# Patient Record
Sex: FEMALE | Race: WHITE
[De-identification: names, ages, dates, MRNs, and addresses within clinical notes are randomized per-mention and may not be internally consistent; named-entity substitution may affect disease eponyms.]

---

## 2019-03-29 NOTE — HP
Admitting History and Physical





- Primary Care Physician


PCP: Katie Rose





- Admission


Chief Complaint: Right breast atypia


History of Present Illness: 





63 year old postmenapausal female  with family H/O breast cancer and was found 

to have some right breast assymetry at 12:00 on screening mammogram 2019 

which persisted on x veiws and Us showed 5 mm are corresponding to mammogram 

finding. US core bx left breast 3/13/2019 showed Atypical duct hyperplasia at 12

:00. 


History Source: Patient


Limitations to Obtaining History: No Limitations





- Past Medical History


Cardiovascular: Yes: HTN





- Past Surgical History


Past Surgical History: Yes:  (x3)


Additional Past Surgical History: 





Mohs surgery basal cell 





- Smoking History


Smoking history: Never smoked


Have you smoked in the past 12 months: No





- Alcohol/Substance Use


Hx Alcohol Use: No





Home Medications





- Allergies


Allergies/Adverse Reactions: 


 Allergies











Allergy/AdvReac Type Severity Reaction Status Date / Time


 


No Known Allergies Allergy   Verified 19 15:24














- Home Medications


Home Medications: 


Ambulatory Orders





Metoprolol Succinate [Toprol Xl] 12.5 mg PO DAILY 19 











Family Disease History





- Family Disease History


Family Disease History: CA: Grandparent (pat GM breast  ca 60'd), Mother (CRC 72

)


Other Family History: pat cousin breast ca 45/50.  mat cousin breast and 

ovarian ca 55/63





Physical Examination


Breast(s): Yes: Other ( mod D sized cup breast Resolving bruising from bx right 

breast 12:00 no palpable masses in either breast no adenopathy)





Problem List





- Problems


(1) Atypical ductal hyperplasia of right breast


Code(s): N60.91 - UNSPECIFIED BENIGN MAMMARY DYSPLASIA OF RIGHT BREAST   





Assessment/Plan





Right breast wide excision with mammogram needle localization

## 2019-04-02 ENCOUNTER — HOSPITAL ENCOUNTER (OUTPATIENT)
Dept: HOSPITAL 74 - FASU | Age: 64
Discharge: HOME | End: 2019-04-02
Attending: SURGERY
Payer: COMMERCIAL

## 2019-04-02 VITALS — DIASTOLIC BLOOD PRESSURE: 78 MMHG | HEART RATE: 71 BPM | SYSTOLIC BLOOD PRESSURE: 146 MMHG

## 2019-04-02 VITALS — TEMPERATURE: 97.8 F

## 2019-04-02 VITALS — BODY MASS INDEX: 33.6 KG/M2

## 2019-04-02 DIAGNOSIS — N60.91: Primary | ICD-10-CM

## 2019-04-02 DIAGNOSIS — N64.89: ICD-10-CM

## 2019-04-02 DIAGNOSIS — Z80.3: ICD-10-CM

## 2019-04-02 PROCEDURE — 0HBT0ZZ EXCISION OF RIGHT BREAST, OPEN APPROACH: ICD-10-PCS | Performed by: SURGERY

## 2019-04-03 NOTE — OP
DATE OF OPERATION:  04/02/2019

 

PREOPERATIVE DIAGNOSIS:  Right breast atypical duct hyperplasia.

 

POSTOPERATIVE DIAGNOSIS:  Right breast atypical duct hyperplasia.

 

PROCEDURE:  Right breast partial mastectomy with mammographic needle localization.

 

ANESTHESIA:  Local with IV sedation.

 

SURGEON:  Marvin Pereira MD

 

COMPLICATIONS:  None.

 

Briefly, the patient is a 63-year-old, G3, P3, postmenopausal white female with a

strong family history with her paternal grandmother passed away from breast cancer in

her 60s, as well as 2 paternal cousins who had breast cancer at age 45 and age 50. 

Her maternal cousin had ovarian cancer at age 55 and breast cancer at age 63.  Her

mother passed away from colon cancer at age 72.  The patient underwent a mammography

in February 2019, showing some asymmetry in the right breast 12 o'clock region, and

ultrasound showed a corresponding 5-mm region which was biopsied under ultrasound

guidance, came back showing atypical duct hyperplasia.  Slides were reviewed and

confirmed the atypia, and wide excision was recommended.  She was brought in for the

procedure on April 2, 2019.

 

She first underwent a needle localization in radiology, was brought to the holding

area.  In the holding area, site verification was made, and informed consent was

obtained.  She was brought into the operating room, laid on the OR table in a supine

position.  Venodynes were placed on the lower extremities, and she received IV

sedation.  Next, 1 g of Ancef was given prior to the incision.  The right breast was

sterilely prepped and draped in usual fashion with the wire prepped in the field. 

Then, 1% lidocaine was given in a curvilinear fashion around the needle localization

site at the 12 o'clock region in the right breast.  Curvilinear incision was made,

and dissection was undertaken around the needle localization site.  The breast tissue

was completely removed from around the wire, and the specimen was removed with the

wire in the middle of the specimen.  The specimen was oriented with a long

lateral/short superior suture, and specimen radiographs showed removal of the clip in

question.  Hemostasis was achieved.  The breast parenchyma was then reapproximated

using 2-0 plain suture.  The skin was closed using interrupted 3-0 deep dermal Vicryl

suture and a running 4-0 subcuticular Biosyn suture.  Mastisol and Steri-Strips were

applied over the wound.  A compressive dressing placed over this.  The patient

tolerated the procedure well, without difficulty, was awake and alert at the end of

the procedure and brought back to Ambulatory Surgery postoperatively where she will

be discharged home the same day once discharge criteria are met.  She is to follow up

in the office in 1 week for a formal wound and pathology check.  Estimated blood loss

was minimal, and all sponge and needle counts were correct at the end of the case.

 

 

MARVIN PEREIRA M.D.

 

MARIA [id number]

DD: 04/02/2019 16:36

DT: 04/02/2019 19:41

Job #:  [id number]

## 2019-04-04 NOTE — PATH
Surgical Pathology Report



Patient Name:  CELESTE PINA

Accession #:  

Med. Rec. #:  [id number]                                                        

   /Age/Gender:  1955 (Age: 63) / F

Account:  [id number]                                                          

             [facility] 

Taken:  2019

Received:  2019

Reported:  2019

Physicians:  Carlos Pereira M.D.

  



Specimen(s) Received

 RIGHT BREAST WIDE EXCISION 





Clinical History

Right breast ADH on core biopsy







Final Diagnosis

BREAST, RIGHT, WIDE EXCISION:

BENIGN BREAST PARENCHYMA WITH FIBROCYSTIC AND COLUMNAR CELL CHANGES WITH

ASSOCIATED MICROCALCIFICATIONS IN A BACKGROUND OF FIBROELASTOTIC STROMA

SUGGESTIVE OF RADIAL SCAR.



Comment: Immunohistochemical stains performed and interpreted at [facility] for p63 and [facility] highlights myoepithelial cells. Prior

material is noted.





***Electronically Signed***

Deonna Waterman M.D.





Gross Description

Received in formalin, labeled "right breast wide excision," is a 4.3 x 3.7 x 2.8

cm. tan-yellow, irregular, portion of fibroadipose tissue with a needle

localization wire present. There is a short suture marking the superior aspect

and a long suture marking the lateral aspect, per the surgeon. There is no skin

or nipple present. The specimen is inked as follows: superior and lateral blue;

inferior green; medial yellow; anterior red; deep black. The specimen is

serially sectioned from lateral to medial. Sectioning reveals a 0.8 x 0.5 x 0.5

cm tan, indurated lesion. The lesion is 0.5 cm from the deep margin and 0.5 cm

from the inferior margin. Representative sections are submitted in 6 cassettes

as follows: 1-full face section of lesion with inferior and deep margins;

2-additional full face section of lesion with inferior and deep margins;

3-superior margin; 4-anterior margin; 5-medial margin; 6-lateral margin.



Time to formalin fixation: 5 minutes

Total formalin fixation time: Approximately 26 hours.

/4/3/2019



Located within [facility]/4/3/2019

## 2021-11-11 ENCOUNTER — NON-APPOINTMENT (OUTPATIENT)
Age: 66
End: 2021-11-11

## 2021-11-11 PROBLEM — Z00.00 ENCOUNTER FOR PREVENTIVE HEALTH EXAMINATION: Status: ACTIVE | Noted: 2021-11-11

## 2021-11-18 DIAGNOSIS — Z80.3 FAMILY HISTORY OF MALIGNANT NEOPLASM OF BREAST: ICD-10-CM

## 2021-11-18 DIAGNOSIS — Z80.0 FAMILY HISTORY OF MALIGNANT NEOPLASM OF DIGESTIVE ORGANS: ICD-10-CM

## 2021-11-18 DIAGNOSIS — Z87.891 PERSONAL HISTORY OF NICOTINE DEPENDENCE: ICD-10-CM

## 2021-11-18 DIAGNOSIS — Z80.41 FAMILY HISTORY OF MALIGNANT NEOPLASM OF OVARY: ICD-10-CM

## 2021-11-18 DIAGNOSIS — Z86.79 PERSONAL HISTORY OF OTHER DISEASES OF THE CIRCULATORY SYSTEM: ICD-10-CM

## 2021-11-18 DIAGNOSIS — Z85.828 PERSONAL HISTORY OF OTHER MALIGNANT NEOPLASM OF SKIN: ICD-10-CM

## 2021-11-19 ENCOUNTER — APPOINTMENT (OUTPATIENT)
Dept: BREAST CENTER | Facility: CLINIC | Age: 66
End: 2021-11-19
Payer: MEDICARE

## 2021-11-19 VITALS
HEIGHT: 67 IN | DIASTOLIC BLOOD PRESSURE: 79 MMHG | BODY MASS INDEX: 29.82 KG/M2 | OXYGEN SATURATION: 99 % | WEIGHT: 190 LBS | HEART RATE: 73 BPM | SYSTOLIC BLOOD PRESSURE: 139 MMHG

## 2021-11-19 DIAGNOSIS — Z80.8 FAMILY HISTORY OF MALIGNANT NEOPLASM OF OTHER ORGANS OR SYSTEMS: ICD-10-CM

## 2021-11-19 PROCEDURE — 99205 OFFICE O/P NEW HI 60 MIN: CPT

## 2021-11-19 RX ORDER — METOPROLOL SUCCINATE 200 MG/1
TABLET, EXTENDED RELEASE ORAL
Refills: 0 | Status: ACTIVE | COMMUNITY

## 2021-11-19 NOTE — REASON FOR VISIT
[Follow-Up: _____] : a [unfilled] follow-up visit [FreeTextEntry1] : The patient comes in with a strong family history of breast cancer and ovarian cancer and a personal history of undergoing a right breast 12:00 ultrasound-guided core biopsy showing atypical duct hyperplasia and then a wide excision in March 2019 just showing a radial sclerosing lesion with no further atypia.  She has a Caren model risk of 15.7%.  She was then found to have an abnormal mammography and ultrasound  in October 2021 with a suspicious approximately 2 cm left breast lower inner quadrant density which was biopsied on November 5, 2021 showing intermediate grade invasive duct cancer which is ER/WV strongly positive and HER-2/merrill negative with a Ki-67 between 40 and 50%.  She comes in now for an interval surgical evaluation with this newly diagnosed left breast cancer.

## 2021-11-19 NOTE — ASSESSMENT
[FreeTextEntry1] : The patient is a 66-year-old G3, P3 postmenopausal white female.  She underwent menarche at age 11 and had her first child at age 24.  She underwent menopause at age 54 and never took any hormone replacement therapy.  She has a family history with her mother who had colon cancer and passed away at age 72.  Her paternal grandmother passed away from breast cancer in her 60s.  A maternal cousin had ovarian cancer at age 55 and then breast cancer at age 63 and tested BRCA negative.  2 paternal cousins had breast cancer at ages 45 and age 50.  Both tested BRCA negative.  Her sister had a phyllodes tumor excised.  I take care of both her sisters, Racheal Stevenson and Mrs. Dunn.  She was found to have some architectural distortion in the 12:00 region of the right breast on mammography and ultrasound showed a 5 mm corresponding density.  Ultrasound-guided right breast 12:00 core biopsy at [facility] showed some atypical duct hyperplasia and she underwent a wide excision on March 27, 2019 showing a radial sclerosing lesion but no further atypia.  She has a Caren model lifetime risk of 15.7% and 5-year risk of 4.1%.  She underwent routine mammography and ultrasound at [facility] on October 26, 2021 showing postsurgical changes in the right breast  but there was a new ill-defined spiculated mass in the left breast lower inner 7:00 region measuring about 2 cm on mammography and 1.4 x 1.7 x 1.2 cm on ultrasound in the 7:00 region 6 cm from the nipple.  There was also an enlarged left lower axillary lymph node measuring 4.4 x 1.3 cm.  She underwent a left breast ultrasound-guided core biopsy on November 5, 2021 with the placement of a "cylindrical" shaped clip and pathology showing an intermediate grade 1.2 cm invasive duct cancer which is ER/MA strongly positive at 95 to 100% and HER-2/merrill negative by IHC with a Ki-67 between 40 and 50%.  On exam today, I cannot feel any suspicious densities in either breast even though close attention to the left breast 7:00 region. I reviewed her outside ultrasound from [facility] as well as her pathology and this is an intermediate grade invasive duct cancer which appears localized on ultrasound.  I have asked the patient to get me the mammography so I can review that as well.  There is an enlarged lymph node underneath the left axilla but this really appears benign and just enlarged.  I spoke to the patient about the need for an MRI to determine if there is any multifocal or contralateral disease.  If this is localized she be a good candidate for partial mastectomy and sentinel lymph node biopsy.  She understands the need for radiation with any breast conserving surgery.  I spoke to her about possible entrance into the TARGIT B trial which would randomize her into either external beam or intraoperative and external beam radiation.  She is also a candidate for genetic testing and I will refer her to our genetic counselor.  She understands that chemotherapy will be dependent on the final pathology and possibly Oncotype or MammaPrint score.  She understands the equivalent survival rates with lumpectomy and radiation versus mastectomy.  I spoke to her about the benefits of endocrine therapy given her hormone positive status as well.  Risk/benefits of proposed surgeries were also explained to the patient.  I will go ahead and follow-up on the MRI results and then we can go from there.

## 2021-11-19 NOTE — HISTORY OF PRESENT ILLNESS
[FreeTextEntry1] : The patient is a 66-year-old G3, P3 postmenopausal white female.  She underwent menarche at age 11 and had her first child at age 24.  She underwent menopause at age 54 and never took any hormone replacement therapy.  She has a family history with her mother who had colon cancer and passed away at age 72.  Her paternal grandmother passed away from breast cancer in her 60s.  A maternal cousin had ovarian cancer at age 55 and then breast cancer at age 63 and tested BRCA negative.  2 paternal cousins had breast cancer at ages 45 and age 50.  Both tested BRCA negative.  Her sister had a phyllodes tumor excised.  I take care of both her sisters, Racheal Stevenson and Mrs. Dunn.  She comes in now and was found to have some architectural distortion in the 12:00 region of the right breast on mammography and ultrasound showed a 5 mm corresponding density.  Ultrasound-guided right breast 12:00 core biopsy at [facility] showed some atypical duct hyperplasia and she underwent a wide excision on March 27, 2019 showing a radial sclerosing lesion but no further atypia.  She has a Caren model lifetime risk of 15.7% and 5-year risk of 4.1%.  She comes in for routine yearly follow-up and gets yearly mammography and ultrasound. She underwent routine mammography and ultrasound at [facility] on October 26, 2021 showing postsurgical changes in the right breast  but there was a new ill-defined spiculated mass in the left breast lower inner 7:00 region measuring about 2 cm on mammography and 1.4 x 1.7 x 1.2 cm on ultrasound in the 7:00 region 6 cm from the nipple.  There was also an enlarged left lower axillary lymph node measuring 4.4 x 1.3 cm.  She underwent a left breast ultrasound-guided core biopsy on November 5, 2021 with the placement of a "cylindrical" shaped clip and pathology showing an intermediate grade 1.2 cm invasive duct cancer which is ER/SD strongly positive at 95 to 100% and HER-2/merrill negative by IHC with a Ki-67 between 40 and 50%.  She comes in now for an interval surgical consultation

## 2021-11-19 NOTE — PHYSICAL EXAM
[Normocephalic] : normocephalic [Atraumatic] : atraumatic [EOMI] : extra ocular movement intact [Supple] : supple [No Supraclavicular Adenopathy] : no supraclavicular adenopathy [No Cervical Adenopathy] : no cervical adenopathy [Examined in the supine and seated position] : examined in the supine and seated position [No dominant masses] : no dominant masses in right breast  [No Nipple Retraction] : no left nipple retraction [No Nipple Discharge] : no left nipple discharge [Breast Mass Right Breast ___cm] : no masses [Breast Mass Left Breast ___cm] : no masses [No Axillary Lymphadenopathy] : no left axillary lymphadenopathy [No Edema] : no edema [No Rashes] : no rashes [No Ulceration] : no ulceration [Breast Nipple Inversion] : nipples not inverted [Breast Nipple Retraction] : nipples not retracted [Breast Nipple Flattening] : nipples not flattened [Breast Nipple Fissures] : nipples not fissured [Breast Abnormal Lactation (Galactorrhea)] : no galactorrhea [Breast Abnormal Secretion Bloody Fluid] : no bloody discharge [Breast Abnormal Secretion Serous Fluid] : no serous discharge [Breast Abnormal Secretion Opalescent Fluid] : no milky discharge [de-identified] : On exam, the patient has moderately ptotic C-cup breasts.  On palpation, I cannot feel any suspicious densities in either breast even with close attention to the left breast 7:00 region.  She has no axillary, supraclavicular, or cervical adenopathy.

## 2021-11-30 ENCOUNTER — APPOINTMENT (OUTPATIENT)
Dept: RADIATION ONCOLOGY | Facility: CLINIC | Age: 66
End: 2021-11-30
Payer: MEDICARE

## 2021-11-30 VITALS
WEIGHT: 210 LBS | RESPIRATION RATE: 12 BRPM | OXYGEN SATURATION: 99 % | DIASTOLIC BLOOD PRESSURE: 88 MMHG | TEMPERATURE: 98 F | HEIGHT: 67 IN | SYSTOLIC BLOOD PRESSURE: 152 MMHG | BODY MASS INDEX: 32.96 KG/M2 | HEART RATE: 78 BPM

## 2021-11-30 DIAGNOSIS — Z78.9 OTHER SPECIFIED HEALTH STATUS: ICD-10-CM

## 2021-11-30 PROCEDURE — 99204 OFFICE O/P NEW MOD 45 MIN: CPT | Mod: 25

## 2021-11-30 NOTE — PHYSICAL EXAM
[Supraclavicular Lymph Nodes Enlarged Bilaterally] : supraclavicular [Axillary Lymph Nodes Enlarged Bilaterally] : axillary [Normal] : no focal deficits [de-identified] : Well-healed scar in the upper outer quadrant of the right breast; breasts are symmetric; no palpable masses [de-identified] : I was not able to palpate the reported left axillary node

## 2021-11-30 NOTE — HISTORY OF PRESENT ILLNESS
[FreeTextEntry1] : Ms. Gayle is a 66 year old female referred here by Dr. Teixeira. \par \par On 3/13/19 she had a mammogram at [facility] and was found to have some architectural distortion in the 12:00 region of the right breast on mammography and ultrasound showed a 5 mm corresponding density.\par  \par On 3/13/19 she had an ultrasound guided right breast 12:00 core biopsy at [facility] which revealed some atypical duct hyperplasia. \par \par On 3/27/19 she underwent a wide excision lumpectomy with Dr. Teixeira at [facility] which revealed a radial sclerosing lesion but no further atypia. \par \par She continued her routine yearly follow-ups and yearly mammography and ultrasound. \par \par On 10/26/21 she underwent routine mammography and ultrasound at [facility], showing postsurgical changes in the right breast but there was a new ill-defined spiculated mass in the left breast lower inner 7:00 region measuring about 2 cm on mammography and 1.4 x 1.7 x 1.2 cm on ultrasound in the 7:00 region 6 cm from the nipple. There was also an enlarged left lower axillary lymph node measuring 4.4 x 1.3 cm. \par \par On 11/5/21 She underwent a left breast ultrasound-guided core biopsy at the 7:00-7:30, 6cm from the nipple, with the placement of a "cylindrical" shaped clip and pathology revealed an intermediate grade 1.2 cm invasive duct cancer which is ER/VT strongly positive at 95 to 100% and HER-2/merrill negative by IHC with a Ki-67 between 40 and 50%. \par \par On 11/29/21 she had a bilateral breast MRI at [facility] revealing biopsy proven malignancy in the lower slightly inner left breast measuring up to 2.1cm, with associated suspicious linear non mass enhancement extending anterior and inferior to the index mass measuring approximately 1.5cm. Linear nonmass enhancement in the central left breast. Suspicious nonmass enhancement in the lower inner and central inner right breast measuring up to 4.7 cm in AP dimension. \par \par She denies any pain. She reported having insomnia intermittently, along with anxiety related to her plan of care. Health and wellness information was given to the patient. She has a biopsy scheduled to 12/9/21 at [place] of a new finding in the right breast  and the left breast lymph node.

## 2021-11-30 NOTE — VITALS
[Maximal Pain Intensity: 0/10] : 0/10 [Least Pain Intensity: 0/10] : 0/10 [90: Able to carry normal activity; minor signs or symptoms of disease.] : 90: Able to carry normal activity; minor signs or symptoms of disease.  [90: Minor restrictions in physically strenous activity.] : 90: Minor restrictions in physically strenuous activity. [ECOG Performance Status: 1 - Restricted in physically strenuous activity but ambulatory and able to carry out work of a light or sedentary nature] : Performance Status: 1 - Restricted in physically strenuous activity but ambulatory and able to carry out work of a light or sedentary nature, e.g., light house work, office work [Date: ____________] : Patient's last distress assessment performed on [unfilled]. [7 - Distress Level] : Distress Level: 7 [FreeTextEntry7] : Health and wellness referral made

## 2021-11-30 NOTE — REVIEW OF SYSTEMS
[Insomnia] : insomnia [Anxiety] : anxiety [Negative] : Allergic/Immunologic [de-identified] : The patient is quite anxious due to this new diagnosis. She is having trouble sleeping and concentrating.

## 2021-12-08 ENCOUNTER — NON-APPOINTMENT (OUTPATIENT)
Age: 66
End: 2021-12-08

## 2021-12-09 ENCOUNTER — RESULT REVIEW (OUTPATIENT)
Age: 66
End: 2021-12-09

## 2021-12-14 ENCOUNTER — NON-APPOINTMENT (OUTPATIENT)
Age: 66
End: 2021-12-14

## 2021-12-15 ENCOUNTER — NON-APPOINTMENT (OUTPATIENT)
Age: 66
End: 2021-12-15

## 2021-12-20 ENCOUNTER — APPOINTMENT (OUTPATIENT)
Dept: HEMATOLOGY ONCOLOGY | Facility: CLINIC | Age: 66
End: 2021-12-20

## 2021-12-21 NOTE — DISCUSSION/SUMMARY
[FreeTextEntry1] : REASON FOR CONSULT\par Amanda Gayle is a 66-year-old female referred by Dr. Weston Teixeira for cancer genetic counseling and risk assessment due to a new diagnosis of bilateral breast cancer. Ms. Gayle was seen on 2021 at which time medical and family history was ascertained and a pedigree constructed. \par \par RELEVANT MEDICAL HISTORY\par Ms. Gayle was diagnosed with bilateral breast cancer in  at the age of 66. Pathology report of the right breast revealed invasive lobular carcinoma (ER+/ND+/HER2-) with LCIS and pathology of the left breast revealed invasive ductal cancer (ER+/ND+/HER2-). Surgery is not yet scheduled as results from genetic testing will help determine surgical approach. \par \par OTHER MEDICAL AND SURGICAL HISTORY:\par •	Medical History: no major medical history reported.\par •	Surgical History: , previous right lumpectomy for radial sclerosing lesion, knee surgery\par \par OB/GYN HISTORY:\par Obstetrical History: \par Age at Menarche: 14\par Menopausal Status: Post-menopausal with LMP at age 54 \par Age at First Live Birth: 24\par Oral Contraceptive Use: Yes, 1 year\par Hormone Replacement Therapy: No\par \par CANCER SCREENING HISTORY:  \par Breast: \par •	Mammography: 10/26/21- rec left breast biopsy\par •	Sonography: 10/26/21- rec left breast biopsy\par •	MRI: 21- rec right and additional left biopsy\par GYN:\par •	Pelvic Examination: Does not have regular GYN\par Colon:\par •	Colonoscopy: 3 years ago- no polyps reported, return in 7 years \par Skin:  \par •	FBSE: Yes\par •	Lesions biopsied/removed: Yes- face (basal cell)\par \par SOCIAL HISTORY:\par •	Tobacco-product use: Yes, former- quit 30+ years ago\par •	Environmental exposures: No\par \par FAMILY HISTORY:\par Maternal ancestry was reported as Danish and paternal ancestry was reported as Syriac/[place]. Ashkenazi Mu-ism ancestry was denied. A detailed family history of cancer was ascertained, see below and scanned chart for pedigree. \par \par 	Of note, Ms. Gayle reported some of her maternal and paternal female cousins with a history of cancer may have had genetic testing however further details are unknown.\par \par According to Ms. Gayle no one else in the family has had germline testing for cancer susceptibility. Consanguinity was denied. \par 	\par RISK ASSESSMENT:\par Ms. Gayle’s personal and family history is suggestive of a hereditary cancer syndrome given her new diagnosis of bilateral breast cancer, her daughter’s history of melanoma, a maternal cousin with breast and ovarian cancer in her 60s, a maternal cousin with breast cancer in her 50s, and two paternal cousins with breast cancer in their 40s or 50s. The patient meets National Comprehensive Cancer Network (NCCN) criteria for genetic testing. Given that she plans to make surgical decisions based on results from genetic testing, we recommended the [facility] Breast STAT Panel testing for genes associated with breast cancer (typically results within 5-12 days). This test analyzes 9 genes: GUSTAVO, BRCA1, BRCA2, CDH1, CHEK2, PALB2, PTEN, STK11, and TP53.\par \par The risks, benefits and limitations of genetic testing were discussed with Ms. Gayle. In addition, we discussed the purpose of genetic testing and possible test results (positive, negative, inconclusive) along with associated medical management options and psychosocial implications. Insurance coverage and potential out of pocket costs were also discussed. \par \par It was explained that risk assessment is based upon medical and family history as provided and may change in the future should new information be obtained. \par \par Following our discussion, Ms. Gayle consented to the above-mentioned genetic testing panel. Blood was drawn in our laboratory and sent to [facility] today.\par \par PLAN:\par \par 1.	Blood drawn today will be sent to [facility] for analysis. \par 2.	We will contact Ms. Gayle to schedule a follow-up appointment once the results are available. Results from the STAT panel generally return in 5-12 days. \par \par For any additional questions please call Cancer Genetics at [phone]. \par \par \par Blanca Hernandes MS, [facility]\par Genetic Counselor, Cancer Genetics\par \par \par CC: \par Weston Teixeira MD\par \par \par \par

## 2021-12-22 ENCOUNTER — APPOINTMENT (OUTPATIENT)
Dept: HEMATOLOGY ONCOLOGY | Facility: CLINIC | Age: 66
End: 2021-12-22
Payer: MEDICARE

## 2021-12-22 ENCOUNTER — APPOINTMENT (OUTPATIENT)
Dept: BREAST CENTER | Facility: CLINIC | Age: 66
End: 2021-12-22
Payer: MEDICARE

## 2021-12-22 VITALS
HEART RATE: 65 BPM | RESPIRATION RATE: 18 BRPM | WEIGHT: 212 LBS | SYSTOLIC BLOOD PRESSURE: 165 MMHG | BODY MASS INDEX: 33.27 KG/M2 | DIASTOLIC BLOOD PRESSURE: 90 MMHG | HEIGHT: 67 IN | OXYGEN SATURATION: 100 % | TEMPERATURE: 97.8 F

## 2021-12-22 PROCEDURE — 99205 OFFICE O/P NEW HI 60 MIN: CPT

## 2021-12-22 PROCEDURE — 99212 OFFICE O/P EST SF 10 MIN: CPT

## 2021-12-22 NOTE — REASON FOR VISIT
[Follow-Up: _____] : a [unfilled] follow-up visit [FreeTextEntry1] : The patient comes in with a strong family history of breast cancer and ovarian cancer and a personal history of undergoing a right breast 12:00 ultrasound-guided core biopsy showing atypical duct hyperplasia and then a wide excision in March 2019 just showing a radial sclerosing lesion with no further atypia.  She has a Caren model risk of 15.7%.  She was then found to have an abnormal mammography and ultrasound  in October 2021 with a suspicious approximately 2 cm left breast lower inner quadrant density which was biopsied on November 5, 2021 showing intermediate grade invasive duct cancer which is ER/GA strongly positive and HER-2/merrill negative with a Ki-67 between 40 and 50%.  She comes in now for an interval surgical evaluation with this newly diagnosed left breast cancer.

## 2021-12-22 NOTE — PHYSICAL EXAM
[Normocephalic] : normocephalic [Atraumatic] : atraumatic [EOMI] : extra ocular movement intact [Supple] : supple [No Supraclavicular Adenopathy] : no supraclavicular adenopathy [No Cervical Adenopathy] : no cervical adenopathy [Examined in the supine and seated position] : examined in the supine and seated position [No dominant masses] : no dominant masses in right breast  [No Nipple Retraction] : no left nipple retraction [No Nipple Discharge] : no left nipple discharge [Breast Mass Right Breast ___cm] : no masses [Breast Mass Left Breast ___cm] : no masses [Breast Nipple Inversion] : nipples not inverted [Breast Nipple Retraction] : nipples not retracted [Breast Nipple Flattening] : nipples not flattened [Breast Nipple Fissures] : nipples not fissured [Breast Abnormal Lactation (Galactorrhea)] : no galactorrhea [Breast Abnormal Secretion Bloody Fluid] : no bloody discharge [Breast Abnormal Secretion Serous Fluid] : no serous discharge [Breast Abnormal Secretion Opalescent Fluid] : no milky discharge [No Axillary Lymphadenopathy] : no left axillary lymphadenopathy [No Edema] : no edema [No Rashes] : no rashes [No Ulceration] : no ulceration [de-identified] : On exam, the patient has moderately ptotic C-cup breasts.  On palpation, I cannot feel any suspicious densities in either breast even with close attention to the left breast 7:00 region.  She has no axillary, supraclavicular, or cervical adenopathy.

## 2021-12-22 NOTE — ASSESSMENT
[FreeTextEntry1] : The patient is a 66-year-old G3, P3 postmenopausal white female.  She underwent menarche at age 11 and had her first child at age 24.  She underwent menopause at age 54 and never took any hormone replacement therapy.  She has a family history with her mother who had colon cancer and passed away at age 72.  Her paternal grandmother passed away from breast cancer in her 60s.  A maternal cousin had ovarian cancer at age 55 and then breast cancer at age 63 and tested BRCA negative.  2 paternal cousins had breast cancer at ages 45 and age 50.  Both tested BRCA negative.  Her sister had a phyllodes tumor excised.  I take care of both her sisters, Racheal Stevenson and Mrs. Dunn.  She was found to have some architectural distortion in the 12:00 region of the right breast on mammography and ultrasound showed a 5 mm corresponding density.  Ultrasound-guided right breast 12:00 core biopsy at [facility] showed some atypical duct hyperplasia and she underwent a wide excision on March 27, 2019 showing a radial sclerosing lesion but no further atypia.  She has a Caren model lifetime risk of 15.7% and 5-year risk of 4.1%.  She underwent routine mammography and ultrasound at [facility] on October 26, 2021 showing postsurgical changes in the right breast  but there was a new ill-defined spiculated mass in the left breast lower inner 7:00 region measuring about 2 cm on mammography and 1.4 x 1.7 x 1.2 cm on ultrasound in the 7:00 region 6 cm from the nipple.  There was also an enlarged left lower axillary lymph node measuring 4.4 x 1.3 cm.  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  On exam today, I cannot feel any suspicious densities in either breast even though close attention to the left breast 7:00 region. I reviewed her outside ultrasound from [facility] as well as her pathology and this is an intermediate grade invasive duct cancer which appears localized on ultrasound.  I have asked the patient to get me the mammography so I can review that as well.  There is an enlarged lymph node underneath the left axilla but this really appears benign and just enlarged.  MRI performed on November 29, 2021 showed another suspicious linear area of nonmasslike enhancement in the central left breast as well as an area of non-mass-like enhancement in the right breast lower inner and central region measuring about 4.7 cm.  She underwent bilateral MRI guided core biopsies performed on December 9, 2021 in the left central region showed a sclerosed papillary lesion.  The right breast lower inner quadrant showed an intermediate grade invasive lobular cancer which was ER positive at 80%, WI positive at 40%, HER-2/merrill negative by IHC with a Ki-67 of 25%.  She was seen by Dr. Sangeeta Jones.  Genetic testing is pending.  She understands the equivalent survival rates with lumpectomy and radiation versus mastectomy and  I spoke to her about the benefits of endocrine therapy given her hormone positive status as well.  Risk/benefits of proposed surgeries were also explained to the patient.  She is leaning towards moving forward with bilateral partial mastectomies for her bilateral breast cancer.  She has an appointment to see Dr. Patricio tomorrow to discuss mastopexies at the same sitting.  She understands the need for sentinel lymph node biopsies bilaterally and this was discussed with the patient.  She understands the need for mag seed and Skylar  localizations to be placed preoperatively.  We are tentatively scheduled for surgery on January 21.  All her questions were answered and she has a full understanding.

## 2021-12-22 NOTE — REVIEW OF SYSTEMS
[Joint Pain] : joint pain [Insomnia] : insomnia [Negative] : Endocrine [Shortness Of Breath] : no shortness of breath [Cough] : no cough [Anxiety] : anxiety [FreeTextEntry6] : walks usually but gained weight recently  [de-identified] : taking melatonin , not taking xanax as prescrbed

## 2021-12-22 NOTE — HISTORY OF PRESENT ILLNESS
[de-identified] : This is a 66-year-old postmenopausal woman presenting with a new diagnosis of breast cancer\par Patient goes for regular routine screening mammograms and was initally  found to have architectural distortion at the right breast 12 o'clock position, ultrasound revealing for a 5 mm density she underwent a right breast  biopsy at [facility]  on 3/2019.  which showed atypical ductal hyperplasia and then underwent wide excision in March 2019 showing a radial sclerosing lesion but no further atypia or malignancy. Dr juarez. \par ? chemo prevention ? \par  Patient follows with Dr. Jessica Hi due to the high risk status and goes for regular mammo and MRI \par \par Routine screening mammogram on October 26, 2021 showed postsurgical changes right breast but there was a new ill-defined spiculated mass left breast 7 o'clock position measuring 2 cm there is also an enlarged left axillary lymph node measuring 4.4 x 1.3 cm ( this appeared benign  was not seen on  MRI) \par \par patient underwent a left breast ultrasound-guided core biopsy on November 5, 2021  which revealed an intermediate grade invasive  ductal carcinoma ER/MA strongly +95 to 100% HER-2/merrill negative Ki-67 between 40 and 50%.\par \par MRI performed on November 29, 2021 revealing for a biopsy-proven malignancy in her left breast up to 2.1 cm with suspicious linear nonmasslike enhancement extending to the anterior inferior index mass total suspicious area of enhancement is 3.3 cm also linear non-mass enhancement left breast at the central portion of the breast MRI guided biopsy was recommended as well suspicious nonletter masslike enhancement in the inner right breast measuring 4.7 cm MRI guided biopsy was recommended.\par \par MRI guided biopsy performed on December 9, 2021 revealing for the following left breast non-mass-like enhancement showed usual ductal hyperplasia\par \par Right breast non-mass-like enhancement lower inner quadrant revealed grade 2 invasive lobular carcinoma grade 2 out of 3 invasive carcinoma was present in multiple cores up to 12 mm LCIS was also present and focal atypical ductal hyperplasia\par Tumor was ER +80% MA +40% HER-2/merrill negative Ki-67 25%.\par \par Await genetic testing , monday.  9 genes\par Daughter is negative for BRCA \par \par Patient has extensive family history of cancer including a mother who had colon cancer a paternal grandmother who had breast cancer maternal cousin who had ovarian cancer and then breast cancer.  2 paternal cousins who had breast cancer her sister had a phyllodes \par \par Meeting with dr robles tomorrow plastics \par \par \par \par \par \par \par \par \par \par \par  [de-identified] : bone density in 2019 (putnum) \par \par feeling well just anxious \par will be meeting with dr juarez later today

## 2021-12-22 NOTE — HISTORY OF PRESENT ILLNESS
[FreeTextEntry1] : The patient is a 66-year-old G3, P3 postmenopausal white female.  She underwent menarche at age 11 and had her first child at age 24.  She underwent menopause at age 54 and never took any hormone replacement therapy.  She has a family history with her mother who had colon cancer and passed away at age 72.  Her paternal grandmother passed away from breast cancer in her 60s.  A maternal cousin had ovarian cancer at age 55 and then breast cancer at age 63 and tested BRCA negative.  2 paternal cousins had breast cancer at ages 45 and age 50.  Both tested BRCA negative.  Her sister had a phyllodes tumor excised.  I take care of both her sisters, Racheal Stevenson and Mrs. Dunn.  She comes in now and was found to have some architectural distortion in the 12:00 region of the right breast on mammography and ultrasound showed a 5 mm corresponding density.  Ultrasound-guided right breast 12:00 core biopsy at [facility] showed some atypical duct hyperplasia and she underwent a wide excision on March 27, 2019 showing a radial sclerosing lesion but no further atypia.  She has a Caren model lifetime risk of 15.7% and 5-year risk of 4.1%. She underwent routine mammography and ultrasound at [facility] on October 26, 2021 showing postsurgical changes in the right breast  but there was a new ill-defined spiculated mass in the left breast lower inner 7:00 region measuring about 2 cm on mammography and 1.4 x 1.7 x 1.2 cm on ultrasound in the 7:00 region 6 cm from the nipple.  There was also an enlarged left lower axillary lymph node measuring 4.4 x 1.3 cm.  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  MRI performed on November 29, 2021 showed another suspicious linear area of nonmasslike enhancement in the central left breast as well as an area of non-mass-like enhancement in the right breast lower inner and central region measuring about 4.7 cm.  She underwent bilateral MRI guided core biopsies performed on December 9, 2021 in the left central region showed a sclerosed papillary lesion.  The right breast lower inner quadrant showed an intermediate grade invasive lobular cancer which was ER positive at 80%, DE positive at 40%, HER-2/merrill negative by IHC with a Ki-67 of 25%.  Genetic testing is pending.  She was seen by Dr. Sangeeta Jones and she comes in now for a surgical discussion preoperatively.

## 2021-12-22 NOTE — PHYSICAL EXAM
[Obese] : obese [Normal] : affect appropriate [de-identified] : left breast is dense in upper  half , right breast well healed lumpecotmy scar , no adenopathy or masses palp bilaterally

## 2021-12-22 NOTE — ASSESSMENT
[FreeTextEntry1] : This is a 66-year-old postmenopausal woman presenting with bilateral breast cancer.\par Routine screening mammogram revealing for new abnormality on the left biopsy revealing for invasive ductal carcinoma ER/FL strongly positive HER-2/merrill negative.  Initially there was an enlarged lymph node but this was thought to be negative due to appearance and the lack of visualization on MRI.  Then on MRI in November 2021 patient was found to have a contralateral breast mass on the right measuring 4.7 cm biopsy was revealing for invasive lobular carcinoma grade 2 ER/FL positive HER-2/merrill negative.  She is now planned for bilateral lumpectomies.\par \par 1) bilateral breast cancer \par -Reviewed imaging and pathology at length\par -Patient appears to have a localized bilateral breast cancer with ductal on the left and lobular on the right both are ER/FL positive and HER-2/merrill negative.\par -Explained that the treatment of choice for breast cancer is surgery\par -Options for surgery include lumpectomy followed by radiation versus mastectomies\par -Genetic testing is currently pending if positive for a deleterious gene mutation the patient is leaning towards bilateral mastectomy if not her plan is to go forward with bilateral lumpectomies and sentinel lymph node biopsy.  She has an appointment later today to discuss with Dr. Jessica Hi.\par -Patient also has an appointment with the reconstructive surgeon as well.\par -Explained that following surgery we will review the pathology of both the breast tumor as well as the lymph nodes and based on the clinical risk stratification will be able to help determine her risk of distant recurrence and her need for systemic therapy.\par -She will definitely benefit from taking an antiestrogen.  Discussed the mechanism, efficacy and side effects of both tamoxifen and aromatase inhibitors.  Most likely we will go with an aromatase inhibitor.\par Explained that this is usually given for 5 to 10 years after radiation\par -In terms of whether or not she will need chemotherapy we will of course incorporate clinical risk stratification based on pathology as well as molecular classification.  As long as there is 3 or less lymph nodes detected bilaterally according to the Rx ponder trial given that she is postmenopausal we can use the Oncotype DX score to determine the benefit of chemotherapy in this population.  If the Oncotype score is over 25 she will benefit from chemo if below 25 there is no significant benefit to chemotherapy.\par -Given that the breast cancer appears to be early stage and ER/FL positive HER-2/merrill negative I see no indication for neoadjuvant therapy.\par -Furthermore I see no indication for systemic imaging at this point but will need to reassess once we get the final pathology back.\par -labs sent today including markers \par \par 2) vit d \par check vit d \par \par 3) osteopenia \par check dexa \par \par 4) obesity \par weight loss encouarged \par \par 5) genetics \par await testing results \par \par 6) anxiety \par xanax as needed \par \par dw patient at length time spent over 1 hour \par follow up after bilateral lumpectomy \par \par \par

## 2021-12-23 ENCOUNTER — APPOINTMENT (OUTPATIENT)
Dept: HEMATOLOGY ONCOLOGY | Facility: CLINIC | Age: 66
End: 2021-12-23
Payer: MEDICARE

## 2021-12-26 LAB
25(OH)D3 SERPL-MCNC: 32 NG/ML
ALBUMIN SERPL ELPH-MCNC: 4.7 G/DL
ALP BLD-CCNC: 85 U/L
ALT SERPL-CCNC: 20 U/L
ANION GAP SERPL CALC-SCNC: 16 MMOL/L
AST SERPL-CCNC: 20 U/L
BILIRUB SERPL-MCNC: 0.2 MG/DL
BUN SERPL-MCNC: 15 MG/DL
CALCIUM SERPL-MCNC: 9.9 MG/DL
CANCER AG15-3 SERPL-ACNC: 29.1 U/ML
CEA SERPL-MCNC: 1.5 NG/ML
CHLORIDE SERPL-SCNC: 101 MMOL/L
CO2 SERPL-SCNC: 25 MMOL/L
CREAT SERPL-MCNC: 0.76 MG/DL
FOLATE SERPL-MCNC: 7.7 NG/ML
GLUCOSE SERPL-MCNC: 47 MG/DL
IRON SATN MFR SERPL: 32 %
IRON SERPL-MCNC: 110 UG/DL
POTASSIUM SERPL-SCNC: 4.5 MMOL/L
PROT SERPL-MCNC: 7.4 G/DL
SODIUM SERPL-SCNC: 141 MMOL/L
TIBC SERPL-MCNC: 340 UG/DL
TSH SERPL-ACNC: 1.71 UIU/ML
UIBC SERPL-MCNC: 230 UG/DL
VIT B12 SERPL-MCNC: 477 PG/ML

## 2021-12-29 ENCOUNTER — NON-APPOINTMENT (OUTPATIENT)
Age: 66
End: 2021-12-29

## 2022-01-18 ENCOUNTER — NON-APPOINTMENT (OUTPATIENT)
Age: 67
End: 2022-01-18

## 2022-01-21 ENCOUNTER — APPOINTMENT (OUTPATIENT)
Dept: BREAST CENTER | Facility: HOSPITAL | Age: 67
End: 2022-01-21

## 2022-01-25 NOTE — DISCUSSION/SUMMARY
[FreeTextEntry1] : REASON FOR CONSULT\par Amanda Gayle is a 66-year-old female who was contacted on January 18, 2022 for a discussion regarding her negative genetic testing results related to hereditary cancer predisposition. This session was conducted via telephone. \par \par Ms. Gayle was originally seen by the Cancer Genetics Service on December 20, 2021 for hereditary cancer predisposition risk assessment due to a new diagnosis of bilateral breast cancer. At that time, Ms. Gayle decided to pursue genetic testing using [facility]’s breast STAT panel.\par \par INTERVAL HISTORY\par Upon completion of the STAT panel, Ms. Gayle was re-contacted on December 29, 2021 regarding her results and consented to pursue additional genetic testing for genes associated with melanoma, gynecological, and colon cancer due to her family history.\par \par TEST RESULTS: NEGATIVE\par NO pathogenic (disease-causing) variants or variants of uncertain significance were detected in any of the following genes [36]:  APC, GUSTAVO, AXIN2, BAP1, BARD1, BMPR1A, BRCA1, BRCA2, BRIP1, CDH1, CDK4, CDKN2A (p14ARF), CDKN2A ([id number]), CHEK2, EPCAM, GREM1, MITF, MLH1, MSH2, MSH3, MSH6, MUTYH, NBN, NF1, NTHL1, PALB2, PMS2, POLD1, POLE, POT1, PTEN, RAD51C, RAD51D, RB1, SMAD4, STK11, and  TP53.\par \par RESULTS INTERPRETATION AND ASSESSMENT:\par Given Ms. Gayle’s personal and current reported family history of cancer, and her negative genetic test results, the following screening guidelines and risk-reducing recommendations were discussed:\par \par BREAST: \par •	It was discussed Ms. Gayle’s surgical plan should not be impacted by these negative results.\par •	Long-term management and surveillance should be based on Ms. Gayle’s on- or post-treatment protocol as recommended by her oncologist.\par \par OTHER:\par •	In the absence of other indications, Ms. Gayle should practice age-appropriate cancer screening of other organ systems as recommended for the general population.\par \par We also discussed the limitations of negative results:\par 1.	The cause of Ms. Gayle’s personal and family history of cancer remains unknown. The cancer(s) may have developed randomly, or due to environmental factors.  \par 2.	This negative result does not completely rule out a hereditary basis for the reported personal and/or family history due to limitations in technology or a variant being present in an unidentified gene. \par 3.	Variants in other genes would not be identified by this analysis, so this negative result does not rule out the likelihood of having a mutation in a different hereditary cancer gene or the possibility of ever developing cancer.\par 4.	It is possible there is a hereditary cancer predisposition gene mutation in the family, but the patient did not inherit it. \par \par We informed Ms. Gayle that our knowledge of genetics and inherited cancer conditions is changing rapidly. Therefore, we recommended that Ms. Gayle contact our office, every 2 to 3 years, to discuss relevant advances in cancer genetics.  We emphasized the importance of re-contacting us with updates regarding her personal and family history of cancer as well as any updates regarding additional cancer genetic test results performed for the patient and/or family members.  Such updates could possibly change our risk assessment and recommendations. \par \par PLAN:\par 1.	These results do not change Ms. Gayle’s medical management. Long-term management and surveillance should be based on the patient’s on- or post-treatment protocol as recommended by her oncologist (and general population guidelines for other cancers).\par 2.	A copy of genetic testing results and clinic note will be sent to Ms. Gayle.\par 3.	Ms. Gayle was encouraged to contact us every 2-3 years to discuss relevant advances in cancer genetics, or sooner if there are any changes in her personal or family history of cancer.\par \par \par For any additional questions please call Cancer Genetics at [phone]. \par \par \par Blanca Hernandes MS, [facility] [place]par Genetic Counselor, Cancer Genetics\par \par \par CC: \par Weston Teixeira MD\par Amanda Gayle\par \par \par \par \par

## 2022-02-01 ENCOUNTER — APPOINTMENT (OUTPATIENT)
Dept: RADIATION ONCOLOGY | Facility: CLINIC | Age: 67
End: 2022-02-01
Payer: MEDICARE

## 2022-02-01 VITALS
TEMPERATURE: 98 F | OXYGEN SATURATION: 99 % | BODY MASS INDEX: 33.74 KG/M2 | RESPIRATION RATE: 12 BRPM | HEART RATE: 65 BPM | WEIGHT: 215 LBS | DIASTOLIC BLOOD PRESSURE: 87 MMHG | HEIGHT: 67 IN | SYSTOLIC BLOOD PRESSURE: 144 MMHG

## 2022-02-01 PROCEDURE — 99215 OFFICE O/P EST HI 40 MIN: CPT

## 2022-02-01 NOTE — ASSESSMENT
[FreeTextEntry1] : Not applicable - this patient was seen in f/u as part of the first course of treatment

## 2022-02-01 NOTE — PHYSICAL EXAM
[] : no respiratory distress [Abdomen Soft] : soft [Nondistended] : nondistended [Abdomen Tenderness] : non-tender [Supraclavicular Lymph Nodes Enlarged Bilaterally] : supraclavicular [Axillary Lymph Nodes Enlarged Bilaterally] : axillary [Normal] : oriented to person, place and time, the affect was normal, the mood was normal and not anxious [de-identified] : I performed a limited physical examination of the breasts because the patient's surgery was farily recent and she continues with moderate edema bilaterally and associated breast tenderness; breasts are symmetric; well healing scars along the areola and then inferiorly extending into the inframammary folds bilterally; well healed surgical excisions in bilateral axillae

## 2022-02-01 NOTE — DISEASE MANAGEMENT
[Pathological] : TNM Stage: p [FreeTextEntry4] : Staging of the left breast cancer; the patient has bilateral breast cancer; right side is pT2N0 [TTNM] : 2 [NTNM] : 1a [MTNM] : 0 [IB] : IB

## 2022-02-01 NOTE — REVIEW OF SYSTEMS
[Edema Limbs: Grade 0] : Edema Limbs: Grade 0  [Fatigue: Grade 0] : Fatigue: Grade 0 [Breast Pain: Grade 0] : Breast Pain: Grade 0 [Negative] : Allergic/Immunologic

## 2022-02-01 NOTE — HISTORY OF PRESENT ILLNESS
[FreeTextEntry1] : Ms. Gayle is a 66 year old female referred here by Dr. Teixeira for management of bilateral breast cancer.\par \par On 3/13/19 she had a mammogram at [facility] and was found to have some architectural distortion in the 12:00 region of the right breast on mammography and ultrasound showed a 5 mm corresponding density.\par  \par On 3/13/19 she had an ultrasound guided right breast 12:00 core biopsy at [facility] which revealed some atypical duct hyperplasia. \par \par On 3/27/19 she underwent a wide excision lumpectomy with Dr. Teixeira at [facility] which revealed a radial sclerosing lesion but no further atypia. \par \par She continued her routine yearly follow-ups and yearly mammography and ultrasound. \par \par On 10/26/21 she underwent routine mammography and ultrasound at [facility], showing postsurgical changes in the right breast but there was a new ill-defined spiculated mass in the left breast lower inner 7:00 region measuring about 2 cm on mammography and 1.4 x 1.7 x 1.2 cm on ultrasound in the 7:00 region 6 cm from the nipple. There was also an enlarged left lower axillary lymph node measuring 4.4 x 1.3 cm. \par \par On 11/5/21 She underwent a left breast ultrasound-guided core biopsy at the 7:00-7:30, 6cm from the nipple, with the placement of a "cylindrical" shaped clip and pathology revealed an intermediate grade 1.2 cm invasive duct cancer which is ER/OK strongly positive at 95 to 100% and HER-2/merrill negative by IHC with a Ki-67 between 40 and 50%. \par \par On 11/29/21 she had a bilateral breast MRI at [facility] revealing biopsy proven malignancy in the lower slightly inner left breast measuring up to 2.1cm, with associated suspicious linear non mass enhancement extending anterior and inferior to the index mass measuring approximately 1.5cm. Linear nonmass enhancement in the central left breast. Suspicious nonmass enhancement in the lower inner and central inner right breast measuring up to 4.7 cm in AP dimension. \par \par On 12/9/21 she had an MRI biopsy, the left breast central middle area of non-mass-like enhancement showed a sclerosing papillary lesion. The right was positive for cancer, intermediate grade which was ER positive at 80%, OK positive at 40%, and HER-2/merrill negative by IHC.\par \par Dr. Teixeira stated this now bilateral breast cancer with ductal breast cancer in the left breast 7:00 region and this invasive lobular cancer in the right breast lower inner central region. He stated she could either go forward with a bilateral partial mastectomy and bilateral sentinel lymph node biopsy or could consider bilateral mastectomy in this case. \par \par On 1/21/22 she had both a a right partial mastectomy with six margin excisions and right axillary sentinel lymph node biopsies and   left partial mastectomy with six margin excisions and left axillary sentinel lymph node biopsies done along with reconstruction from Dr. Patricio. \par \par FInal surgical pathology showed: \par Procedure:  Right partial mastectomy with six margin excisions and right axillary sentinel \par   lymph node biopsies.\par Tumor Site:  Lower inner quadrant.\par Tumor Focality:  2 tumors - partial mastectomy and inferior margin excision.\par Histologic Type:  CLASSIC INVASIVE LOBULAR CARCINOMAS.\par Tumor Size:  50 mm and 4 mm.\par Histologic Grade:  6/9 \par  Tubular Differentiation:  3\par  Nuclear Pleomorphism:  2\par  Mitotic Rate:  1\par  Overall Grade:  2/3\par Ductal Carcinoma In Situ:  Not identified.\par Lymphovascular Invasion:  Not identified.\par Final Margins for Invasive Carcinoma:  Uninvolved but <1 mm.\par   Distance from closest margin:  <1 mm final inferior.\par   Distance from other margins:  8 mm anterior, superior and medial and >10 mm\par   posterior and lateral.\par Regional Lymph Node Status:  Isolated tumor cells 1+/2 axillary nodes.\par   Extranodal Extension:  Not identified.\par   Number of sentinel lymph nodes examined:  2 nodes.\par Treatment Effect:  No known presurgical therapy.\par Tumor Biomarker Results:\par   Estrogen Receptor:  99% 3+(positive).\par   Progesterone Receptor:  99% 3+ (positive).\par   HER2:  0+ (negative).\par   Ki67:  8% (low proliferation).\par Additional Pathologic Findings:  Classic lobular carcinoma in situ with ductal extension\par   and prior biopsy sites.\par Previous Specimen:  Not available.\par Distant Metastasis Confirmed Pathologically:  None known.\par Pathologic Stage Classification (AJCC 8th ed.):  p T2 N0(i+)\par \par Procedure:  Left partial mastectomy with six margin excisions and left axillary lymph node\par   biopsies.\par Tumor Site:  7-7:30.\par Tumor Focality:  Multifocal - main tumor and 4 tumor satellites.\par Histologic Type:  INVASIVE DUCTAL CARCINOMAS.\par Tumor Size:  Main tumor 27 mm and satellites 1 mm to 2 mm.\par Histologic Grade:  7/9\par  Tubular Differentiation:  3\par  Nuclear Pleomorphism:  2\par  Mitotic Rate:  2\par  Overall Grade:  2/3\par Ductal Carcinoma In Situ:  Present within and adjacent to invasive carcinoma.\par   Architectural Patterns:  Solid and cribriform.\par   Nuclear Grade:  Intermediate.\par   Necrosis:  No identified.\par   Extensive intraductal component:  Negative.\par   Size/extent of DCIS:  18 mm.\par Lymphovascular Invasion:  Not identified.\par Final Margins for Invasive Carcinoma:  Uninvolved but <1 mm.\par    Distance from closest margin:  <1 mm final inferior.	\par    Distance from other margins:  >10 mm.\par Final Margins for DCIS:  Uninvolved, >10 mm.\par Regional Lymph Node Status:  3+/4 axillary nodes.  \par   Number of Lymph Nodes with Macrometastases:  2 nodes.\par   Number of Lymph Nodes with Micrometastases:  1 node.\par   Size of Largest Metastatic Deposit:  24 mm.\par   Extranodal Extension:  Present.\par   Number of lymph nodes examined:  4  nodes. \par   Number of sentinel lymph nodes examined:  3 nodes.\par Treatment Effect:  No known presurgical therapy.\par Tumor Biomarker Results:\par   Estrogen Receptor:  95% 1-3+ (positive).\par   Progesterone Receptor:  99% 2-3+ (positive).\par   HER2:  1+ (negative).\par   Ki67:  25% (high proliferation).\par Additional Pathologic Findings:  Prior biopsy site.\par Previous Specimen:  [id number] Left breast core biopsy 7-7:30 ([facility]):\par   Invasive ductal carcinoma, ER % 3+, OK % 3+, Her2 0+, Ki67 30-40% (high).\par Distant Metastasis Confirmed Pathologically:  None known.\par Pathologic Stage Classification (AJCC 8th ed.):  p T2 N1a\par Pathologist:  MG\par  \par She is feeling well post operatively and is seeing Dr. Jones tomorrow. \par

## 2022-02-02 ENCOUNTER — NON-APPOINTMENT (OUTPATIENT)
Age: 67
End: 2022-02-02

## 2022-02-02 ENCOUNTER — APPOINTMENT (OUTPATIENT)
Dept: HEMATOLOGY ONCOLOGY | Facility: CLINIC | Age: 67
End: 2022-02-02
Payer: MEDICARE

## 2022-02-02 PROCEDURE — 99215 OFFICE O/P EST HI 40 MIN: CPT

## 2022-02-02 NOTE — PHYSICAL EXAM
[Obese] : obese [Normal] : affect appropriate [de-identified] : bilateral lumpectomy , healing well with reduction also

## 2022-02-02 NOTE — REASON FOR VISIT
[Follow-Up Visit] : a follow-up [FreeTextEntry2] : Patient presents for follow-up of breast cancer after surgery

## 2022-02-02 NOTE — ASSESSMENT
[FreeTextEntry1] : This is a 66-year-old postmenopausal woman presenting with bilateral breast cancer.\par Routine screening mammogram revealing for new abnormality on the left biopsy revealing for invasive ductal carcinoma ER/SD strongly positive HER-2/merrill negative.  Initially there was an enlarged lymph node but this was thought to be negative due to appearance and the lack of visualization on MRI.  Then on MRI in November 2021 patient was found to have a contralateral breast mass on the right measuring 4.7 cm biopsy was revealing for invasive lobular carcinoma grade 2 ER/SD positive HER-2/merrill negative.  She is now planned for bilateral lumpectomies.\par \par 1) bilateral breast cancer \par -Patient is now status post bilateral lumpectomies and sentinel lymph node biopsy\par -Reviewed the pathology for both sides,\par - the right is in T2N 0 (i) with a 5 cm invasive lobular carcinoma and 2 lymph nodes positive for isolated tumor cells.  ER/SD strongly positive at 99% respectively HER-2 negative Ki-67 8%, grade 2 , no LVI \par -The left side is positive for a 2.7 cm invasive ductal carcinoma grade 2 no lymphovascular invasion to macrometastases to the lymph node and 1 micrometastases for a grand total of 3 out of 4 this tumor is also ER/SD positive strongly at 95 and 99% respectively but the Ki-67 is higher at 25%\par -Reviewed the risk of both local and distant recurrence\par -Patient has already met with radiation oncology and will be meeting with surgery tomorrow\par -Additional surgery may be recommended for close  margins as well as for lymph node dissection on the left for 3 positive nodes .\par -We discussed that there are certain factors that indicate risk of distant recurrence and the need for systemic chemotherapy to reduce the risk of systemic recurrence.  These include size and michelle status, as well as grade and Ki67\par -patient seems to be at high risk due to size on right and michelle status on left \par -discussed that oncotype can be used to further characterize the molecular behavior of the tumor to indicate the need for systmeic chemo \par The Rxponder trial demonstarted the effectiveness even for up to 3 positive nodes \par Beyond 3 nodes there is no clear dats to use this assay ( although many suggest it is still predicitve of chemosenstaivitiy ) \par -explained that patient will likely benefit from chemo \par -will send oncotype on both tumors to obtain more information on molecular phenotype and benefit from chemo \par -either way the patient will beenfit sig from endocrine therapy discussed the mechanism efficacy and side effects of ai's at length \par -patient also will benefit from verzenio , adjuvant [facility] 2 [facility] , patient has high ki67 on left with pos nodes . discussed verzenio mechanism efficacy  and side effects at length \par -check ct and bone scan to rule out  mets \par \par 2) vit d \par check vit d \par \par 3) osteopenia \par check dexa \par \par \par 4 ) anxiety \par xanax as needed \par \par dw patient at length time spent over 1 hour \par follow up in 3 weeks for oncotype results \par \par \par  Yes

## 2022-02-02 NOTE — HISTORY OF PRESENT ILLNESS
[de-identified] : This is a 66-year-old postmenopausal woman presenting with a new diagnosis of breast cancer\par Patient goes for regular routine screening mammograms and was initally  found to have architectural distortion at the right breast 12 o'clock position, ultrasound revealing for a 5 mm density she underwent a right breast  biopsy at [facility]  on 3/2019.  which showed atypical ductal hyperplasia and then underwent wide excision in March 2019 showing a radial sclerosing lesion but no further atypia or malignancy. Dr juarez. \par ? chemo prevention ? \par  Patient follows with Dr. Jessica Hi due to the high risk status and goes for regular mammo and MRI \par \par Routine screening mammogram on October 26, 2021 showed postsurgical changes right breast but there was a new ill-defined spiculated mass left breast 7 o'clock position measuring 2 cm there is also an enlarged left axillary lymph node measuring 4.4 x 1.3 cm ( this appeared benign  was not seen on  MRI) \par \par patient underwent a left breast ultrasound-guided core biopsy on November 5, 2021  which revealed an intermediate grade invasive  ductal carcinoma ER/ME strongly +95 to 100% HER-2/merrill negative Ki-67 between 40 and 50%.\par \par MRI performed on November 29, 2021 revealing for a biopsy-proven malignancy in her left breast up to 2.1 cm with suspicious linear nonmasslike enhancement extending to the anterior inferior index mass total suspicious area of enhancement is 3.3 cm also linear non-mass enhancement left breast at the central portion of the breast MRI guided biopsy was recommended as well suspicious nonletter masslike enhancement in the inner right breast measuring 4.7 cm MRI guided biopsy was recommended.\par \par MRI guided biopsy performed on December 9, 2021 revealing for the following left breast non-mass-like enhancement showed usual ductal hyperplasia\par \par Right breast non-mass-like enhancement lower inner quadrant revealed grade 2 invasive lobular carcinoma grade 2 out of 3 invasive carcinoma was present in multiple cores up to 12 mm LCIS was also present and focal atypical ductal hyperplasia\par Tumor was ER +80% ME +40% HER-2/merrill negative Ki-67 25%.\par \par Await genetic testing , monday.  9 genes\par Daughter is negative for BRCA \par \par Patient has extensive family history of cancer including a mother who had colon cancer a paternal grandmother who had breast cancer maternal cousin who had ovarian cancer and then breast cancer.  2 paternal cousins who had breast cancer her sister had a phyllodes \par \par Meeting with dr robles tomorrow plastics \par \par \par \par \par \par \par \par \par \par \par  [de-identified] : Patient is recovering well from surgery\par \par Status post bilateral partial mastectomies with sentinel lymph node biopsy on January 21, 2022 with Dr. Jessica Hi.\par \par Right side-final pathological staging PT2N0(i) \par -Largest tumor 5 cm classic invasive lobular smaller tumor measuring 4 mm\par -Grade 2, no lymphovascular invasion\par -Margin less than 1 mm inferior\par -1 out of 2 lymph nodes revealing for isolated tumor cells\par -ER AR positive both at 99% HER-2 negative Ki-67 8%\par \par Left side - final path T2N1a\par Largest tumor 2.7 cm with satellite lesions measuring 1 mm and 2 mm invasive ductal carcinoma\par -Grade 2 no lymphovascular invasion\par 2 lymph nodes positive for macro metastases measuring 2.4 cm with extranodal extension\par 1 lymph node positive for micro met for a total of 3 out of 4 lymph nodes positive\par ER/AR +95% and 99%, HER-2 negative Ki-67 25%\par Margins were also close as well.

## 2022-02-03 ENCOUNTER — APPOINTMENT (OUTPATIENT)
Dept: BREAST CENTER | Facility: CLINIC | Age: 67
End: 2022-02-03
Payer: MEDICARE

## 2022-02-03 VITALS
SYSTOLIC BLOOD PRESSURE: 138 MMHG | BODY MASS INDEX: 33.67 KG/M2 | OXYGEN SATURATION: 98 % | HEART RATE: 75 BPM | DIASTOLIC BLOOD PRESSURE: 85 MMHG | WEIGHT: 215 LBS

## 2022-02-03 DIAGNOSIS — R92.8 OTHER ABNORMAL AND INCONCLUSIVE FINDINGS ON DIAGNOSTIC IMAGING OF BREAST: ICD-10-CM

## 2022-02-03 PROCEDURE — 99024 POSTOP FOLLOW-UP VISIT: CPT

## 2022-02-03 NOTE — REASON FOR VISIT
[Follow-Up: _____] : a [unfilled] follow-up visit [FreeTextEntry1] : The patient comes in with a strong family history of breast cancer and ovarian cancer and a personal history of undergoing a right breast 12:00 ultrasound-guided core biopsy showing atypical duct hyperplasia and then a wide excision in March 2019 just showing a radial sclerosing lesion with no further atypia.  She has a Caren model risk of 15.7%.  She was then found to have an abnormal mammography and ultrasound  in October 2021 with a suspicious approximately 2 cm left breast lower inner quadrant density which was biopsied on November 5, 2021 showing intermediate grade invasive duct cancer which was ER/NY strongly positive and HER-2/merrill negative with a Ki-67 between 40 and 50%.  She underwent an MRI November 2021 and was found to have a suspicious area in the lower inner aspect of the right breast as well as another area of enhancement in the central left breast and MRI core biopsy showed a right breast lower inner quadrant density to be an intermediate grade invasive lobular cancer which was ER/NY positive HER-2/merrill negative with a Ki-67 of 25%.  The central left breast biopsy showed a sclerosed papillary lesion.  Genetic testing was negative.  She underwent bilateral partial mastectomies with bilateral mastopexies and tissue rearrangement by Dr. Patricio with bilateral sentinel lymph node biopsies in January 2022.  Final pathology showed 4 separate satellite lesions in the left breast which was a ductal cancer with the largest focus measuring 2.7 cm but she had a positive inferior margin and had 3 sentinel nodes 2 with macrometastasis with extranodal extension and the third sentinel node with a micrometastasis and 1 negative nonsentinel lymph node making this a pathologic prognostic stage IA breast cancer.  The right breast partial mastectomy showed a 5 cm classical invasive lobular cancer with 2 sentinel nodes 1 with some isolated tumor cells and she also had a positive inferior margin with lobular cancer making this a pathologic prognostic stage IA breast cancer.  She comes in now for postop follow-up and discussion regarding these positive inferior margins and the macrometastasis in the left axillary sentinel lymph nodes.

## 2022-02-03 NOTE — ASSESSMENT
[FreeTextEntry1] : The patient is a 66-year-old G3, P3 postmenopausal white female.  She underwent menarche at age 11 and had her first child at age 24.  She underwent menopause at age 54 and never took any hormone replacement therapy.  She has a family history with her mother who had colon cancer and passed away at age 72.  Her paternal grandmother passed away from breast cancer in her 60s.  A maternal cousin had ovarian cancer at age 55 and then breast cancer at age 63 and tested BRCA negative.  2 paternal cousins had breast cancer at ages 45 and age 50.  Both tested BRCA negative.  Her sister had a phyllodes tumor excised.  I take care of both her sisters, Racheal Stevenson and Mrs. Dunn.  She was found to have some architectural distortion in the 12:00 region of the right breast on mammography and ultrasound showed a 5 mm corresponding density.  Ultrasound-guided right breast 12:00 core biopsy at [facility] showed some atypical duct hyperplasia and she underwent a wide excision on March 27, 2019 showing a radial sclerosing lesion but no further atypia.  She has a Caren model lifetime risk of 15.7% and 5-year risk of 4.1%.  She underwent routine mammography and ultrasound at [facility] on October 26, 2021 showing postsurgical changes in the right breast  but there was a new ill-defined spiculated mass in the left breast lower inner 7:00 region measuring about 2 cm on mammography and 1.4 x 1.7 x 1.2 cm on ultrasound in the 7:00 region 6 cm from the nipple.  There was also an enlarged left lower axillary lymph node measuring 4.4 x 1.3 cm.  She underwent a left breast ultrasound-guided core biopsy on November 5, 2021 with the placement of a "cylindrical" shaped clip and pathology showing an intermediate grade 1.2 cm invasive duct cancer which is ER/VT strongly positive at 95 to 100% and HER-2/merrill negative by IHC with a Ki-67 between 40 and 50%.  On exam, at that time, I could not feel any suspicious densities in either breast even though close attention to the left breast 7:00 region. I reviewed her outside ultrasound from [facility] as well as her pathology and this was an intermediate grade invasive duct cancer which appeared localized on ultrasound.  There was an enlarged lymph node underneath the left axilla but this really appeared benign and just enlarged.  MRI performed on November 29, 2021 showed another suspicious linear area of nonmasslike enhancement in the central left breast as well as an area of non-mass-like enhancement in the right breast lower inner and central region measuring about 4.7 cm.  She underwent bilateral MRI guided core biopsies performed on December 9, 2021 and the left central region showed a sclerosed papillary lesion.  The right breast lower inner quadrant showed an intermediate grade invasive lobular cancer which was ER positive at 80%, VT positive at 40%, HER-2/merrill negative by IHC with a Ki-67 of 25%.  She was seen by Dr. Sangeeta Jones.  [facility] genetic panel testing was sent and was negative.  She understood the equivalent survival rates with lumpectomy and radiation versus mastectomy and  I spoke to her about the benefits of endocrine therapy given her hormone positive status as well.  Risk/benefits of proposed surgeries were also explained to the patient.  She decided to go forward with bilateral partial mastectomies and was seen by Dr. Patricio to undergo bilateral mastopexies at the same sitting.   She underwent localizations preoperatively and then underwent bilateral partial mastectomies with bilateral mastopexies and tissue rearrangement by Dr. Patricio with bilateral sentinel lymph nodes on January 21, 2022.  The final pathology on the left showed an invasive duct carcinoma with 4 separate satellite lesions with the main tumor measuring 2.7 cm and some surrounding DCIS but she had close margins on initial excision especially on the superior aspect but had a positive inferior margin on the further margin excisions but had 3 sentinel lymph nodes 2 with macrometastasis with some extranodal extension and the third sentinel node with a micrometastasis measuring 0.3 mm and a fourth nonsentinel lymph node which was negative making this a pathologic anatomic stage IIA but pathologic prognostic stage IA breast cancer (ER/VT positive greater than 95% and HER-2/merrill negative with a Ki-67 of 25%).  The separate left breast excision of the 12:00 sclerosing papilloma showed no residual lesion and was benign showing biopsy site changes.  The right breast partial mastectomy showed a classical invasive lobular cancer measuring 5 cm and she had 2 sentinel lymph nodes 1 with some isolated tumor cells in the initial wide excision showed a close medial margin but formal margins showed a positive inferior margin with a classical invasive lobular cancer making this a pathologic anatomic stage IIA but pathologic prognostic stage IA breast cancer (ER/VT positive at 99% and HER-2/merrill negative with a Ki-67 of 8%).  On exam today, she has an excellent cosmetic result and is healed well from the bilateral partial mastectomies with reduction mastopexy approach.  The patient has already seen Dr. Mccullough from radiation oncology and Dr. Jones from medical oncology and Dr. Jones would like to send off an Oncotype on the 2 bilateral cancers.  I gave the patient a copy of the pathology went over the results.  I am concerned about these positive margins and she definitely needs further surgery.  She could undergo just excisions of these inferior margins but it may be difficult given the fact that she did have tissue rearrangements and I am also concerned about this right breast invasive lobular cancer which I think may be more extensive as well as the multiple satellite lesions in the left breast.  She technically falls into the Z-11 criteria on the left side but I am concerned about the macrometastases and lymph node involvement and I think she needs further node dissection on the left.  The patient understands the pathology at this point.  I am recommending bilateral mastectomies and I would remove both nipple areolar complexes and just use the reduction mastopexy incisions.  She would likely need expanders since she will need postmastectomy radiation on the left side.  I will speak to Dr. Payne could to low but I am pretty sure that she will recommend chemotherapy and my plan would be to just go ahead and give her systemic chemotherapy and then come back and perform the mastectomies and further node dissection in about 3 to 4 months at the end of adjuvant chemotherapy.  The patient understands and may be going for another opinion just to confirm my current treatment opinion.  In the meantime, I think she should move forward with chemotherapy.  I will need her to see Dr. Patricio again prior to any surgery to talk about expander reconstruction.  I would like to see her again in about another month.

## 2022-02-03 NOTE — PHYSICAL EXAM
[Normocephalic] : normocephalic [Atraumatic] : atraumatic [EOMI] : extra ocular movement intact [Supple] : supple [No Supraclavicular Adenopathy] : no supraclavicular adenopathy [No Cervical Adenopathy] : no cervical adenopathy [Examined in the supine and seated position] : examined in the supine and seated position [No Nipple Retraction] : no left nipple retraction [No Nipple Discharge] : no left nipple discharge [Breast Mass Right Breast ___cm] : no masses [Breast Mass Left Breast ___cm] : no masses [No Axillary Lymphadenopathy] : no left axillary lymphadenopathy [No Edema] : no edema [No Rashes] : no rashes [No Ulceration] : no ulceration [No dominant masses] : no dominant masses in right breast  [No dominant masses] : no dominant masses left breast [Breast Nipple Inversion] : nipples not inverted [Breast Nipple Retraction] : nipples not retracted [Breast Nipple Flattening] : nipples not flattened [Breast Nipple Fissures] : nipples not fissured [Breast Abnormal Lactation (Galactorrhea)] : no galactorrhea [Breast Abnormal Secretion Bloody Fluid] : no bloody discharge [Breast Abnormal Secretion Serous Fluid] : no serous discharge [Breast Abnormal Secretion Opalescent Fluid] : no milky discharge [de-identified] : On exam, the patient has done extremely well after her bilateral lower inner quadrant partial mastectomies with tissue rearrangement and reduction mastopexy oncoplastic surgery.  She is healed extremely well and has a good cosmetic result.  Unfortunately, she did have bilateral positive margins and these macrometastasis and 2 nodes in the left axilla. [de-identified] : Status post lower inner quadrant partial mastectomy with tissue rearrangement oncoplastic reduction mastopexy and sentinel lymph node biopsy.  Her wounds are healing well with no signs of any infection or hematoma. [de-identified] : Status post lower inner quadrant partial mastectomy with tissue rearrangement oncoplastic reduction mastopexy and sentinel lymph node biopsy.  Wounds are healing well with no signs of any infection or hematoma. [de-identified] : Wounds are healing well.

## 2022-02-03 NOTE — HISTORY OF PRESENT ILLNESS
[FreeTextEntry1] : The patient is a 66-year-old G3, P3 postmenopausal white female.  She underwent menarche at age 11 and had her first child at age 24.  She underwent menopause at age 54 and never took any hormone replacement therapy.  She has a family history with her mother who had colon cancer and passed away at age 72.  Her paternal grandmother passed away from breast cancer in her 60s.  A maternal cousin had ovarian cancer at age 55 and then breast cancer at age 63 and tested BRCA negative.  2 paternal cousins had breast cancer at ages 45 and age 50.  Both tested BRCA negative.  Her sister had a phyllodes tumor excised.  I take care of both her sisters, Racheal Stevenson and Mrs. Dunn.  She comes in now and was found to have some architectural distortion in the 12:00 region of the right breast on mammography and ultrasound showed a 5 mm corresponding density.  Ultrasound-guided right breast 12:00 core biopsy at [facility] showed some atypical duct hyperplasia and she underwent a wide excision on March 27, 2019 showing a radial sclerosing lesion but no further atypia.  She has a Caren model lifetime risk of 15.7% and 5-year risk of 4.1%. She underwent routine mammography and ultrasound at [facility] on October 26, 2021 showing postsurgical changes in the right breast  but there was a new ill-defined spiculated mass in the left breast lower inner 7:00 region measuring about 2 cm on mammography and 1.4 x 1.7 x 1.2 cm on ultrasound in the 7:00 region 6 cm from the nipple.  There was also an enlarged left lower axillary lymph node measuring 4.4 x 1.3 cm.  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  MRI performed on November 29, 2021 showed another suspicious linear area of nonmasslike enhancement in the central left breast as well as an area of non-mass-like enhancement in the right breast lower inner and central region measuring about 4.7 cm.  She underwent bilateral MRI guided core biopsies performed on December 9, 2021 and the left central region showed a sclerosed papillary lesion.  The right breast lower inner quadrant showed an intermediate grade invasive lobular cancer which was ER positive at 80%, AR positive at 40%, HER-2/merrill negative by IHC with a Ki-67 of 25%.  Invitae genetic panel testing was performed preoperatively and was negative.  She was seen by Dr. Sangeeta Jones preoperatively.  She underwent localizations preoperatively and then underwent bilateral partial mastectomies with bilateral mastopexies and tissue rearrangement by Dr. Patricio with bilateral sentinel lymph nodes on January 21, 2022.  The final pathology on the left showed an invasive duct carcinoma with 4 separate satellite lesions with the main tumor measuring 2.7 cm and some surrounding DCIS but she had close margins on initial excision especially on the superior aspect but had a positive inferior margin on the further margin excisions but had 3 sentinel lymph nodes 2 with macrometastasis with some extranodal extension and the third sentinel node with a micrometastasis measuring 0.3 mm and a fourth nonsentinel lymph node which was negative making this a pathologic anatomic stage IIA but pathologic prognostic stage IA breast cancer (ER/AR positive greater than 95% and HER-2/merrill negative with a Ki-67 of 25%).  The separate left breast excision of the 12:00 sclerosing papilloma showed no residual lesion and was benign showing biopsy site changes.  The right breast partial mastectomy showed a classical invasive lobular cancer measuring 5 cm and she had 2 sentinel lymph nodes 1 with some isolated tumor cells in the initial wide excision showed a close medial margin but formal margins showed a positive inferior margin with a classical invasive lobular cancer making this a pathologic anatomic stage IIA but pathologic prognostic stage IA breast cancer (ER/AR positive at 99% and HER-2/merrill negative with a Ki-67 of 8%).  She comes in now for postop follow-up and discussion regarding the pathology showing positive inferior margins and these positive left axillary sentinel lymph nodes with macrometastases.

## 2022-02-04 LAB
CANCER AG15-3 SERPL-ACNC: 29.4 U/ML
CEA SERPL-MCNC: 1.9 NG/ML
FERRITIN SERPL-MCNC: 132 NG/ML

## 2022-02-15 ENCOUNTER — APPOINTMENT (OUTPATIENT)
Dept: HEMATOLOGY ONCOLOGY | Facility: CLINIC | Age: 67
End: 2022-02-15

## 2022-03-01 ENCOUNTER — APPOINTMENT (OUTPATIENT)
Dept: HEMATOLOGY ONCOLOGY | Facility: CLINIC | Age: 67
End: 2022-03-01

## 2022-03-01 ENCOUNTER — NON-APPOINTMENT (OUTPATIENT)
Age: 67
End: 2022-03-01

## 2022-03-01 VITALS
TEMPERATURE: 98.3 F | HEART RATE: 56 BPM | OXYGEN SATURATION: 96 % | HEIGHT: 67 IN | BODY MASS INDEX: 33.59 KG/M2 | SYSTOLIC BLOOD PRESSURE: 169 MMHG | DIASTOLIC BLOOD PRESSURE: 94 MMHG | RESPIRATION RATE: 17 BRPM | WEIGHT: 214 LBS

## 2022-03-09 ENCOUNTER — NON-APPOINTMENT (OUTPATIENT)
Age: 67
End: 2022-03-09

## 2022-03-09 ENCOUNTER — APPOINTMENT (OUTPATIENT)
Dept: HEMATOLOGY ONCOLOGY | Facility: CLINIC | Age: 67
End: 2022-03-09
Payer: MEDICARE

## 2022-03-09 PROCEDURE — 99214 OFFICE O/P EST MOD 30 MIN: CPT | Mod: 95

## 2022-03-09 NOTE — HISTORY OF PRESENT ILLNESS
[de-identified] : This is a 66-year-old postmenopausal woman presenting with a new diagnosis of breast cancer\par Patient goes for regular routine screening mammograms and was initally  found to have architectural distortion at the right breast 12 o'clock position, ultrasound revealing for a 5 mm density she underwent a right breast  biopsy at [facility]  on 3/2019.  which showed atypical ductal hyperplasia and then underwent wide excision in March 2019 showing a radial sclerosing lesion but no further atypia or malignancy. Dr juarez. \par ? chemo prevention ? \par  Patient follows with Dr. Jessica Hi due to the high risk status and goes for regular mammo and MRI \par \par Routine screening mammogram on October 26, 2021 showed postsurgical changes right breast but there was a new ill-defined spiculated mass left breast 7 o'clock position measuring 2 cm there is also an enlarged left axillary lymph node measuring 4.4 x 1.3 cm ( this appeared benign  was not seen on  MRI) \par \par patient underwent a left breast ultrasound-guided core biopsy on November 5, 2021  which revealed an intermediate grade invasive  ductal carcinoma ER/AK strongly +95 to 100% HER-2/merrill negative Ki-67 between 40 and 50%.\par \par MRI performed on November 29, 2021 revealing for a biopsy-proven malignancy in her left breast up to 2.1 cm with suspicious linear nonmasslike enhancement extending to the anterior inferior index mass total suspicious area of enhancement is 3.3 cm also linear non-mass enhancement left breast at the central portion of the breast MRI guided biopsy was recommended as well suspicious nonletter masslike enhancement in the inner right breast measuring 4.7 cm MRI guided biopsy was recommended.\par \par MRI guided biopsy performed on December 9, 2021 revealing for the following left breast non-mass-like enhancement showed usual ductal hyperplasia\par \par Right breast non-mass-like enhancement lower inner quadrant revealed grade 2 invasive lobular carcinoma grade 2 out of 3 invasive carcinoma was present in multiple cores up to 12 mm LCIS was also present and focal atypical ductal hyperplasia\par Tumor was ER +80% AK +40% HER-2/merrill negative Ki-67 25%.\par \par Await genetic testing , monday.  9 genes\par Daughter is negative for BRCA \par \par Patient has extensive family history of cancer including a mother who had colon cancer a paternal grandmother who had breast cancer maternal cousin who had ovarian cancer and then breast cancer.  2 paternal cousins who had breast cancer her sister had a phyllodes \par \par Status post bilateral partial mastectomies with sentinel lymph node biopsy on January 21, 2022 with Dr. Jessica Hi.\par \par Right side-final pathological staging PT2N0(i) \par -Largest tumor 5 cm classic invasive lobular smaller tumor measuring 4 mm\par -Grade 2, no lymphovascular invasion\par -Margin less than 1 mm inferior\par -1 out of 2 lymph nodes revealing for isolated tumor cells\par -ER AK positive both at 99% HER-2 negative Ki-67 8%\par \par Left side - final path T2N1a\par Largest tumor 2.7 cm with satellite lesions measuring 1 mm and 2 mm invasive ductal carcinoma\par -Grade 2 no lymphovascular invasion\par 2 lymph nodes positive for macro metastases measuring 2.4 cm with extranodal extension\par 1 lymph node positive for micro met for a total of 3 out of 4 lymph nodes positive\par ER/AK +95% and 99%, HER-2 negative Ki-67 25%\par Margins were also close as well.\par \par \par \par \par \par \par \par \par \par  [de-identified] : Patient continues to feel well she denies any new symptoms\par Oncotype DX score on the left was low risk at 17 equating to a 15% risk of distant recurrence.\par Oncotype DX score on the right was low risk at 10 with a 3% risk of distant recurrence over the next 10 years.\par \par CT of the chest abdomen pelvis show hypoattenuation lesion in the spleen measuring 1.5 cm but no evidence of metastatic disease\par Bone scan negative for any evidence of metastatic disease.\par \par ca 15-3 stable elevated at 29.4

## 2022-03-09 NOTE — REASON FOR VISIT
[Home] : at home, [unfilled] , at the time of the visit. [Medical Office: ([facility])___] : at the medical office located in  [Other:____] : [unfilled] [Verbal consent obtained from patient] : the patient, [unfilled] [Follow-Up Visit] : a follow-up [FreeTextEntry2] : Here for follow up of breast cancer

## 2022-03-09 NOTE — ASSESSMENT
[FreeTextEntry1] : This is a 66-year-old postmenopausal woman presenting with bilateral breast cancer.\par Routine screening mammogram revealing for new abnormality on the left biopsy revealing for invasive ductal carcinoma ER/KS strongly positive HER-2/merrill negative.  Initially there was an enlarged lymph node but this was thought to be negative due to appearance and the lack of visualization on MRI.  Then on MRI in November 2021 patient was found to have a contralateral breast mass on the right measuring 4.7 cm biopsy was revealing for invasive lobular carcinoma grade 2 ER/KS positive HER-2/merrill negative.  She is now planned for bilateral lumpectomies.\par \par 1) bilateral breast cancer \par -Patient is now status post bilateral lumpectomies and sentinel lymph node biopsy\par -Reviewed the pathology for both sides,\par - the right is in T2N 0 (i) with a 5 cm invasive lobular carcinoma and 2 lymph nodes positive for isolated tumor cells.  ER/KS strongly positive at 99% respectively HER-2 negative Ki-67 8%, grade 2 , no LVI \par -The left side is positive for a 2.7 cm invasive ductal carcinoma grade 2 no lymphovascular invasion to macrometastases to the lymph node and 1 micrometastases for a grand total of 3 out of 4 this tumor is also ER/KS positive strongly at 95 and 99% respectively but the Ki-67 is higher at 25%\par -Patient met with both radiation oncology and surgery\par -She will need additional surgery to clear up the margins and also for michelle dissection on the left.\par -In addition she will need postmastectomy radiation\par -Oncotype DX score was reviewed today at length with the patient and her daughter on the left the score is low at 17 which equates to a 15% risk of distant recurrence in the next 10 years on the right the scores also low risk at 10 with a 3% risk of distant recurrence over the next 10 years.\par -Explained that Oncotype DX score is valid in up to 3+ lymph nodes.  Explained that if there are additional lymph nodes particularly on the left given that they are already 3 lymph nodes positive that the Oncotype DX score would be less acceptable as there is no high-quality data to support its use in this setting.  The Rxponder trial included up to 3+ lymph nodes\par -Because both Oncotype's are low risk this would indicate that based on the molecular biology there would be no significant benefit to giving chemotherapy to reduce the risk of recurrence.  However in looking at the clinical risks of both cancers the left with the high Ki-67 and the 3+ lymph nodes clinically the patient is high risk.\par -This was discussed at length with the patient and her daughter as well as with Dr. Jessica Hi.\par -Our plan will be to proceed with a MammaPrint on the left to verify that the tumor is a luminal way and that there is no benefit to doing chemo.\par If the MammaPrint is low risk our plan will be to go forward with the completion mastectomy and michelle dissection\par Following surgery we will reassess for the need for systemic therapy based on clinical risk.  Particularly as mentioned above if there are more than 3+ lymph nodes\par -We also discussed enrollment on the Flex trial and patient is in favor of going forward with enrollment and she will signed the consent next time she is in the office.\par - ct and bone scan negative for mets, discussed cerianna petct but unable to perform due to lack of insurance covering \par -will follow up scans in about 3 months to follow up splenic lesions \par -will follow markers \par -dr juarez will call patient today also to discuss plan \par \par 2) vit d \par cont vit d \par \par 3) osteopenia \par check dexa \par \par \par 4 ) anxiety \par xanax as needed \par \par dw patient at length time spent over 40 mins \par Patient is aware that telehealth is not a substitute for an in person exam and history. There may be implications in terms of future diagnosis prognosis and management. \par follow up in 2 weeks to discuss the mammaprint \par \par \par

## 2022-03-13 ENCOUNTER — FORM ENCOUNTER (OUTPATIENT)
Age: 67
End: 2022-03-13

## 2022-03-17 ENCOUNTER — FORM ENCOUNTER (OUTPATIENT)
Age: 67
End: 2022-03-17

## 2022-03-27 ENCOUNTER — FORM ENCOUNTER (OUTPATIENT)
Age: 67
End: 2022-03-27

## 2022-03-31 ENCOUNTER — APPOINTMENT (OUTPATIENT)
Dept: BREAST CENTER | Facility: CLINIC | Age: 67
End: 2022-03-31
Payer: MEDICARE

## 2022-03-31 VITALS
HEART RATE: 70 BPM | BODY MASS INDEX: 33.52 KG/M2 | OXYGEN SATURATION: 96 % | WEIGHT: 214 LBS | SYSTOLIC BLOOD PRESSURE: 149 MMHG | DIASTOLIC BLOOD PRESSURE: 86 MMHG

## 2022-03-31 DIAGNOSIS — Z90.13 ACQUIRED ABSENCE OF BILATERAL BREASTS AND NIPPLES: ICD-10-CM

## 2022-03-31 PROCEDURE — 99024 POSTOP FOLLOW-UP VISIT: CPT

## 2022-03-31 NOTE — HISTORY OF PRESENT ILLNESS
[FreeTextEntry1] : The patient is a 66-year-old G3, P3 postmenopausal white female.  She underwent menarche at age 11 and had her first child at age 24.  She underwent menopause at age 54 and never took any hormone replacement therapy.  She has a family history with her mother who had colon cancer and passed away at age 72.  Her paternal grandmother passed away from breast cancer in her 60s.  A maternal cousin had ovarian cancer at age 55 and then breast cancer at age 63 and tested BRCA negative.  2 paternal cousins had breast cancer at ages 45 and age 50.  Both tested BRCA negative.  Her sister had a phyllodes tumor excised.  I take care of both her sisters, Racheal Stevenson and Mrs. Dunn.  She comes in now and was found to have some architectural distortion in the 12:00 region of the right breast on mammography and ultrasound showed a 5 mm corresponding density.  Ultrasound-guided right breast 12:00 core biopsy at [facility] showed some atypical duct hyperplasia and she underwent a wide excision on March 27, 2019 showing a radial sclerosing lesion but no further atypia.  She has a Caren model lifetime risk of 15.7% and 5-year risk of 4.1%. She underwent routine mammography and ultrasound at [facility] on October 26, 2021 showing postsurgical changes in the right breast  but there was a new ill-defined spiculated mass in the left breast lower inner 7:00 region measuring about 2 cm on mammography and 1.4 x 1.7 x 1.2 cm on ultrasound in the 7:00 region 6 cm from the nipple.  There was also an enlarged left lower axillary lymph node measuring 4.4 x 1.3 cm.  She underwent a left breast ultrasound-guided core biopsy on November 5, 2021 with the placement of a "cylindrical" shaped clip and pathology showing an intermediate grade 1.2 cm invasive duct cancer which was ER/UT strongly positive at 95 to 100% and HER-2/merrill negative by IHC with a Ki-67 between 40 and 50%.  MRI performed on November 29, 2021 showed another suspicious linear area of nonmasslike enhancement in the central left breast as well as an area of non-mass-like enhancement in the right breast lower inner and central region measuring about 4.7 cm.  She underwent bilateral MRI guided core biopsies performed on December 9, 2021 and the left central region showed a sclerosed papillary lesion.  The right breast lower inner quadrant showed an intermediate grade invasive lobular cancer which was ER positive at 80%, UT positive at 40%, HER-2/merrill negative by IHC with a Ki-67 of 25%.  Invitae genetic panel testing was performed preoperatively and was negative.  She was seen by Dr. Sangeeta Jones preoperatively.  She underwent localizations preoperatively and then underwent bilateral partial mastectomies with bilateral mastopexies and tissue rearrangement by Dr. Patricio with bilateral sentinel lymph nodes on January 21, 2022.  The final pathology on the left showed an invasive duct carcinoma with 4 separate satellite lesions with the main tumor measuring 2.7 cm and some surrounding DCIS but she had close margins on initial excision especially on the superior aspect but had a positive inferior margin on the further margin excisions but had 3 sentinel lymph nodes 2 with macrometastasis with some extranodal extension and the third sentinel node with a micrometastasis measuring 0.3 mm and a fourth nonsentinel lymph node which was negative making this a pathologic anatomic stage IIA but pathologic prognostic stage IA breast cancer (ER/UT positive greater than 95% and HER-2/merrill negative with a Ki-67 of 25%).  The separate left breast excision of the 12:00 sclerosing papilloma showed no residual lesion and was benign showing biopsy site changes.  The right breast partial mastectomy showed a classical invasive lobular cancer measuring 5 cm and she had 2 sentinel lymph nodes 1 with some isolated tumor cells in the initial wide excision showed a close medial margin but formal margins showed a positive inferior margin with a classical invasive lobular cancer making this a pathologic anatomic stage IIA but pathologic prognostic stage IA breast cancer (ER/UT positive at 99% and HER-2/merrill negative with a Ki-67 of 8%).  She underwent Oncotype recurrence scores by Dr. Jones and the right lobular cancer had a recurrence score of 10 and the left node positive ductal cancer had a recurrence score of 17.  She was entered into the FLEX trial and MammaPrint showed a low risk luminal A subtype on each bilateral cancer.  She comes in now for a presurgical discussion.

## 2022-03-31 NOTE — PHYSICAL EXAM
[Normocephalic] : normocephalic [Atraumatic] : atraumatic [EOMI] : extra ocular movement intact [Supple] : supple [No Supraclavicular Adenopathy] : no supraclavicular adenopathy [No Cervical Adenopathy] : no cervical adenopathy [Examined in the supine and seated position] : examined in the supine and seated position [No dominant masses] : no dominant masses in right breast  [No dominant masses] : no dominant masses left breast [No Nipple Retraction] : no left nipple retraction [Breast Mass Right Breast ___cm] : no masses [No Nipple Discharge] : no left nipple discharge [Breast Mass Left Breast ___cm] : no masses [No Axillary Lymphadenopathy] : no left axillary lymphadenopathy [No Edema] : no edema [No Rashes] : no rashes [No Ulceration] : no ulceration [Breast Nipple Inversion] : nipples not inverted [Breast Nipple Retraction] : nipples not retracted [Breast Nipple Flattening] : nipples not flattened [Breast Nipple Fissures] : nipples not fissured [Breast Abnormal Lactation (Galactorrhea)] : no galactorrhea [Breast Abnormal Secretion Bloody Fluid] : no bloody discharge [Breast Abnormal Secretion Serous Fluid] : no serous discharge [Breast Abnormal Secretion Opalescent Fluid] : no milky discharge [de-identified] : On exam, the patient has done extremely well after her bilateral lower inner quadrant partial mastectomies with tissue rearrangement and reduction mastopexy oncoplastic surgery.  She is healed extremely well and has a good cosmetic result.  Unfortunately, she did have bilateral positive margins and these macrometastasis and 2 nodes in the left axilla. [de-identified] : Status post lower inner quadrant partial mastectomy with tissue rearrangement oncoplastic reduction mastopexy and sentinel lymph node biopsy.  Her wounds are healing well with no signs of any infection or hematoma. [de-identified] : Status post lower inner quadrant partial mastectomy with tissue rearrangement oncoplastic reduction mastopexy and sentinel lymph node biopsy.  Wounds are healing well with no signs of any infection or hematoma. [de-identified] : Wounds are healing well.

## 2022-03-31 NOTE — REASON FOR VISIT
[Post Op: _________] : a [unfilled] post op visit [FreeTextEntry1] : The patient comes in with a strong family history of breast cancer and ovarian cancer and a personal history of undergoing a right breast 12:00 ultrasound-guided core biopsy showing atypical duct hyperplasia and then a wide excision in March 2019 just showing a radial sclerosing lesion with no further atypia.  She has a Caren model risk of 15.7%.  She was then found to have an abnormal mammography and ultrasound  in October 2021 with a suspicious approximately 2 cm left breast lower inner quadrant density which was biopsied on November 5, 2021 showing intermediate grade invasive duct cancer which was ER/RI strongly positive and HER-2/merrill negative with a Ki-67 between 40 and 50%.  She underwent an MRI November 2021 and was found to have a suspicious area in the lower inner aspect of the right breast as well as another area of enhancement in the central left breast and MRI core biopsy showed a right breast lower inner quadrant density to be an intermediate grade invasive lobular cancer which was ER/RI positive HER-2/merrill negative with a Ki-67 of 25%.  The central left breast biopsy showed a sclerosed papillary lesion.  Genetic testing was negative.  She underwent bilateral partial mastectomies with bilateral mastopexies and tissue rearrangement by Dr. Patricio with bilateral sentinel lymph node biopsies in January 2022.  Final pathology showed 4 separate satellite lesions in the left breast which was a ductal cancer with the largest focus measuring 2.7 cm but she had a positive inferior margin and had 3 sentinel nodes 2 with macrometastasis with extranodal extension and the third sentinel node with a micrometastasis and 1 negative nonsentinel lymph node making this a pathologic prognostic stage IB breast cancer.  The right breast partial mastectomy showed a 5 cm classical invasive lobular cancer with 2 sentinel nodes 1 with some isolated tumor cells and she also had a positive inferior margin with lobular cancer making this a pathologic prognostic stage IA breast cancer.  She had bilateral positive inferior margins and macrometastasis in the left axillary sentinel lymph nodes.  She underwent Oncotype recurrence scores by Dr. Jones and the right lobular cancer had a recurrence score of 10 and the left node positive ductal cancer had a recurrence score of 17.  She was entered into the FLEX trial and MammaPrint showed a low risk luminal A subtype.  She comes in now for presurgical discussion.

## 2022-03-31 NOTE — ADDENDUM
[FreeTextEntry1] : The patient was consented to the FLEX registry trial IRB #[id number] here at [facility].  The patient understood the fact that the study does involve research and understood the schedule of the trial and the procedures involved.  The main risks of the study and the fact that all risks may not be known at this time were described to the patient.  She understood that new information may affect her willingness to continue on study and this would be presented to her as soon as it is available.  Benefits and alternatives to participating in the trial were presented to the patient as well as all risk/benefits.  Confidentiality of her personal information were also described to the patient.  Contact information was provided to the patient for any questions regarding the study or her rights while on the study.  She understood that her participation in the trial was voluntary and that she can refuse or withdraw at any time without penalty or loss of benefits.  She was given ample time to ask questions prior to signing and all her questions were answered to the patient's satisfaction.  The patient as well as myself and a witness signed the informed consent document and a copy of the signed consent form was given to the patient.\par

## 2022-03-31 NOTE — ASSESSMENT
[FreeTextEntry1] : The patient is a 66-year-old G3, P3 postmenopausal white female.  She underwent menarche at age 11 and had her first child at age 24.  She underwent menopause at age 54 and never took any hormone replacement therapy.  She has a family history with her mother who had colon cancer and passed away at age 72.  Her paternal grandmother passed away from breast cancer in her 60s.  A maternal cousin had ovarian cancer at age 55 and then breast cancer at age 63 and tested BRCA negative.  2 paternal cousins had breast cancer at ages 45 and age 50.  Both tested BRCA negative.  Her sister had a phyllodes tumor excised.  I take care of both her sisters, Racheal Stevenson and Mrs. Dunn.  She was found to have some architectural distortion in the 12:00 region of the right breast on mammography and ultrasound showed a 5 mm corresponding density.  Ultrasound-guided right breast 12:00 core biopsy at [facility] showed some atypical duct hyperplasia and she underwent a wide excision on March 27, 2019 showing a radial sclerosing lesion but no further atypia.  She has a Caren model lifetime risk of 15.7% and 5-year risk of 4.1%.  She underwent routine mammography and ultrasound at [facility] on October 26, 2021 showing postsurgical changes in the right breast  but there was a new ill-defined spiculated mass in the left breast lower inner 7:00 region measuring about 2 cm on mammography and 1.4 x 1.7 x 1.2 cm on ultrasound in the 7:00 region 6 cm from the nipple.  There was also an enlarged left lower axillary lymph node measuring 4.4 x 1.3 cm.  She underwent a left breast ultrasound-guided core biopsy on November 5, 2021 with the placement of a "cylindrical" shaped clip and pathology showing an intermediate grade 1.2 cm invasive duct cancer which is ER/MI strongly positive at 95 to 100% and HER-2/merrill negative by IHC with a Ki-67 between 40 and 50%.  On exam, at that time, I could not feel any suspicious densities in either breast even though close attention to the left breast 7:00 region. I reviewed her outside ultrasound from [facility] as well as her pathology and this was an intermediate grade invasive duct cancer which appeared localized on ultrasound.  There was an enlarged lymph node underneath the left axilla but this really appeared benign and just enlarged.  MRI performed on November 29, 2021 showed another suspicious linear area of nonmasslike enhancement in the central left breast as well as an area of non-mass-like enhancement in the right breast lower inner and central region measuring about 4.7 cm.  She underwent bilateral MRI guided core biopsies performed on December 9, 2021 and the left central region showed a sclerosed papillary lesion.  The right breast lower inner quadrant showed an intermediate grade invasive lobular cancer which was ER positive at 80%, MI positive at 40%, HER-2/merrill negative by IHC with a Ki-67 of 25%.  She was seen by Dr. Sangeeta Jones.  [employer] genetic panel testing was sent and was negative.  She understood the equivalent survival rates with lumpectomy and radiation versus mastectomy and  I spoke to her about the benefits of endocrine therapy given her hormone positive status as well.  Risk/benefits of proposed surgeries were also explained to the patient.  She decided to go forward with bilateral partial mastectomies and was seen by Dr. Patricio to undergo bilateral mastopexies at the same sitting.   She underwent localizations preoperatively and then underwent bilateral partial mastectomies with bilateral mastopexies and tissue rearrangement by Dr. Patricio with bilateral sentinel lymph nodes on January 21, 2022.  The final pathology on the left showed an invasive duct carcinoma with 4 separate satellite lesions with the main tumor measuring 2.7 cm and some surrounding DCIS but she had close margins on initial excision especially on the superior aspect but had a positive inferior margin on the further margin excisions but had 3 sentinel lymph nodes 2 with macrometastasis with some extranodal extension and the third sentinel node with a micrometastasis measuring 0.3 mm and a fourth nonsentinel lymph node which was negative making this a pathologic anatomic stage IIA but pathologic prognostic stage IA breast cancer (ER/MI positive greater than 95% and HER-2/merrill negative with a Ki-67 of 25%).  The separate left breast excision of the 12:00 sclerosing papilloma showed no residual lesion and was benign showing biopsy site changes.  The right breast partial mastectomy showed a classical invasive lobular cancer measuring 5 cm and she had 2 sentinel lymph nodes 1 with some isolated tumor cells in the initial wide excision showed a close medial margin but formal margins showed a positive inferior margin with a classical invasive lobular cancer making this a pathologic anatomic stage IIA but pathologic prognostic stage IA breast cancer (ER/MI positive at 99% and HER-2/merrill negative with a Ki-67 of 8%).  On exam today, she has an excellent cosmetic result and is healed well from the bilateral partial mastectomies with reduction mastopexy approach.  The patient has already seen Dr. Mccullough from radiation oncology and Dr. Jones.  She underwent Oncotype recurrence scores by Dr. Jones and the right lobular cancer had a recurrence score of 10 and the left node positive ductal cancer had a recurrence score of 17.  She was entered into the FLEX trial and MammaPrint showed a low risk luminal A subtype on both bilateral cancers.  I had a discussion with Dr. Jones and we have decided to move forward with surgery given the improved prognostic genomic profiles.  Due to the difficulty in determining the inferior margin as well as the multifocal nature is of her cancers, I am recommending bilateral mastectomies and I would remove both nipple areolar complexes and just use the reduction mastopexy incisions.  She would likely need expanders since she will need postmastectomy radiation on the left side.  She has already seen Dr. Patricio in regards to reconstruction options.  After surgery, we can then get a better idea of the need for adjuvant chemotherapy.  I spoke to the patient and she is a full understanding of the planned surgery and agrees to proceed as planned.  She did have a second opinion down to [facility] who agreed with the axillary lymph node dissection but they felt that she could possibly just undergo radiation at this point but given the multifocal nature of this bilateral breast cancer I am uncomfortable with just radiation given the margin status.  It be too difficult to try to find this inferior margin at this point given the tissue transfer rearrangement.  She is currently scheduled for surgery on April 11, 2022.  All risk/benefits of the planned procedure were explained to the patient and she has a full understanding.  Did have her sign consent for the FLEX trial in the office today.

## 2022-04-11 ENCOUNTER — APPOINTMENT (OUTPATIENT)
Dept: BREAST CENTER | Facility: HOSPITAL | Age: 67
End: 2022-04-11

## 2022-04-12 ENCOUNTER — TRANSCRIPTION ENCOUNTER (OUTPATIENT)
Age: 67
End: 2022-04-12

## 2022-04-18 ENCOUNTER — APPOINTMENT (OUTPATIENT)
Dept: BREAST CENTER | Facility: CLINIC | Age: 67
End: 2022-04-18
Payer: MEDICARE

## 2022-04-18 VITALS
HEART RATE: 69 BPM | SYSTOLIC BLOOD PRESSURE: 181 MMHG | DIASTOLIC BLOOD PRESSURE: 98 MMHG | HEIGHT: 67 IN | WEIGHT: 220 LBS | BODY MASS INDEX: 34.53 KG/M2

## 2022-04-18 PROCEDURE — 99024 POSTOP FOLLOW-UP VISIT: CPT

## 2022-04-18 NOTE — PHYSICAL EXAM
[Normocephalic] : normocephalic [Atraumatic] : atraumatic [EOMI] : extra ocular movement intact [Supple] : supple [No Supraclavicular Adenopathy] : no supraclavicular adenopathy [No Cervical Adenopathy] : no cervical adenopathy [Examined in the supine and seated position] : examined in the supine and seated position [Breast Mass Right Breast ___cm] : no masses [Breast Mass Left Breast ___cm] : no masses [No Axillary Lymphadenopathy] : no left axillary lymphadenopathy [No Edema] : no edema [No Rashes] : no rashes [No Ulceration] : no ulceration [de-identified] : On exam, the patient is doing well after her bilateral total mastectomies with left axillary lymph node dissection and bilateral prepectoral expander reconstructions.  Wounds are healing well with viable skin flaps and her drains are still in place and putting out too much to be removed today [de-identified] : Status post lower inner quadrant partial mastectomy with tissue rearrangement oncoplastic reduction mastopexy and sentinel lymph node biopsy followed by later completion mastectomy for positive margins and prepectoral expander reconstruction.  Her skin flaps are viable and her wounds are healing well. [de-identified] : Status post lower inner quadrant partial mastectomy with tissue rearrangement oncoplastic reduction mastopexy and sentinel lymph node biopsy followed by completion total mastectomy with left axillary lymph node dissection and prepectoral expander reconstruction due to positive margins and positive sentinel lymph nodes.  Her wounds are healing well and her skin flaps are viable. [de-identified] : Wounds are healing well.

## 2022-04-18 NOTE — HISTORY OF PRESENT ILLNESS
[FreeTextEntry1] : The patient is a 66-year-old G3, P3 postmenopausal white female.  She underwent menarche at age 11 and had her first child at age 24.  She underwent menopause at age 54 and never took any hormone replacement therapy.  She has a family history with her mother who had colon cancer and passed away at age 72.  Her paternal grandmother passed away from breast cancer in her 60s.  A maternal cousin had ovarian cancer at age 55 and then breast cancer at age 63 and tested BRCA negative.  2 paternal cousins had breast cancer at ages 45 and age 50.  Both tested BRCA negative.  Her sister had a phyllodes tumor excised.  I take care of both her sisters, Racheal Stevenson and Mrs. Dunn.  She comes in now and was found to have some architectural distortion in the 12:00 region of the right breast on mammography and ultrasound showed a 5 mm corresponding density.  Ultrasound-guided right breast 12:00 core biopsy at [facility] showed some atypical duct hyperplasia and she underwent a wide excision on March 27, 2019 showing a radial sclerosing lesion but no further atypia.  She has a Caren model lifetime risk of 15.7% and 5-year risk of 4.1%. She underwent routine mammography and ultrasound at [facility] on October 26, 2021 showing postsurgical changes in the right breast  but there was a new ill-defined spiculated mass in the left breast lower inner 7:00 region measuring about 2 cm on mammography and 1.4 x 1.7 x 1.2 cm on ultrasound in the 7:00 region 6 cm from the nipple.  There was also an enlarged left lower axillary lymph node measuring 4.4 x 1.3 cm.  She underwent a left breast ultrasound-guided core biopsy on November 5, 2021 with the placement of a "cylindrical" shaped clip and pathology showing an intermediate grade 1.2 cm invasive duct cancer which was ER/NC strongly positive at 95 to 100% and HER-2/merrill negative by IHC with a Ki-67 between 40 and 50%.  MRI performed on November 29, 2021 showed another suspicious linear area of nonmasslike enhancement in the central left breast as well as an area of non-mass-like enhancement in the right breast lower inner and central region measuring about 4.7 cm.  She underwent bilateral MRI guided core biopsies performed on December 9, 2021 and the left central region showed a sclerosed papillary lesion.  The right breast lower inner quadrant showed an intermediate grade invasive lobular cancer which was ER positive at 80%, NC positive at 40%, HER-2/merrill negative by IHC with a Ki-67 of 25%.  [employer] genetic panel testing was performed preoperatively and was negative.  She was seen by Dr. Sangeeta Jones preoperatively.  She underwent localizations preoperatively and then underwent bilateral partial mastectomies with bilateral mastopexies and tissue rearrangement by Dr. Patricio with bilateral sentinel lymph nodes on January 21, 2022.  The final pathology on the left showed an invasive duct carcinoma with 4 separate satellite lesions with the main tumor measuring 2.7 cm and some surrounding DCIS but she had close margins on initial excision especially on the superior aspect but had a positive inferior margin on the further margin excisions but had 3 sentinel lymph nodes 2 with macrometastasis with some extranodal extension and the third sentinel node with a micrometastasis measuring 0.3 mm and a fourth nonsentinel lymph node which was negative making this a pathologic anatomic stage IIA but pathologic prognostic stage IA breast cancer (ER/NC positive greater than 95% and HER-2/merrill negative with a Ki-67 of 25%).  The separate left breast excision of the 12:00 sclerosing papilloma showed no residual lesion and was benign showing biopsy site changes.  The right breast partial mastectomy showed a classical invasive lobular cancer measuring 5 cm and she had 2 sentinel lymph nodes 1 with some isolated tumor cells in the initial wide excision showed a close medial margin but formal margins showed a positive inferior margin with a classical invasive lobular cancer making this a pathologic anatomic stage IIA but pathologic prognostic stage IA breast cancer (ER/NC positive at 99% and HER-2/merrill negative with a Ki-67 of 8%).  She underwent Oncotype recurrence scores by Dr. Jones and the right lobular cancer had a recurrence score of 10 and the left node positive ductal cancer had a recurrence score of 17.  She was entered into the FLEX trial and MammaPrint showed a low risk luminal A subtype on each bilateral cancer.  After discussion with the medical oncologist, it was decided to go forward with bilateral mastectomies with left axillary lymph node dissection and she had bilateral prepectoral expander reconstructions with AlloDerm performed on April 11, 2022. The final pathology showed no further cancer in either mastectomy specimen and she had 9 negative left axillary lymph nodes however a separate lower inner quadrant anterior margin on the right side showed a 5 mm focus of invasive lobular cancer which focally extended to the anterior margin. She comes in now for postop follow-up.

## 2022-04-18 NOTE — ASSESSMENT
[FreeTextEntry1] : The patient is a 66-year-old G3, P3 postmenopausal white female.  She underwent menarche at age 11 and had her first child at age 24.  She underwent menopause at age 54 and never took any hormone replacement therapy.  She has a family history with her mother who had colon cancer and passed away at age 72.  Her paternal grandmother passed away from breast cancer in her 60s.  A maternal cousin had ovarian cancer at age 55 and then breast cancer at age 63 and tested BRCA negative.  2 paternal cousins had breast cancer at ages 45 and age 50.  Both tested BRCA negative.  Her sister had a phyllodes tumor excised.  I take care of both her sisters, Racheal Stevenson and Mrs. Dunn.  She was found to have some architectural distortion in the 12:00 region of the right breast on mammography and ultrasound showed a 5 mm corresponding density.  Ultrasound-guided right breast 12:00 core biopsy at [facility] showed some atypical duct hyperplasia and she underwent a wide excision on March 27, 2019 showing a radial sclerosing lesion but no further atypia.  She has a Caren model lifetime risk of 15.7% and 5-year risk of 4.1%.  She underwent routine mammography and ultrasound at [facility] on October 26, 2021 showing postsurgical changes in the right breast  but there was a new ill-defined spiculated mass in the left breast lower inner 7:00 region measuring about 2 cm on mammography and 1.4 x 1.7 x 1.2 cm on ultrasound in the 7:00 region 6 cm from the nipple.  There was also an enlarged left lower axillary lymph node measuring 4.4 x 1.3 cm.  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  On exam, at that time, I could not feel any suspicious densities in either breast even though close attention to the left breast 7:00 region. I reviewed her outside ultrasound from [facility] as well as her pathology and this was an intermediate grade invasive duct cancer which appeared localized on ultrasound.  There was an enlarged lymph node underneath the left axilla but this really appeared benign and just enlarged.  MRI performed on November 29, 2021 showed another suspicious linear area of nonmasslike enhancement in the central left breast as well as an area of non-mass-like enhancement in the right breast lower inner and central region measuring about 4.7 cm.  She underwent bilateral MRI guided core biopsies performed on December 9, 2021 and the left central region showed a sclerosed papillary lesion.  The right breast lower inner quadrant showed an intermediate grade invasive lobular cancer which was ER positive at 80%, WV positive at 40%, HER-2/merrill negative by IHC with a Ki-67 of 25%.  She was seen by Dr. Sangeeta Jones.  [facility] genetic panel testing was sent and was negative.  She understood the equivalent survival rates with lumpectomy and radiation versus mastectomy and  I spoke to her about the benefits of endocrine therapy given her hormone positive status as well.  Risk/benefits of proposed surgeries were also explained to the patient.  She decided to go forward with bilateral partial mastectomies and was seen by Dr. Patricio to undergo bilateral mastopexies at the same sitting.   She underwent localizations preoperatively and then underwent bilateral partial mastectomies with bilateral mastopexies and tissue rearrangement by Dr. Patricio with bilateral sentinel lymph nodes on January 21, 2022.  The final pathology on the left showed an invasive duct carcinoma with 4 separate satellite lesions with the main tumor measuring 2.7 cm and some surrounding DCIS but she had close margins on initial excision especially on the superior aspect but had a positive inferior margin on the further margin excisions but had 3 sentinel lymph nodes 2 with macrometastasis with some extranodal extension and the third sentinel node with a micrometastasis measuring 0.3 mm and a fourth nonsentinel lymph node which was negative making this a pathologic anatomic stage IIA but pathologic prognostic stage IA breast cancer (ER/WV positive greater than 95% and HER-2/merrill negative with a Ki-67 of 25%).  The separate left breast excision of the 12:00 sclerosing papilloma showed no residual lesion and was benign showing biopsy site changes.  The right breast partial mastectomy showed a classical invasive lobular cancer measuring 5 cm and she had 2 sentinel lymph nodes 1 with some isolated tumor cells in the initial wide excision showed a close medial margin but formal margins showed a positive inferior margin with a classical invasive lobular cancer making this a pathologic anatomic stage IIA but pathologic prognostic stage IA breast cancer (ER/WV positive at 99% and HER-2/merrill negative with a Ki-67 of 8%).  On exam today, she has an excellent cosmetic result and is healed well from the bilateral partial mastectomies with reduction mastopexy approach.  The patient has already seen Dr. Mccullough from radiation oncology and Dr. Jones.  She underwent Oncotype recurrence scores by Dr. Jones and the right lobular cancer had a recurrence score of 10 and the left node positive ductal cancer had a recurrence score of 17.  She was entered into the FLEX trial and MammaPrint as part of the FLEX trial showed a low risk luminal A subtype on both bilateral cancers.  I had a discussion with Dr. Jones and we have decided to move forward with surgery given the improved prognostic genomic profiles.  Due to the difficulty in determining the inferior margin as well as the multifocal nature is of her cancers, I recommended bilateral mastectomies and advised removal of both nipple areolar complexes and using the reduction mastopexy incisions.  She was seen Dr. Patricio in regards to reconstruction options.  She did have a second opinion down to [facility] who agreed with the axillary lymph node dissection but they felt that she could possibly just undergo radiation at this point but given the multifocal nature of this bilateral breast cancer I was uncomfortable with just radiation given the margin status.  She underwent the surgery with bilateral total mastectomies and left axillary lymph node dissection and she had bilateral prepectoral expander reconstructions with AlloDerm by Dr. Patricio performed on April 11, 2022.  The final pathology showed no further cancer in either mastectomy specimen and she had 9 negative left axillary lymph nodes however a separate lower inner quadrant anterior margin on the right side showed a 5 mm focus of invasive lobular cancer which focally extended to the anterior margin. On exam today, she is healing well and her skin flaps are viable and her drains are putting out too much to be removed today. The patient was advised to follow up with Dr. Sangeeta Jones to determine adjuvant treatment options.  She will follow up with Dr. Patricio from a cosmetic standpoint.  She should also follow-up with Dr. Mccullough to discuss postmastectomy radiation.  I would like to see her again in another 2 weeks for another wound evaluation.  I would like to refer her to physical therapy to prevent lymphedema given the fact that she underwent an axillary lymph node dissection and will require postmastectomy radiation over the left expander.

## 2022-04-20 ENCOUNTER — NON-APPOINTMENT (OUTPATIENT)
Age: 67
End: 2022-04-20

## 2022-05-02 ENCOUNTER — APPOINTMENT (OUTPATIENT)
Dept: RADIATION ONCOLOGY | Facility: CLINIC | Age: 67
End: 2022-05-02
Payer: MEDICARE

## 2022-05-02 PROCEDURE — 99215 OFFICE O/P EST HI 40 MIN: CPT

## 2022-05-02 RX ORDER — ATORVASTATIN CALCIUM 10 MG/1
10 TABLET, FILM COATED ORAL
Refills: 0 | Status: ACTIVE | COMMUNITY

## 2022-05-02 RX ORDER — AMOXICILLIN 500 MG/1
500 CAPSULE ORAL
Qty: 9 | Refills: 0 | Status: DISCONTINUED | COMMUNITY
Start: 2022-03-17 | End: 2022-05-02

## 2022-05-04 NOTE — PHYSICAL EXAM
[] : no respiratory distress [Supraclavicular Lymph Nodes Enlarged Bilaterally] : supraclavicular [Axillary Lymph Nodes Enlarged Bilaterally] : axillary [Normal] : oriented to person, place and time, the affect was normal, the mood was normal and not anxious [de-identified] : s/p bilateral mastectomies w/ reconstruction; well healed surgical incisions; no suspicious lesions

## 2022-05-04 NOTE — HISTORY OF PRESENT ILLNESS
[FreeTextEntry1] : Ms. Gayle is a 66 year old female referred here by Dr. Teixeira for management of bilateral breast cancer.\par \par On 3/13/19 she had a mammogram at [facility] and was found to have some architectural distortion in the 12:00 region of the right breast on mammography and ultrasound showed a 5 mm corresponding density.\par  \par On 3/13/19 she had an ultrasound guided right breast 12:00 core biopsy at [facility] which revealed some atypical duct hyperplasia. \par \par On 3/27/19 she underwent a wide excision lumpectomy with Dr. Teixeira at [facility] which revealed a radial sclerosing lesion but no further atypia. \par \par She continued her routine yearly follow-ups and yearly mammography and ultrasound. \par \par On 10/26/21 she underwent routine mammography and ultrasound at [facility], showing postsurgical changes in the right breast but there was a new ill-defined spiculated mass in the left breast lower inner 7:00 region measuring about 2 cm on mammography and 1.4 x 1.7 x 1.2 cm on ultrasound in the 7:00 region 6 cm from the nipple. There was also an enlarged left lower axillary lymph node measuring 4.4 x 1.3 cm. 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 \par \par On 11/29/21 she had a bilateral breast MRI at [facility] revealing biopsy proven malignancy in the lower slightly inner left breast measuring up to 2.1cm, with associated suspicious linear non mass enhancement extending anterior and inferior to the index mass measuring approximately 1.5cm. Linear nonmass enhancement in the central left breast. Suspicious nonmass enhancement in the lower inner and central inner right breast measuring up to 4.7 cm in AP dimension. \par \par On 12/9/21 she had an MRI biopsy, the left breast central middle area of non-mass-like enhancement showed a sclerosing papillary lesion. The right was positive for cancer, intermediate grade which was ER positive at 80%, RI positive at 40%, and HER-2/merrill negative by IHC.\par \par Dr. Teixeira stated this now bilateral breast cancer with ductal breast cancer in the left breast 7:00 region and this invasive lobular cancer in the right breast lower inner central region. He stated she could either go forward with a bilateral partial mastectomy and bilateral sentinel lymph node biopsy or could consider bilateral mastectomy in this case. \par \par On 1/21/22 she had both a a right partial mastectomy with six margin excisions and right axillary sentinel lymph node biopsies and left partial mastectomy with six margin excisions and left axillary sentinel lymph node biopsies done along with reconstruction from Dr. Patricio. \par \par FInal surgical pathology showed on the RIGHT: 2 tumors - partial mastectomy and inferior margin excision.\par Histologic Type: CLASSIC INVASIVE LOBULAR CARCINOMAS. Overall Grade: 2/3. No DCIS. No LVSI. Final Margins for Invasive Carcinoma: Uninvolved but <1 mm final inferior. Regional Lymph Node Status: Isolated tumor cells 1+/2 axillary nodes. No DURAN. Estrogen Receptor: 99% 3+(positive).  Progesterone Receptor: 99% 3+ (positive).  HER2: 0+ (negative). Ki67: 8% (low proliferation). Pathologic Stage Classification (AJCC 8th ed.): p T2 N0(i+)\par \par On the LEFT: Tumor Site: 7-7:30. Tumor Focality: Multifocal - main tumor and 4 tumor satellites. Histologic Type: INVASIVE DUCTAL CARCINOMAS. Tumor Size: Main tumor 27 mm and satellites 1 mm to 2 mm. Overall Grade: 2/3. Ductal Carcinoma In Situ: Present within and adjacent to invasive carcinoma. Extensive intraductal component: Negative. Size/extent of DCIS: 18 mm. No LVSI. Final Margins for Invasive Carcinoma: Uninvolved but <1 mm final inferior. Regional Lymph Node Status: 3+/4 axillary nodes.  Number of Lymph Nodes with Macrometastases: 2 nodes. Number of Lymph Nodes with Micrometastases: 1 node. Size of Largest Metastatic Deposit: 24 mm. Extranodal Extension: Present. Number of lymph nodes examined: 4 nodes.  Number of sentinel lymph nodes examined: 3 nodes. Estrogen Receptor: 95% 1-3+ (positive).  Progesterone Receptor: 99% 2-3+ (positive). HER2: 1+ (negative).\par  Ki67: 25% (high proliferation). Pathologic Stage Classification (AJCC 8th ed.): p T2 N1a.  \par \par She underwent Oncotype recurrence scores by Dr. Jones and the right lobular cancer had a recurrence score of 10 and the left node positive ductal cancer had a recurrence score of 17. She was entered into the FLEX trial and Mamma Print as part of the FLEX trial showed a low risk luminal A subtype on both bilateral cancers. \par \par The patient then had bilateral total mastectomies and left axillary lymph node dissection and she had bilateral prepectoral expander reconstructions with AlloDerm by Dr. Patricio performed on April 11, 2022. \par \par The final pathology showed no further cancer in either mastectomy specimen and she had 9 negative left axillary lymph nodes however a separate lower inner quadrant anterior margin on the right side showed a 5 mm focus of invasive lobular cancer which focally extended to the anterior margin. \par \par 5/2/22 Ms Gayle is here today to discuss post mastectomy radiation Therapy. She just had her surgical drains removed and is a little sore but otherwise doing well since surgery.

## 2022-05-04 NOTE — DISEASE MANAGEMENT
[Pathological] : TNM Stage: p [IB] : IB [FreeTextEntry4] : Staging of the left breast cancer; the patient has bilateral breast cancer; right side is [id number] [TTNM] : 2 [NTNM] : 1a [MTNM] : 0

## 2022-05-05 ENCOUNTER — APPOINTMENT (OUTPATIENT)
Dept: HEMATOLOGY ONCOLOGY | Facility: CLINIC | Age: 67
End: 2022-05-05
Payer: MEDICARE

## 2022-05-05 VITALS
SYSTOLIC BLOOD PRESSURE: 152 MMHG | HEIGHT: 67 IN | WEIGHT: 224 LBS | HEART RATE: 82 BPM | OXYGEN SATURATION: 98 % | BODY MASS INDEX: 35.16 KG/M2 | DIASTOLIC BLOOD PRESSURE: 86 MMHG | TEMPERATURE: 98 F | RESPIRATION RATE: 18 BRPM

## 2022-05-05 PROCEDURE — 99215 OFFICE O/P EST HI 40 MIN: CPT

## 2022-05-05 NOTE — HISTORY OF PRESENT ILLNESS
[de-identified] : This is a 66-year-old postmenopausal woman presenting with a new diagnosis of breast cancer\par Patient goes for regular routine screening mammograms and was initally  found to have architectural distortion at the right breast 12 o'clock position, ultrasound revealing for a 5 mm density she underwent a right breast  biopsy at [facility]  on 3/2019.  which showed atypical ductal hyperplasia and then underwent wide excision in March 2019 showing a radial sclerosing lesion but no further atypia or malignancy. Dr juarez. \par ? chemo prevention ? \par  Patient follows with Dr. Jessica Hi due to the high risk status and goes for regular mammo and MRI \par \par Routine screening mammogram on October 26, 2021 showed postsurgical changes right breast but there was a new ill-defined spiculated mass left breast 7 o'clock position measuring 2 cm there is also an enlarged left axillary lymph node measuring 4.4 x 1.3 cm ( this appeared benign  was not seen on  MRI) \par \par patient underwent a left breast ultrasound-guided core biopsy on November 5, 2021  which revealed an intermediate grade invasive  ductal carcinoma ER/NJ strongly +95 to 100% HER-2/merrill negative Ki-67 between 40 and 50%.\par \par MRI performed on November 29, 2021 revealing for a biopsy-proven malignancy in her left breast up to 2.1 cm with suspicious linear nonmasslike enhancement extending to the anterior inferior index mass total suspicious area of enhancement is 3.3 cm also linear non-mass enhancement left breast at the central portion of the breast MRI guided biopsy was recommended as well suspicious nonletter masslike enhancement in the inner right breast measuring 4.7 cm MRI guided biopsy was recommended.\par \par MRI guided biopsy performed on December 9, 2021 revealing for the following left breast non-mass-like enhancement showed usual ductal hyperplasia\par \par Right breast non-mass-like enhancement lower inner quadrant revealed grade 2 invasive lobular carcinoma grade 2 out of 3 invasive carcinoma was present in multiple cores up to 12 mm LCIS was also present and focal atypical ductal hyperplasia\par Tumor was ER +80% NJ +40% HER-2/merrill negative Ki-67 25%.\par \par Await genetic testing , monday.  9 genes\par Daughter is negative for BRCA \par \par Patient has extensive family history of cancer including a mother who had colon cancer a paternal grandmother who had breast cancer maternal cousin who had ovarian cancer and then breast cancer.  2 paternal cousins who had breast cancer her sister had a phyllodes \par \par Status post bilateral partial mastectomies with sentinel lymph node biopsy on January 21, 2022 with Dr. Jessica Hi.\par \par Right side-final pathological staging PT2N0(i) \par -Largest tumor 5 cm classic invasive lobular smaller tumor measuring 4 mm\par -Grade 2, no lymphovascular invasion\par -Margin less than 1 mm inferior\par -1 out of 2 lymph nodes revealing for isolated tumor cells\par -ER NJ positive both at 99% HER-2 negative Ki-67 8%\par \par Left side - final path T2N1a\par Largest tumor 2.7 cm with satellite lesions measuring 1 mm and 2 mm invasive ductal carcinoma\par -Grade 2 no lymphovascular invasion\par 2 lymph nodes positive for macro metastases measuring 2.4 cm with extranodal extension\par 1 lymph node positive for micro met for a total of 3 out of 4 lymph nodes positive\par ER/NJ +95% and 99%, HER-2 negative Ki-67 25%\par Margins were also close as well.\par \par Oncotype DX score on the left was low risk at 17 equating to a 15% risk of distant recurrence.\par Oncotype DX score on the right was low risk at 10 with a 3% risk of distant recurrence over the next 10 years.\par \par CT of the chest abdomen pelvis show hypoattenuation lesion in the spleen measuring 1.5 cm but no evidence of metastatic disease\par Bone scan negative for any evidence of metastatic disease.\par \par \par \par \par \par \par \par  [de-identified] : Patient had bilateral mastectomy with left axillary lymph node dissection, April 11, 2022 Dr. Teixeira \par \par Pathology on the right revealing for a classic invasive lobular carcinoma residual 5 mm of cancer.  There is also no evidence of additional\par \par Expanders are in place \par Met with rad onc \par MammaPrint was performed on both the left and right breast both were revealing for low risk luminal type a indicating no significant chemotherapy benefit\par \par Patient continues to have some soreness and numbness bilaterally in her chest wall\par No fevers or chills\par Energy is improving\par Drains were removed earlier this week\par Sleeping a little bit better\par ,

## 2022-05-05 NOTE — ASSESSMENT
[FreeTextEntry1] : This is a 66-year-old postmenopausal woman presenting with bilateral breast cancer.\par Routine screening mammogram revealing for new abnormality on the left biopsy revealing for invasive ductal carcinoma ER/CO strongly positive HER-2/merrill negative.  Initially there was an enlarged lymph node but this was thought to be negative due to appearance and the lack of visualization on MRI.  Then on MRI in November 2021 patient was found to have a contralateral breast mass on the right measuring 4.7 cm biopsy was revealing for invasive lobular carcinoma grade 2 ER/CO positive HER-2/merrill negative.  She is now planned for bilateral lumpectomies.\par \par 1) bilateral breast cancer \par - the right is in T2N 0 (i) with a 5 cm invasive lobular carcinoma and 2 lymph nodes positive for isolated tumor cells.  ER/CO strongly positive at 99% respectively HER-2 negative Ki-67 8%, grade 2 , no LVI \par -The left side is positive for a 2.7 cm invasive ductal carcinoma grade 2 no lymphovascular invasion  2 macrometastases to the lymph node and 1 micrometastases for a grand total of 3 out of 4 this tumor is also ER/CO positive strongly at 95 and 99% respectively but the Ki-67 is higher at 25%\par \par -Oncotype DX score was reviewed today at length with the patient and her daughter on the left the score is low at 17 which equates to a 15% risk of distant recurrence in the next 10 years on the right the scores also low risk at 10 with a 3% risk of distant recurrence over the next 10 years.\par -Because both Oncotype's are low risk this would indicate that based on the molecular biology there would be no significant benefit to giving chemotherapy to reduce the risk of recurrence.  However in looking at the clinical risks of both cancers the left with the high Ki-67 and the 3+ lymph nodes clinically the patient is high risk.\par -Reviewed with patient that both MammaPrint sent on both the right and left cancers results also came back low risk a consistent with a lack of chemotherapy benefit.\par -Explained the difference between molecular phenotype low risk score and how these both can be incorporated.\par -Reviewed the final pathology from the bilateral mastectomy which shows residual cancer on the right and on the left there are no additional positive lymph nodes.  Therefore the final staging as was determined by the lumpectomy specimens is unchanged.\par -Again reviewed that in order for the molecular studies to be valid especially the Oncotype have to consider the Rxsponder trial and the enrollment criteria which included 3 or less positive lymph nodes.\par -Because the additional lymph node dissection did not show any additional positive lymph nodes the patient falls within this criteria.\par However I did explain that given the 2 primaries and especially given that the right side was over 5 cm she would considered a higher risk side clinically especially given that the left sided breast cancer was lymph node positive.\par 1 could consider giving chemotherapy in this situation however given that both Oncotype and MammaPrint would argue that there is no benefit to chemotherapy based on the molecular phenotype this must be taken into consideration.  Prior to molecular studies it would have been standard to give chemotherapy however now we know based on the molecular phenotype that chemotherapy may not be beneficial in this setting and that hormone therapy may be more beneficial.  In addition now we have the option of giving a CK4 6 inhibitor in the adjuvant setting for high risk patients that I believe this patient would fall into given the high Ki-67 and the large size and multiple positive lymph nodes.\par -we mututally deceided to forgo chemotherapy and proceed with endocrine therapy \par -We discussed hormonal therapy at length including the mechanism efficacy and side effects.  We decided to go with letrozole, discussed the side effects of joint pains hot flashes and bone thinning.\par -In addition we discussed Verzenio, and the [place] data.  We discussed getting some efficacy and side effects including the risk of cytopenias, GI side effects, fibrosis.  \par - ct and bone scan negative for mets, discussed cerianna petct but unable to perform due to lack of insurance covering \par -will follow up scans in about 3 months to follow up splenic lesions \par -proceed with radiation \par -proceed with letrozole \par -plan for possible  verzenio after rt \par \par 2) vit d \par cont vit d \par \par 3) osteopenia \par check dexa \par \par \par 4 ) anxiety \par xanax as needed \par emotional support given \par \par Dw patient at length time spent over 45 mins \par follow up after RT \par \par \par

## 2022-05-07 LAB
CANCER AG15-3 SERPL-ACNC: 25.9 U/ML
CEA SERPL-MCNC: 2 NG/ML

## 2022-06-07 ENCOUNTER — NON-APPOINTMENT (OUTPATIENT)
Age: 67
End: 2022-06-07

## 2022-06-07 VITALS
SYSTOLIC BLOOD PRESSURE: 115 MMHG | RESPIRATION RATE: 18 BRPM | DIASTOLIC BLOOD PRESSURE: 79 MMHG | HEART RATE: 74 BPM | BODY MASS INDEX: 35.26 KG/M2 | WEIGHT: 225.1 LBS

## 2022-06-07 NOTE — REVIEW OF SYSTEMS
[Pruritus: Grade 0] : Pruritus: Grade 0 [Skin Hyperpigmentation: Grade 0] : Skin Hyperpigmentation: Grade 0 [Dermatitis Radiation: Grade 0] : Dermatitis Radiation: Grade 0 [Negative] : Allergic/Immunologic

## 2022-06-07 NOTE — DISEASE MANAGEMENT
[Pathological] : TNM Stage: p [IB] : IB [FreeTextEntry4] : Staging of the left breast cancer; the patient has bilateral breast cancer; right side is [id number] [TTNM] : 2 [NTNM] : 1a [MTNM] : 0 [de-identified] : chest wall and nodes FX 6/25 1200cGy

## 2022-06-07 NOTE — HISTORY OF PRESENT ILLNESS
[FreeTextEntry1] : 6/7/22 Pt here for routine on Tx vist.  She is feeling well and has no skin issues.  She is using calendula twice per day as recommended.  Hydration nutrition and skin care were reinforced.

## 2022-06-07 NOTE — PHYSICAL EXAM
[Normal] : oriented to person, place and time, the affect was normal, the mood was normal and not anxious [de-identified] : No erythema seen over chest wall

## 2022-06-14 ENCOUNTER — NON-APPOINTMENT (OUTPATIENT)
Age: 67
End: 2022-06-14

## 2022-06-14 VITALS
HEART RATE: 66 BPM | DIASTOLIC BLOOD PRESSURE: 89 MMHG | OXYGEN SATURATION: 97 % | BODY MASS INDEX: 34.21 KG/M2 | SYSTOLIC BLOOD PRESSURE: 150 MMHG | TEMPERATURE: 97.5 F | RESPIRATION RATE: 17 BRPM | WEIGHT: 218 LBS | HEIGHT: 67 IN

## 2022-06-15 NOTE — HISTORY OF PRESENT ILLNESS
[FreeTextEntry1] : 6/7/22 Pt here for routine on Tx vist.  She is feeling well and has no skin issues.  She is using calendula twice per day as recommended.  Hydration nutrition and skin care were reinforced.  \par \par 6/14/2022\par Patient competed fractions 11/25, total dose of 2400 cGy delivered to her Left chest wall. She is feeling well and has no skin issues.  She is using calendula twice per day as recommended.  Hydration nutrition and skin care were reinforced.  C/o dysphagia.

## 2022-06-15 NOTE — VITALS
[Least Pain Intensity: 0/10] : 0/10 [100: Normal, no complaints, no evidence of disease.] : 100: Normal, no complaints, no evidence of disease. [Maximal Pain Intensity: 3/10] : 3/10 [Pain Location: ___] : Pain Location: [unfilled] [OTC] : OTC

## 2022-06-15 NOTE — REVIEW OF SYSTEMS
[Fatigue: Grade 1 - Fatigue relieved by rest] : Fatigue: Grade 1 - Fatigue relieved by rest [Breast Pain: Grade 0] : Breast Pain: Grade 0 [Pruritus: Grade 0] : Pruritus: Grade 0 [Skin Hyperpigmentation: Grade 0] : Skin Hyperpigmentation: Grade 0 [Dermatitis Radiation: Grade 0] : Dermatitis Radiation: Grade 0 [Dysphagia: Grade 1 - Symptomatic, able to eat regular diet] : Dysphagia: Grade 1 - Symptomatic, able to eat regular diet

## 2022-06-15 NOTE — DISEASE MANAGEMENT
[Pathological] : TNM Stage: p [IB] : IB [FreeTextEntry4] : Staging of the left breast cancer; the patient has bilateral breast cancer; right side is [id number] [TTNM] : 2 [NTNM] : 1a [MTNM] : 0 [de-identified] : Patient competed fractions 11/25, total dose of 2400 cGy delivered to her Left chest wall.

## 2022-06-15 NOTE — PHYSICAL EXAM
[] : no respiratory distress [Normal] : oriented to person, place and time, the affect was normal, the mood was normal and not anxious [de-identified] : No thrush [de-identified] : No skin changes within the radiated field left CW/reconstructed breast

## 2022-06-20 ENCOUNTER — NON-APPOINTMENT (OUTPATIENT)
Age: 67
End: 2022-06-20

## 2022-06-20 VITALS
SYSTOLIC BLOOD PRESSURE: 147 MMHG | WEIGHT: 215 LBS | BODY MASS INDEX: 33.74 KG/M2 | TEMPERATURE: 97.6 F | HEIGHT: 67 IN | HEART RATE: 68 BPM | OXYGEN SATURATION: 100 % | RESPIRATION RATE: 17 BRPM | DIASTOLIC BLOOD PRESSURE: 93 MMHG

## 2022-06-20 NOTE — PHYSICAL EXAM
[] : no respiratory distress [Normal] : oriented to person, place and time, the affect was normal, the mood was normal and not anxious [de-identified] : Mild erythema with scaly rash appearing at the superior and medial aspect of the reconstructed left breast

## 2022-06-20 NOTE — REVIEW OF SYSTEMS
[Dysphagia: Grade 1 - Symptomatic, able to eat regular diet] : Dysphagia: Grade 1 - Symptomatic, able to eat regular diet [Fatigue: Grade 0] : Fatigue: Grade 0 [Breast Pain: Grade 0] : Breast Pain: Grade 0 [Pruritus: Grade 1 - Mild or localized; topical intervention indicated] : Pruritus: Grade 1 - Mild or localized; topical intervention indicated [Skin Hyperpigmentation: Grade 1 - Hyperpigmentation covering <10% BSA; no psychosocial impact] : Skin Hyperpigmentation: Grade 1 - Hyperpigmentation covering <10% BSA; no psychosocial impact [Dermatitis Radiation: Grade 0] : Dermatitis Radiation: Grade 0

## 2022-06-20 NOTE — DISEASE MANAGEMENT
[Pathological] : TNM Stage: p [IB] : IB [FreeTextEntry4] : Staging of the left breast cancer; the patient has bilateral breast cancer; right side is [id number] [TTNM] : 2 [NTNM] : 1a [MTNM] : 0 [de-identified] : Patient competed fractions 15/25, total dose of 3000 cGy delivered to her Left chest wall.

## 2022-06-20 NOTE — VITALS
[Maximal Pain Intensity: 2/10] : 2/10 [Least Pain Intensity: 1/10] : 1/10 [NoTreatment Scheduled] : no treatment scheduled [90: Able to carry normal activity; minor signs or symptoms of disease.] : 90: Able to carry normal activity; minor signs or symptoms of disease.

## 2022-06-20 NOTE — HISTORY OF PRESENT ILLNESS
[FreeTextEntry1] : 6/7/22 Pt here for routine on Tx vist.  She is feeling well and has no skin issues.  She is using calendula twice per day as recommended.  Hydration nutrition and skin care were reinforced.  \par \par 6/14/2022\par Patient competed fractions 11/25, total dose of 2400 cGy delivered to her Left chest wall. She is feeling well and has no skin issues.  She is using calendula twice per day as recommended.  Hydration nutrition and skin care were reinforced.  C/o dysphagia.\par \par 6/20/2022\par Patient competed fractions 15/25, total dose of 3000 cGy delivered to her Left chest wall. Patient stated having pruritus for one week. c/o sore throat, used prescribed magic mouth wash only once due to the taste. Denies edema, or fatigue.

## 2022-06-27 ENCOUNTER — NON-APPOINTMENT (OUTPATIENT)
Age: 67
End: 2022-06-27

## 2022-06-27 VITALS
HEART RATE: 67 BPM | OXYGEN SATURATION: 100 % | RESPIRATION RATE: 18 BRPM | DIASTOLIC BLOOD PRESSURE: 95 MMHG | SYSTOLIC BLOOD PRESSURE: 162 MMHG | BODY MASS INDEX: 34.21 KG/M2 | HEIGHT: 67 IN | WEIGHT: 218 LBS

## 2022-06-27 NOTE — PHYSICAL EXAM
[] : no respiratory distress [Normal] : oriented to person, place and time, the affect was normal, the mood was normal and not anxious [de-identified] : Mild-moderate erythema left breast/SCLV region with papular rash medially; skin is intact

## 2022-06-27 NOTE — VITALS
[Maximal Pain Intensity: 3/10] : 3/10 [Least Pain Intensity: 1/10] : 1/10 [NoTreatment Scheduled] : no treatment scheduled [90: Able to carry normal activity; minor signs or symptoms of disease.] : 90: Able to carry normal activity; minor signs or symptoms of disease.

## 2022-06-27 NOTE — HISTORY OF PRESENT ILLNESS
[FreeTextEntry1] : 6/7/22 Pt here for routine on Tx vist.  She is feeling well and has no skin issues.  She is using calendula twice per day as recommended.  Hydration nutrition and skin care were reinforced.  \par \par 6/14/2022\par Patient competed fractions 11/25, total dose of 2400 cGy delivered to her Left chest wall. She is feeling well and has no skin issues.  She is using calendula twice per day as recommended.  Hydration nutrition and skin care were reinforced.  C/o dysphagia.\par \par 6/20/2022\par Patient competed fractions 15/25, total dose of 3000 cGy delivered to her Left chest wall. Patient stated having pruritus for one week. c/o sore throat, used prescribed magic mouth wash only once due to the taste. Denies edema, or fatigue. \par \par 6/22/2022\par Patient competed fractions 20/25, total dose of 4000 cGy delivered to her Left chest wall. Patient stated having left chest wall itchiness, tightness, and soreness. She is using Aquaphor BID.

## 2022-06-27 NOTE — REVIEW OF SYSTEMS
[Dysphagia: Grade 1 - Symptomatic, able to eat regular diet] : Dysphagia: Grade 1 - Symptomatic, able to eat regular diet [Fatigue: Grade 0] : Fatigue: Grade 0 [Breast Pain: Grade 0] : Breast Pain: Grade 0 [Pruritus: Grade 1 - Mild or localized; topical intervention indicated] : Pruritus: Grade 1 - Mild or localized; topical intervention indicated [Skin Hyperpigmentation: Grade 1 - Hyperpigmentation covering <10% BSA; no psychosocial impact] : Skin Hyperpigmentation: Grade 1 - Hyperpigmentation covering <10% BSA; no psychosocial impact [Dermatitis Radiation: Grade 1 - Faint erythema or dry desquamation] : Dermatitis Radiation: Grade 1 - Faint erythema or dry desquamation

## 2022-07-05 ENCOUNTER — NON-APPOINTMENT (OUTPATIENT)
Age: 67
End: 2022-07-05

## 2022-07-05 VITALS
OXYGEN SATURATION: 100 % | BODY MASS INDEX: 34.37 KG/M2 | TEMPERATURE: 97.5 F | RESPIRATION RATE: 18 BRPM | HEIGHT: 67 IN | WEIGHT: 219 LBS | DIASTOLIC BLOOD PRESSURE: 88 MMHG | HEART RATE: 69 BPM | SYSTOLIC BLOOD PRESSURE: 148 MMHG

## 2022-07-05 NOTE — DISEASE MANAGEMENT
[Pathological] : TNM Stage: p [IB] : IB [FreeTextEntry4] : Staging of the left breast cancer; the patient has bilateral breast cancer; right side is [id number] [TTNM] : 2 [NTNM] : 1a [MTNM] : 0 [de-identified] : Patient competed fractions 25/25, total dose of 5000 cGy delivered to her Left chest wall.

## 2022-07-05 NOTE — HISTORY OF PRESENT ILLNESS
[FreeTextEntry1] : 6/7/22 Pt here for routine on Tx vist.  She is feeling well and has no skin issues.  She is using calendula twice per day as recommended.  Hydration nutrition and skin care were reinforced.  \par \par 6/14/2022\par Patient competed fractions 11/25, total dose of 2400 cGy delivered to her Left chest wall. She is feeling well and has no skin issues.  She is using calendula twice per day as recommended.  Hydration nutrition and skin care were reinforced.  C/o dysphagia.\par \par 6/20/2022\par Patient competed fractions 15/25, total dose of 3000 cGy delivered to her Left chest wall. Patient stated having pruritus for one week. c/o sore throat, used prescribed magic mouth wash only once due to the taste. Denies edema, or fatigue. \par \par 6/22/2022\par Patient competed fractions 20/25, total dose of 4000 cGy delivered to her Left chest wall. Patient stated having left chest wall itchiness, tightness, and soreness. She is using Aquaphor BID. \par \par 7/5/2022\par Patient competed fractions 25/25, total dose of 5000 cGy delivered to her Left chest wall. Patient stated lots of redness and discoloration with irritation on her left chest wall. Xeroform was provided. Skin care education reinforced.  She is using Aquaphor BID.

## 2022-07-05 NOTE — REVIEW OF SYSTEMS
[Dysphagia: Grade 1 - Symptomatic, able to eat regular diet] : Dysphagia: Grade 1 - Symptomatic, able to eat regular diet [Breast Pain: Grade 0] : Breast Pain: Grade 0 [Pruritus: Grade 1 - Mild or localized; topical intervention indicated] : Pruritus: Grade 1 - Mild or localized; topical intervention indicated [Skin Hyperpigmentation: Grade 1 - Hyperpigmentation covering <10% BSA; no psychosocial impact] : Skin Hyperpigmentation: Grade 1 - Hyperpigmentation covering <10% BSA; no psychosocial impact [Dermatitis Radiation: Grade 1 - Faint erythema or dry desquamation] : Dermatitis Radiation: Grade 1 - Faint erythema or dry desquamation [Fatigue: Grade 1 - Fatigue relieved by rest] : Fatigue: Grade 1 - Fatigue relieved by rest

## 2022-07-05 NOTE — PHYSICAL EXAM
[] : no respiratory distress [Normal] : oriented to person, place and time, the affect was normal, the mood was normal and not anxious [de-identified] : Moderate erythema left chest wall; dry desquamation

## 2022-07-27 ENCOUNTER — RESULT REVIEW (OUTPATIENT)
Age: 67
End: 2022-07-27

## 2022-07-27 ENCOUNTER — APPOINTMENT (OUTPATIENT)
Dept: HEMATOLOGY ONCOLOGY | Facility: CLINIC | Age: 67
End: 2022-07-27

## 2022-07-27 VITALS
HEART RATE: 62 BPM | TEMPERATURE: 98.1 F | WEIGHT: 219 LBS | SYSTOLIC BLOOD PRESSURE: 166 MMHG | OXYGEN SATURATION: 99 % | HEIGHT: 67 IN | RESPIRATION RATE: 18 BRPM | BODY MASS INDEX: 34.37 KG/M2 | DIASTOLIC BLOOD PRESSURE: 87 MMHG

## 2022-07-27 PROCEDURE — 36415 COLL VENOUS BLD VENIPUNCTURE: CPT

## 2022-07-27 PROCEDURE — 99214 OFFICE O/P EST MOD 30 MIN: CPT | Mod: 25

## 2022-07-27 RX ORDER — LIDOCAINE HYDROCHLORIDE 20 MG/ML
2 SOLUTION ORAL; TOPICAL
Qty: 100 | Refills: 0 | Status: COMPLETED | COMMUNITY
Start: 2022-06-15 | End: 2022-07-27

## 2022-07-27 RX ORDER — NYSTATIN 100000 [USP'U]/ML
100000 SUSPENSION ORAL
Qty: 100 | Refills: 0 | Status: COMPLETED | COMMUNITY
Start: 2022-06-15 | End: 2022-07-27

## 2022-07-27 RX ORDER — MAG HYDROX/ALUMINUM HYD/SIMETH 400-400-40
400-400-40 SUSPENSION, ORAL (FINAL DOSE FORM) ORAL
Qty: 1 | Refills: 0 | Status: COMPLETED | COMMUNITY
Start: 2022-06-15 | End: 2022-07-27

## 2022-07-27 NOTE — ASSESSMENT
[FreeTextEntry1] : This is a 66-year-old postmenopausal woman presenting with bilateral breast cancer.\par Routine screening mammogram revealing for new abnormality on the left biopsy revealing for invasive ductal carcinoma ER/MO strongly positive HER-2/merrill negative.  Initially there was an enlarged lymph node but this was thought to be negative due to appearance and the lack of visualization on MRI.  Then on MRI in November 2021 patient was found to have a contralateral breast mass on the right measuring 4.7 cm biopsy was revealing for invasive lobular carcinoma grade 2 ER/MO positive HER-2/merrill negative.  She is now planned for bilateral lumpectomies.\par \par 1) bilateral breast cancer \par - the right is in T2N 0 (i) with a 5 cm invasive lobular carcinoma and 2 lymph nodes positive for isolated tumor cells.  ER/MO strongly positive at 99% respectively HER-2 negative Ki-67 8%, grade 2 , no LVI \par -The left side is positive T2N1a for a 2.7 cm invasive ductal carcinoma grade 2 no lymphovascular invasion  2 macrometastases to the lymph node and 1 micrometastases for a grand total of 3 out of 4 this tumor is also ER/MO positive strongly at 95 and 99% respectively but the Ki-67 is higher at 25%\par \par -Oncotype DX score was reviewed today at length with the patient and her daughter on the left the score is low at 17 which equates to a 15% risk of distant recurrence in the next 10 years on the right the scores also low risk at 10 with a 3% risk of distant recurrence over the next 10 years.\par -Because both Oncotype's are low risk this would indicate that based on the molecular biology there would be no significant benefit to giving chemotherapy to reduce the risk of recurrence.  However in looking at the clinical risks of both cancers the left with the high Ki-67 and the 3+ lymph nodes clinically the patient is high risk.\par -Reviewed with patient that both MammaPrint sent on both the right and left cancers results also came back low risk a consistent with a lack of chemotherapy benefit.\par -Explained the difference between molecular phenotype low risk score and how these both can be incorporated.\par -Reviewed the final pathology from the bilateral mastectomy which shows residual cancer on the right and on the left there are no additional positive lymph nodes.  Therefore the final staging as was determined by the lumpectomy specimens is unchanged.\par -Again reviewed that in order for the molecular studies to be valid especially the Oncotype have to consider the Rxsponder trial and the enrollment criteria which included 3 or less positive lymph nodes.\par -Because the additional lymph node dissection did not show any additional positive lymph nodes the patient falls within this criteria.\par However I did explain that given the 2 primaries and especially given that the right side was over 5 cm she would considered a higher risk side clinically especially given that the left sided breast cancer was lymph node positive.\par 1 could consider giving chemotherapy in this situation however given that both Oncotype and MammaPrint would argue that there is no benefit to chemotherapy based on the molecular phenotype this must be taken into consideration.  Prior to molecular studies it would have been standard to give chemotherapy however now we know based on the molecular phenotype that chemotherapy may not be beneficial in this setting and that hormone therapy may be more beneficial.  In addition now we have the option of giving a CK4 6 inhibitor in the adjuvant setting for high risk patients that I believe this patient would fall into given the high Ki-67 and the large size and multiple positive lymph nodes.\par -we mutually decided to forgo chemotherapy and proceed with endocrine therapy \par -We discussed hormonal therapy at length including the mechanism efficacy and side effects.  We decided to go with letrozole, discussed the side effects of joint pains hot flashes and bone thinning.\par -In addition we discussed Verzenio, and the [place] E data.  We discussed getting some efficacy and side effects including the risk of cytopenias, GI side effects, fibrosis.  \par - ct and bone scan negative for mets\par -will follow up scans in about 3 months to follow up splenic lesions \par -s/p radiation \par -started letrozole \par -Verzenio- rx send, side effects reviewed with patient \par \par # vit d \par cont vit d \par \par # osteopenia \par check dexa \par \par #anxiety \par xanax as needed \par emotional support given \par \par Dw patient at length time spent over 45 mins \par follow up after RT \par \par \par

## 2022-07-27 NOTE — HISTORY OF PRESENT ILLNESS
[de-identified] : This is a 66-year-old postmenopausal woman presenting with a new diagnosis of breast cancer\par Patient goes for regular routine screening mammograms and was initally  found to have architectural distortion at the right breast 12 o'clock position, ultrasound revealing for a 5 mm density she underwent a right breast  biopsy at [facility]  on 3/2019.  which showed atypical ductal hyperplasia and then underwent wide excision in March 2019 showing a radial sclerosing lesion but no further atypia or malignancy. Dr juarez. \par ? chemo prevention ? \par  Patient follows with Dr. Jessica Hi due to the high risk status and goes for regular mammo and MRI \par \par Routine screening mammogram on October 26, 2021 showed postsurgical changes right breast but there was a new ill-defined spiculated mass left breast 7 o'clock position measuring 2 cm there is also an enlarged left axillary lymph node measuring 4.4 x 1.3 cm ( this appeared benign  was not seen on  MRI) \par \par patient underwent a left breast ultrasound-guided core biopsy on November 5, 2021  which revealed an intermediate grade invasive  ductal carcinoma ER/ND strongly +95 to 100% HER-2/merrill negative Ki-67 between 40 and 50%.\par \par MRI performed on November 29, 2021 revealing for a biopsy-proven malignancy in her left breast up to 2.1 cm with suspicious linear nonmasslike enhancement extending to the anterior inferior index mass total suspicious area of enhancement is 3.3 cm also linear non-mass enhancement left breast at the central portion of the breast MRI guided biopsy was recommended as well suspicious nonletter masslike enhancement in the inner right breast measuring 4.7 cm MRI guided biopsy was recommended.\par \par MRI guided biopsy performed on December 9, 2021 revealing for the following left breast non-mass-like enhancement showed usual ductal hyperplasia\par \par Right breast non-mass-like enhancement lower inner quadrant revealed grade 2 invasive lobular carcinoma grade 2 out of 3 invasive carcinoma was present in multiple cores up to 12 mm LCIS was also present and focal atypical ductal hyperplasia\par Tumor was ER +80% ND +40% HER-2/merrill negative Ki-67 25%.\par \par Await genetic testing , monday.  9 genes\par Daughter is negative for BRCA \par \par Patient has extensive family history of cancer including a mother who had colon cancer a paternal grandmother who had breast cancer maternal cousin who had ovarian cancer and then breast cancer.  2 paternal cousins who had breast cancer her sister had a phyllodes \par \par Status post bilateral partial mastectomies with sentinel lymph node biopsy on January 21, 2022 with Dr. Jessica Hi.\par \par Right side-final pathological staging PT2N0(i) \par -Largest tumor 5 cm classic invasive lobular smaller tumor measuring 4 mm\par -Grade 2, no lymphovascular invasion\par -Margin less than 1 mm inferior\par -1 out of 2 lymph nodes revealing for isolated tumor cells\par -ER ND positive both at 99% HER-2 negative Ki-67 8%\par \par Left side - final path T2N1a\par Largest tumor 2.7 cm with satellite lesions measuring 1 mm and 2 mm invasive ductal carcinoma\par -Grade 2 no lymphovascular invasion\par 2 lymph nodes positive for macro metastases measuring 2.4 cm with extranodal extension\par 1 lymph node positive for micro met for a total of 3 out of 4 lymph nodes positive\par ER/ND +95% and 99%, HER-2 negative Ki-67 25%\par Margins were also close as well.\par \par Oncotype DX score on the left was low risk at 17 equating to a 15% risk of distant recurrence.\par Oncotype DX score on the right was low risk at 10 with a 3% risk of distant recurrence over the next 10 years.\par \par CT of the chest abdomen pelvis show hypoattenuation lesion in the spleen measuring 1.5 cm but no evidence of metastatic disease\par Bone scan negative for any evidence of metastatic disease.\par \par \par \par \par \par \par \par  [de-identified] : Patient had bilateral mastectomy with left axillary lymph node dissection, April 11, 2022 Dr. Teixeira.  Patient finished radiation three weeks ago.  Feeling OK but she has acid reflux, fatigue and sweating.   She states that she is sleeping poorly, she takes melatonin with little relief.    \par \par MammaPrint was performed on both the left and right breast both were revealing for low risk luminal type a indicating no significant chemotherapy benefit

## 2022-07-28 LAB
25(OH)D3 SERPL-MCNC: 48 NG/ML
CANCER AG15-3 SERPL-ACNC: 30.9 U/ML
CEA SERPL-MCNC: 3.6 NG/ML
FERRITIN SERPL-MCNC: 165 NG/ML
FOLATE SERPL-MCNC: 13.5 NG/ML
IRON SATN MFR SERPL: 38 %
IRON SERPL-MCNC: 134 UG/DL
TIBC SERPL-MCNC: 354 UG/DL
UIBC SERPL-MCNC: 220 UG/DL
VIT B12 SERPL-MCNC: 513 PG/ML

## 2022-08-09 ENCOUNTER — APPOINTMENT (OUTPATIENT)
Dept: RADIATION ONCOLOGY | Facility: CLINIC | Age: 67
End: 2022-08-09

## 2022-08-09 VITALS
WEIGHT: 218 LBS | DIASTOLIC BLOOD PRESSURE: 82 MMHG | BODY MASS INDEX: 34.14 KG/M2 | SYSTOLIC BLOOD PRESSURE: 132 MMHG | HEART RATE: 61 BPM | RESPIRATION RATE: 18 BRPM | OXYGEN SATURATION: 99 %

## 2022-08-09 PROCEDURE — 99024 POSTOP FOLLOW-UP VISIT: CPT

## 2022-08-09 NOTE — PHYSICAL EXAM
[] : no respiratory distress [Normal] : oriented to person, place and time, the affect was normal, the mood was normal and not anxious [de-identified] : No skin changes within the radiation field left CW and regional nodes [de-identified] : Patient is wearing a lymphedema sleeve left arm

## 2022-08-09 NOTE — DISEASE MANAGEMENT
[Pathological] : TNM Stage: p [IB] : IB [FreeTextEntry4] : Staging of the left breast cancer; the patient has bilateral breast cancer; right side is pT2N0 [TTNM] : 2 [NTNM] : 1a [MTNM] : 0

## 2022-08-09 NOTE — HISTORY OF PRESENT ILLNESS
[FreeTextEntry1] : Ms. Gayle is a 66 year old female with bilateral breast cancer s/p PMRT to the left reconstructed breast and regional nodes.\par \par 25/25 Fxs 5000 cGY completed on 7/5/2022. ( Did not get cheotherapy mammaprint was low risk luminal type with no benefit) \par \par She saw Dr Bryant on July 27th who recommended starting Verzenio 150mg bid and continue Letrozole 2.5mg \par \par Today 8/9/2022 she is here for PTE.  She is feeling better now and her skin has healed nicely.  She saw Dr Patricio this week and he will start filling the expanders in September.  She is having some indigestion and she is taking Tums for this.

## 2022-08-09 NOTE — REVIEW OF SYSTEMS
[Edema Limbs: Grade 0] : Edema Limbs: Grade 0  [Breast Pain: Grade 0] : Breast Pain: Grade 0 [Pruritus: Grade 0] : Pruritus: Grade 0 [Skin Hyperpigmentation: Grade 0] : Skin Hyperpigmentation: Grade 0 [Dermatitis Radiation: Grade 0] : Dermatitis Radiation: Grade 0 [FreeTextEntry7] : indigestion [Dyspepsia: Grade 1 - Mild symptoms; intervention not indicated] : Dyspepsia: Grade 1 - Mild symptoms; intervention not indicated [Fatigue: Grade 1 - Fatigue relieved by rest] : Fatigue: Grade 1 - Fatigue relieved by rest

## 2022-08-22 ENCOUNTER — APPOINTMENT (OUTPATIENT)
Dept: BREAST CENTER | Facility: CLINIC | Age: 67
End: 2022-08-22

## 2022-08-22 VITALS
SYSTOLIC BLOOD PRESSURE: 149 MMHG | WEIGHT: 218 LBS | BODY MASS INDEX: 34.14 KG/M2 | OXYGEN SATURATION: 97 % | HEART RATE: 70 BPM | DIASTOLIC BLOOD PRESSURE: 93 MMHG

## 2022-08-22 PROCEDURE — 99213 OFFICE O/P EST LOW 20 MIN: CPT

## 2022-08-22 NOTE — ASSESSMENT
[FreeTextEntry1] : The patient is a 67-year-old G3, P3 postmenopausal white female.  She underwent menarche at age 11 and had her first child at age 24.  She underwent menopause at age 54 and never took any hormone replacement therapy.  She has a family history with her mother who had colon cancer and passed away at age 72.  Her paternal grandmother passed away from breast cancer in her 60s.  A maternal cousin had ovarian cancer at age 55 and then breast cancer at age 63 and tested BRCA negative.  2 paternal cousins had breast cancer at ages 45 and age 50.  Both tested BRCA negative.  Her sister had a phyllodes tumor excised.  I take care of both her sisters, Racheal Stevenson and Mrs. Dunn.  She was found to have some architectural distortion in the 12:00 region of the right breast on mammography and ultrasound showed a 5 mm corresponding density.  Ultrasound-guided right breast 12:00 core biopsy at [facility] showed some atypical duct hyperplasia and she underwent a wide excision on March 27, 2019 showing a radial sclerosing lesion but no further atypia.  She has a Caren model lifetime risk of 15.7% and 5-year risk of 4.1%.  She underwent routine mammography and ultrasound at [facility] on October 26, 2021 showing postsurgical changes in the right breast  but there was a new ill-defined spiculated mass in the left breast lower inner 7:00 region measuring about 2 cm on mammography and 1.4 x 1.7 x 1.2 cm on ultrasound in the 7:00 region 6 cm from the nipple.  There was also an enlarged left lower axillary lymph node measuring 4.4 x 1.3 cm.  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  On exam, at that time, I could not feel any suspicious densities in either breast even though close attention to the left breast 7:00 region. I reviewed her outside ultrasound from [facility] as well as her pathology and this was an intermediate grade invasive duct cancer which appeared localized on ultrasound.  There was an enlarged lymph node underneath the left axilla but this really appeared benign and just enlarged.  MRI performed on November 29, 2021 showed another suspicious linear area of nonmasslike enhancement in the central left breast as well as an area of non-mass-like enhancement in the right breast lower inner and central region measuring about 4.7 cm.  She underwent bilateral MRI guided core biopsies performed on December 9, 2021 and the left central region showed a sclerosed papillary lesion.  The right breast lower inner quadrant showed an intermediate grade invasive lobular cancer which was ER positive at 80%, NC positive at 40%, HER-2/merrill negative by IHC with a Ki-67 of 25%.  She was seen by Dr. Sangeeat Jones.  Inv[employer]ae genetic panel testing was sent and was negative.  She understood the equivalent survival rates with lumpectomy and radiation versus mastectomy and  I spoke to her about the benefits of endocrine therapy given her hormone positive status as well.  Risk/benefits of proposed surgeries were also explained to the patient.  She decided to go forward with bilateral partial mastectomies and was seen by Dr. Patricio to undergo bilateral mastopexies at the same sitting.   She underwent localizations preoperatively and then underwent bilateral partial mastectomies with bilateral mastopexies and tissue rearrangement by Dr. Patricio with bilateral sentinel lymph nodes on January 21, 2022.  The final pathology on the left showed an invasive duct carcinoma with 4 separate satellite lesions with the main tumor measuring 2.7 cm and some surrounding DCIS but she had close margins on initial excision especially on the superior aspect but had a positive inferior margin on the further margin excisions but had 3 sentinel lymph nodes 2 with macrometastasis with some extranodal extension and the third sentinel node with a micrometastasis measuring 0.3 mm and a fourth nonsentinel lymph node which was negative making this a pathologic anatomic stage IIA but pathologic prognostic stage IA breast cancer (ER/NC positive greater than 95% and HER-2/merrill negative with a Ki-67 of 25%).  The separate left breast excision of the 12:00 sclerosing papilloma showed no residual lesion and was benign showing biopsy site changes.  The right breast partial mastectomy showed a classical invasive lobular cancer measuring 5 cm and she had 2 sentinel lymph nodes 1 with some isolated tumor cells in the initial wide excision showed a close medial margin but formal margins showed a positive inferior margin with a classical invasive lobular cancer making this a pathologic anatomic stage IIA but pathologic prognostic stage IA breast cancer (ER/NC positive at 99% and HER-2/merrill negative with a Ki-67 of 8%).  The patient has was seen Dr. Mccullough from radiation oncology and Dr. Jones.  She underwent Oncotype recurrence scores by Dr. Jones and the right lobular cancer had a recurrence score of 10 and the left node positive ductal cancer had a recurrence score of 17.  She was entered into the FLEX trial and MammaPrint as part of the FLEX trial showed a low risk luminal A subtype on both bilateral cancers.  I had a discussion with Dr. Jones and we have decided to move forward with surgery given the improved prognostic genomic profiles.  Due to the difficulty in determining the inferior margin as well as the multifocal nature is of her cancers, I recommended bilateral mastectomies and advised removal of both nipple areolar complexes and using the reduction mastopexy incisions.  She was seen Dr. Patricio in regards to reconstruction options.  She did have a second opinion down at [facility] who agreed with the axillary lymph node dissection but they felt that she could possibly just undergo radiation at this point but given the multifocal nature of this bilateral breast cancer I was uncomfortable with just radiation given the margin status.  She underwent the surgery with bilateral total mastectomies and left axillary lymph node dissection and she had bilateral prepectoral expander reconstructions with AlloDerm by Dr. Patricio performed on April 11, 2022.  The final pathology showed no further cancer in either mastectomy specimen and she had 9 negative left axillary lymph nodes however a separate lower inner quadrant anterior margin on the right side showed a 5 mm focus of invasive lobular cancer which focally extended to the anterior margin. On exam today, she has healed well and no evidence of any recurrence over either prepectoral expander and has tolerated chest wall radiation well. The patient followed up with Dr. Sangeeta Jones and MammaPrints were sent on the bilateral tumors which turned out to be low risk luminal A types.  She was recommended to start letrozole and Verzenio after radiation.  She did undergo bilateral external beam chest wall radiation with Dr. Mccullough which finished in July 2022.  She continues to follow up with Dr. Patricio from a cosmetic standpoint.  I would like to see her again in another 6 months for routine follow-up.  She did receive lymphedema therapy due to the radiation and axillary lymph node dissection on the left side is wearing a lymphedema sleeve on the left.  She should continue to follow-up with Dr. Bryant and remain on Femara and Verzenio.  She is having a fair amount of diarrhea on the Verzenio.

## 2022-08-22 NOTE — REASON FOR VISIT
[FreeTextEntry1] : The patient comes in with a strong family history of breast cancer and ovarian cancer and a personal history of undergoing a right breast 12:00 ultrasound-guided core biopsy showing atypical duct hyperplasia and then a wide excision in March 2019 just showing a radial sclerosing lesion with no further atypia.  She has a Caren model risk of 15.7%.  She was then found to have an abnormal mammography and ultrasound  in October 2021 with a suspicious approximately 2 cm left breast lower inner quadrant density which was biopsied on November 5, 2021 showing intermediate grade invasive duct cancer which was ER/MI strongly positive and HER-2/merrill negative with a Ki-67 between 40 and 50%.  She underwent an MRI November 2021 and was found to have a suspicious area in the lower inner aspect of the right breast as well as another area of enhancement in the central left breast and MRI core biopsy showed a right breast lower inner quadrant density to be an intermediate grade invasive lobular cancer which was ER/MI positive HER-2/merrill negative with a Ki-67 of 25%.  The central left breast biopsy showed a sclerosed papillary lesion.  Genetic testing was negative.  She underwent bilateral partial mastectomies with bilateral mastopexies and tissue rearrangement by Dr. Patricio with bilateral sentinel lymph node biopsies in January 2022.  Final pathology showed 4 separate satellite lesions in the left breast which was a ductal cancer with the largest focus measuring 2.7 cm but she had a positive inferior margin and had 3 sentinel nodes 2 with macrometastasis with extranodal extension and the third sentinel node with a micrometastasis and 1 negative nonsentinel lymph node making this a pathologic prognostic stage IB breast cancer.  The right breast partial mastectomy showed a 5 cm classical invasive lobular cancer with 2 sentinel nodes 1 with some isolated tumor cells and she also had a positive inferior margin with lobular cancer making this a pathologic prognostic stage IA breast cancer.  She had bilateral positive inferior margins and macrometastasis in the left axillary sentinel lymph nodes.  She underwent Oncotype recurrence scores by Dr. Jones and the right lobular cancer had a recurrence score of 10 and the left node positive ductal cancer had a recurrence score of 17.  She was entered into the FLEX trial and MammaPrint showed a low risk luminal A subtype.  It was decided that she undergo bilateral mastectomies for the positive margins and a left axillary lymph node dissection which was performed on April 11, 2022 prior to any chemotherapy decision.  The final pathology showed no further cancer in either mastectomy specimen and she had 9 negative left axillary lymph nodes however a separate lower inner quadrant anterior margin on the right side showed a 5 mm focus of invasive lobular cancer which focally extended to the anterior margin.  She was seen by radiation and medical oncology and received bilateral chest wall radiation and was then placed on Femara and Verzenio.  She comes in now for routine follow-up.

## 2022-08-22 NOTE — HISTORY OF PRESENT ILLNESS
[FreeTextEntry1] : The patient is a 67-year-old G3, P3 postmenopausal white female.  She underwent menarche at age 11 and had her first child at age 24.  She underwent menopause at age 54 and never took any hormone replacement therapy.  She has a family history with her mother who had colon cancer and passed away at age 72.  Her paternal grandmother passed away from breast cancer in her 60s.  A maternal cousin had ovarian cancer at age 55 and then breast cancer at age 63 and tested BRCA negative.  2 paternal cousins had breast cancer at ages 45 and age 50.  Both tested BRCA negative.  Her sister had a phyllodes tumor excised.  I take care of both her sisters, Racheal Stevenson and Mrs. Dunn.  She comes in now and was found to have some architectural distortion in the 12:00 region of the right breast on mammography and ultrasound showed a 5 mm corresponding density.  Ultrasound-guided right breast 12:00 core biopsy at [facility] showed some atypical duct hyperplasia and she underwent a wide excision on March 27, 2019 showing a radial sclerosing lesion but no further atypia.  She has a Caren model lifetime risk of 15.7% and 5-year risk of 4.1%. She underwent routine mammography and ultrasound at [facility] on October 26, 2021 showing postsurgical changes in the right breast  but there was a new ill-defined spiculated mass in the left breast lower inner 7:00 region measuring about 2 cm on mammography and 1.4 x 1.7 x 1.2 cm on ultrasound in the 7:00 region 6 cm from the nipple.  There was also an enlarged left lower axillary lymph node measuring 4.4 x 1.3 cm.  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  MRI performed on November 29, 2021 showed another suspicious linear area of nonmasslike enhancement in the central left breast as well as an area of non-mass-like enhancement in the right breast lower inner and central region measuring about 4.7 cm.  She underwent bilateral MRI guided core biopsies performed on December 9, 2021 and the left central region showed a sclerosed papillary lesion.  The right breast lower inner quadrant showed an intermediate grade invasive lobular cancer which was ER positive at 80%, SD positive at 40%, HER-2/merrill negative by IHC with a Ki-67 of 25%.  Inv[employer]ae genetic panel testing was performed preoperatively and was negative.  She was seen by Dr. Sangeeta Jones preoperatively.  She underwent localizations preoperatively and then underwent bilateral partial mastectomies with bilateral mastopexies and tissue rearrangement by Dr. Patricio with bilateral sentinel lymph nodes on January 21, 2022.  The final pathology on the left showed an invasive duct carcinoma with 4 separate satellite lesions with the main tumor measuring 2.7 cm and some surrounding DCIS but she had close margins on initial excision especially on the superior aspect but had a positive inferior margin on the further margin excisions but had 3 sentinel lymph nodes 2 with macrometastasis with some extranodal extension and the third sentinel node with a micrometastasis measuring 0.3 mm and a fourth nonsentinel lymph node which was negative making this a pathologic anatomic stage IIA but pathologic prognostic stage IA breast cancer (ER/SD positive greater than 95% and HER-2/merrill negative with a Ki-67 of 25%).  The separate left breast excision of the 12:00 sclerosing papilloma showed no residual lesion and was benign showing biopsy site changes.  The right breast partial mastectomy showed a classical invasive lobular cancer measuring 5 cm and she had 2 sentinel lymph nodes 1 with some isolated tumor cells in the initial wide excision showed a close medial margin but formal margins showed a positive inferior margin with a classical invasive lobular cancer making this a pathologic anatomic stage IIA but pathologic prognostic stage IA breast cancer (ER/SD positive at 99% and HER-2/merrill negative with a Ki-67 of 8%).  She underwent Oncotype recurrence scores by Dr. Jones and the right lobular cancer had a recurrence score of 10 and the left node positive ductal cancer had a recurrence score of 17.  She was entered into the FLEX trial and MammaPrint showed a low risk luminal A subtype on each bilateral cancer.  After discussion with the medical oncologist, it was decided to go forward with bilateral mastectomies with left axillary lymph node dissection and she had bilateral prepectoral expander reconstructions with AlloDerm performed on April 11, 2022. The final pathology showed no further cancer in either mastectomy specimen and she had 9 negative left axillary lymph nodes however a separate lower inner quadrant anterior margin on the right side showed a 5 mm focus of invasive lobular cancer which focally extended to the anterior margin.  She underwent bilateral chest wall radiation through Dr. Mccullough which finished in July 2022.  She is currently on letrozole and Verzenio.  She comes in for routine follow-up.

## 2022-08-22 NOTE — PHYSICAL EXAM
[Normocephalic] : normocephalic [Atraumatic] : atraumatic [EOMI] : extra ocular movement intact [Supple] : supple [No Supraclavicular Adenopathy] : no supraclavicular adenopathy [No Cervical Adenopathy] : no cervical adenopathy [Examined in the supine and seated position] : examined in the supine and seated position [Breast Mass Right Breast ___cm] : no masses [Breast Mass Left Breast ___cm] : no masses [No Axillary Lymphadenopathy] : no left axillary lymphadenopathy [No Edema] : no edema [No Rashes] : no rashes [No Ulceration] : no ulceration [de-identified] : On exam, the patient has well after her bilateral total mastectomies with left axillary lymph node dissection and bilateral prepectoral expander reconstructions.  She did undergo bilateral chest wall radiation and has radiation changes bilaterally.  On palpation, I cannot feel any suspicious densities over either expander. [de-identified] : Status post lower inner quadrant partial mastectomy with tissue rearrangement oncoplastic reduction mastopexy and sentinel lymph node biopsy followed by later completion mastectomy for positive margins and prepectoral expander reconstruction and then postmastectomy chest wall radiation with no suspicious findings. [de-identified] : Status post lower inner quadrant partial mastectomy with tissue rearrangement oncoplastic reduction mastopexy and sentinel lymph node biopsy followed by completion total mastectomy with left axillary lymph node dissection and prepectoral expander reconstruction due to positive margins and positive sentinel lymph nodes.  She then had chest wall radiation and has radiation changes but no suspicious findings.

## 2022-08-25 ENCOUNTER — NON-APPOINTMENT (OUTPATIENT)
Age: 67
End: 2022-08-25

## 2022-08-30 ENCOUNTER — RESULT REVIEW (OUTPATIENT)
Age: 67
End: 2022-08-30

## 2022-08-30 ENCOUNTER — APPOINTMENT (OUTPATIENT)
Dept: HEMATOLOGY ONCOLOGY | Facility: CLINIC | Age: 67
End: 2022-08-30

## 2022-08-30 VITALS
WEIGHT: 220 LBS | RESPIRATION RATE: 18 BRPM | HEIGHT: 67 IN | DIASTOLIC BLOOD PRESSURE: 90 MMHG | OXYGEN SATURATION: 95 % | BODY MASS INDEX: 34.53 KG/M2 | HEART RATE: 64 BPM | TEMPERATURE: 98 F | SYSTOLIC BLOOD PRESSURE: 158 MMHG

## 2022-08-30 DIAGNOSIS — J18.9 PNEUMONIA, UNSPECIFIED ORGANISM: ICD-10-CM

## 2022-08-30 PROCEDURE — 99214 OFFICE O/P EST MOD 30 MIN: CPT

## 2022-08-30 NOTE — PHYSICAL EXAM
[Fully active, able to carry on all pre-disease performance without restriction] : Status 0 - Fully active, able to carry on all pre-disease performance without restriction [Normal] : affect appropriate [de-identified] : b/l breast slightly asymmetrical s/p bl lumpectomy with tissue rearrangment. well healed surgical incisions with palpable scar tissue. expanders in place. no axillary lymphadenopathy noted bilaterally.

## 2022-08-30 NOTE — REVIEW OF SYSTEMS
[Negative] : Allergic/Immunologic [SOB on Exertion] : shortness of breath during exertion [Insomnia] : insomnia [Anxiety] : anxiety [Depression] : depression [Shortness Of Breath] : no shortness of breath [Wheezing] : no wheezing [Cough] : no cough [Hot Flashes] : hot flashes [FreeTextEntry7] : mild diarrhea after starting verzenio.

## 2022-08-30 NOTE — ASSESSMENT
[FreeTextEntry1] : This is a 67-year-old postmenopausal woman presenting with bilateral breast cancer.\par Routine screening mammogram revealing for new abnormality on the left biopsy revealing for invasive ductal carcinoma ER/OK strongly positive HER-2/merrill negative.  Initially there was an enlarged lymph node but this was thought to be negative due to appearance and the lack of visualization on MRI.  Then on MRI in November 2021 patient was found to have a contralateral breast mass on the right measuring 4.7 cm biopsy was revealing for invasive lobular carcinoma grade 2 ER/OK positive HER-2/merrill negative.  She is now planned for bilateral lumpectomies.\par \par 1) bilateral breast cancer \par - the right is in T2N 0 (i) with a 5 cm invasive lobular carcinoma and 2 lymph nodes positive for isolated tumor cells.  ER/OK strongly positive at 99% respectively HER-2 negative Ki-67 8%, grade 2 , no LVI \par -The left side is positive T2N1a for a 2.7 cm invasive ductal carcinoma grade 2 no lymphovascular invasion  2 macrometastases to the lymph node and 1 micrometastases for a grand total of 3 out of 4 this tumor is also ER/OK positive strongly at 95 and 99% respectively but the Ki-67 is higher at 25%\par \par -Oncotype DX score was reviewed today at length with the patient and her daughter on the left the score is low at 17 which equates to a 15% risk of distant recurrence in the next 10 years on the right the scores also low risk at 10 with a 3% risk of distant recurrence over the next 10 years.\par -Because both Oncotype's are low risk this would indicate that based on the molecular biology there would be no significant benefit to giving chemotherapy to reduce the risk of recurrence.  However in looking at the clinical risks of both cancers the left with the high Ki-67 and the 3+ lymph nodes clinically the patient is high risk.\par -Reviewed with patient that both MammaPrint sent on both the right and left cancers results also came back low risk a consistent with a lack of chemotherapy benefit.\par -Explained the difference between molecular phenotype low risk score and how these both can be incorporated.\par -Reviewed the final pathology from the bilateral mastectomy which shows residual cancer on the right and on the left there are no additional positive lymph nodes.  Therefore the final staging as was determined by the lumpectomy specimens is unchanged.\par -Again reviewed that in order for the molecular studies to be valid especially the Oncotype have to consider the Rxsponder trial and the enrollment criteria which included 3 or less positive lymph nodes.\par -Because the additional lymph node dissection did not show any additional positive lymph nodes the patient falls within this criteria.\par However I did explain that given the 2 primaries and especially given that the right side was over 5 cm she would considered a higher risk side clinically especially given that the left sided breast cancer was lymph node positive.\par 1 could consider giving chemotherapy in this situation however given that both Oncotype and MammaPrint would argue that there is no benefit to chemotherapy based on the molecular phenotype this must be taken into consideration.  Prior to molecular studies it would have been standard to give chemotherapy however now we know based on the molecular phenotype that chemotherapy may not be beneficial in this setting and that hormone therapy may be more beneficial.  In addition now we have the option of giving a CK4 6 inhibitor in the adjuvant setting for high risk patients that I believe this patient would fall into given the high Ki-67 and the large size and multiple positive lymph nodes.\par -we mutually decided to forgo chemotherapy and proceed with endocrine therapy \par -We discussed hormonal therapy at length including the mechanism efficacy and side effects.  We decided to go with letrozole, discussed the side effects of joint pains hot flashes and bone thinning.\par -In addition we discussed Verzenio, and the besomebody. E data.  We discussed getting some efficacy and side effects including the risk of cytopenias, GI side effects, fibrosis.  \par - ct and bone scan negative for mets\par -will follow up scans in about 3 months to follow up splenic lesions \par -s/p radiation \par -started letrozole May 2022- tolerating okay\par -Verzenio started July 2022. went to [facility] ER for SOB, rash, congestion, and leg swelling. records reviewed. likely r/t to verzenio. currently on hold. started 40mg prednisone 08/26/22. continue to hold verzenio given pulm toxicity possible pneumonitis . taper steroids to 30mg starting 9/2/22. \par \par # vit d \par cont vit d \par \par # osteopenia \par check dexa \par \par #anxiety \par xanax as needed \par emotional support given \par \par #splenic lesion\par Incidental finding CT c/a/p - few lesions largest measuring 1.5cm.\par  repeat CTA 08/2022 show stable in appearance- 1.2cm\par \par Dw patient at length time\par follow up in 1 week\par \par \par

## 2022-08-30 NOTE — HISTORY OF PRESENT ILLNESS
[de-identified] : This is a 67-year-old postmenopausal woman presenting with a new diagnosis of breast cancer\par Patient goes for regular routine screening mammograms and was initally  found to have architectural distortion at the right breast 12 o'clock position, ultrasound revealing for a 5 mm density she underwent a right breast  biopsy at [facility]  on 3/2019.  which showed atypical ductal hyperplasia and then underwent wide excision in March 2019 showing a radial sclerosing lesion but no further atypia or malignancy. Dr juarez. \par ? chemo prevention ? \par  Patient follows with Dr. Jessica Hi due to the high risk status and goes for regular mammo and MRI \par \par Routine screening mammogram on October 26, 2021 showed postsurgical changes right breast but there was a new ill-defined spiculated mass left breast 7 o'clock position measuring 2 cm there is also an enlarged left axillary lymph node measuring 4.4 x 1.3 cm ( this appeared benign  was not seen on  MRI) \par \par patient underwent a left breast ultrasound-guided core biopsy on November 5, 2021  which revealed an intermediate grade invasive  ductal carcinoma ER/OK strongly +95 to 100% HER-2/merrill negative Ki-67 between 40 and 50%.\par \par MRI performed on November 29, 2021 revealing for a biopsy-proven malignancy in her left breast up to 2.1 cm with suspicious linear nonmasslike enhancement extending to the anterior inferior index mass total suspicious area of enhancement is 3.3 cm also linear non-mass enhancement left breast at the central portion of the breast MRI guided biopsy was recommended as well suspicious nonletter masslike enhancement in the inner right breast measuring 4.7 cm MRI guided biopsy was recommended.\par \par MRI guided biopsy performed on December 9, 2021 revealing for the following left breast non-mass-like enhancement showed usual ductal hyperplasia\par \par Right breast non-mass-like enhancement lower inner quadrant revealed grade 2 invasive lobular carcinoma grade 2 out of 3 invasive carcinoma was present in multiple cores up to 12 mm LCIS was also present and focal atypical ductal hyperplasia\par Tumor was ER +80% OK +40% HER-2/merrill negative Ki-67 25%.\par \par Await genetic testing , monday.  9 genes\par Daughter is negative for BRCA \par \par Patient has extensive family history of cancer including a mother who had colon cancer a paternal grandmother who had breast cancer maternal cousin who had ovarian cancer and then breast cancer.  2 paternal cousins who had breast cancer her sister had a phyllodes \par \par Status post bilateral partial mastectomies with sentinel lymph node biopsy on January 21, 2022 with Dr. Jessica Hi.\par \par Right side-final pathological staging PT2N0(i) \par -Largest tumor 5 cm classic invasive lobular smaller tumor measuring 4 mm\par -Grade 2, no lymphovascular invasion\par -Margin less than 1 mm inferior\par -1 out of 2 lymph nodes revealing for isolated tumor cells\par -ER OK positive both at 99% HER-2 negative Ki-67 8%\par \par Left side - final path T2N1a\par Largest tumor 2.7 cm with satellite lesions measuring 1 mm and 2 mm invasive ductal carcinoma\par -Grade 2 no lymphovascular invasion\par 2 lymph nodes positive for macro metastases measuring 2.4 cm with extranodal extension\par 1 lymph node positive for micro met for a total of 3 out of 4 lymph nodes positive\par ER/OK +95% and 99%, HER-2 negative Ki-67 25%\par Margins were also close as well.\par \par Oncotype DX score on the left was low risk at 17 equating to a 15% risk of distant recurrence.\par Oncotype DX score on the right was low risk at 10 with a 3% risk of distant recurrence over the next 10 years.\par \par CT of the chest abdomen pelvis show hypoattenuation lesion in the spleen measuring 1.5 cm but no evidence of metastatic disease\par Bone scan negative for any evidence of metastatic disease.\par \par \par \par \par \par \par \par  [de-identified] : Patient had bilateral mastectomy with left axillary lymph node dissection, April 11, 2022 Dr. Teixeira.  Patient finished radiation three weeks ago.  Feeling OK but she has acid reflux, fatigue and sweating.   She states that she is sleeping poorly, she takes melatonin with little relief.    \par \par MammaPrint was performed on both the left and right breast both were revealing for low risk luminal type a indicating no significant chemotherapy benefit\par \par 08/30/2022 interval hx\par went to ER 08/25/22- reviewed notes and labs from [facility].\par started prednisone 08/26/22. 20mg B.I.D. \par breathing improved. rash to left lower thigh resolved. feeling okay \par trouble sleeping- melatonin did not help. \par better. fatigued. breathing better. \par has  appt with pulmonlogist 09/2022 \par saw PCP\par ct of abdomen . bring disk\par going to see Dr. Patricio for expander follow up \par \par

## 2022-09-07 ENCOUNTER — RESULT REVIEW (OUTPATIENT)
Age: 67
End: 2022-09-07

## 2022-09-07 ENCOUNTER — APPOINTMENT (OUTPATIENT)
Dept: HEMATOLOGY ONCOLOGY | Facility: CLINIC | Age: 67
End: 2022-09-07

## 2022-09-07 VITALS
WEIGHT: 222 LBS | TEMPERATURE: 97.8 F | BODY MASS INDEX: 34.84 KG/M2 | HEART RATE: 62 BPM | OXYGEN SATURATION: 97 % | DIASTOLIC BLOOD PRESSURE: 94 MMHG | RESPIRATION RATE: 18 BRPM | HEIGHT: 67 IN | SYSTOLIC BLOOD PRESSURE: 171 MMHG

## 2022-09-07 PROCEDURE — 99213 OFFICE O/P EST LOW 20 MIN: CPT

## 2022-09-08 NOTE — HISTORY OF PRESENT ILLNESS
[de-identified] : This is a 67-year-old postmenopausal woman presenting with a new diagnosis of breast cancer\par Patient goes for regular routine screening mammograms and was initally  found to have architectural distortion at the right breast 12 o'clock position, ultrasound revealing for a 5 mm density she underwent a right breast  biopsy at [facility]  on 3/2019.  which showed atypical ductal hyperplasia and then underwent wide excision in March 2019 showing a radial sclerosing lesion but no further atypia or malignancy. Dr juarez. \par ? chemo prevention ? \par  Patient follows with Dr. Jessica Hi due to the high risk status and goes for regular mammo and MRI \par \par Routine screening mammogram on October 26, 2021 showed postsurgical changes right breast but there was a new ill-defined spiculated mass left breast 7 o'clock position measuring 2 cm there is also an enlarged left axillary lymph node measuring 4.4 x 1.3 cm ( this appeared benign  was not seen on  MRI) \par \par patient underwent a left breast ultrasound-guided core biopsy on November 5, 2021  which revealed an intermediate grade invasive  ductal carcinoma ER/WI strongly +95 to 100% HER-2/merrill negative Ki-67 between 40 and 50%.\par \par MRI performed on November 29, 2021 revealing for a biopsy-proven malignancy in her left breast up to 2.1 cm with suspicious linear nonmasslike enhancement extending to the anterior inferior index mass total suspicious area of enhancement is 3.3 cm also linear non-mass enhancement left breast at the central portion of the breast MRI guided biopsy was recommended as well suspicious nonletter masslike enhancement in the inner right breast measuring 4.7 cm MRI guided biopsy was recommended.\par \par MRI guided biopsy performed on December 9, 2021 revealing for the following left breast non-mass-like enhancement showed usual ductal hyperplasia\par \par Right breast non-mass-like enhancement lower inner quadrant revealed grade 2 invasive lobular carcinoma grade 2 out of 3 invasive carcinoma was present in multiple cores up to 12 mm LCIS was also present and focal atypical ductal hyperplasia\par Tumor was ER +80% WI +40% HER-2/merrill negative Ki-67 25%.\par \par Await genetic testing , monday.  9 genes\par Daughter is negative for BRCA \par \par Patient has extensive family history of cancer including a mother who had colon cancer a paternal grandmother who had breast cancer maternal cousin who had ovarian cancer and then breast cancer.  2 paternal cousins who had breast cancer her sister had a phyllodes \par \par Status post bilateral partial mastectomies with sentinel lymph node biopsy on January 21, 2022 with Dr. Jessica Hi.\par \par Right side-final pathological staging PT2N0(i) \par -Largest tumor 5 cm classic invasive lobular smaller tumor measuring 4 mm\par -Grade 2, no lymphovascular invasion\par -Margin less than 1 mm inferior\par -1 out of 2 lymph nodes revealing for isolated tumor cells\par -ER WI positive both at 99% HER-2 negative Ki-67 8%\par \par Left side - final path T2N1a\par Largest tumor 2.7 cm with satellite lesions measuring 1 mm and 2 mm invasive ductal carcinoma\par -Grade 2 no lymphovascular invasion\par 2 lymph nodes positive for macro metastases measuring 2.4 cm with extranodal extension\par 1 lymph node positive for micro met for a total of 3 out of 4 lymph nodes positive\par ER/WI +95% and 99%, HER-2 negative Ki-67 25%\par Margins were also close as well.\par \par Oncotype DX score on the left was low risk at 17 equating to a 15% risk of distant recurrence.\par Oncotype DX score on the right was low risk at 10 with a 3% risk of distant recurrence over the next 10 years.\par \par CT of the chest abdomen pelvis show hypoattenuation lesion in the spleen measuring 1.5 cm but no evidence of metastatic disease\par Bone scan negative for any evidence of metastatic disease.\par \par \par \par \par \par \par \par  [de-identified] : Patient had bilateral mastectomy with left axillary lymph node dissection, April 11, 2022 Dr. Teixeira.  Patient finished radiation three weeks ago.  Feeling OK but she has acid reflux, fatigue and sweating.   She states that she is sleeping poorly, she takes melatonin with little relief.    \par \par MammaPrint was performed on both the left and right breast both were revealing for low risk luminal type a indicating no significant chemotherapy benefit\par \par 08/30/2022 interval hx\par went to ER 08/25/22- reviewed notes and labs from [facility].\par started prednisone 08/26/22. 20mg B.I.D. \par breathing improved. rash to left lower thigh resolved. feeling okay \par trouble sleeping- melatonin did not help. \par better. fatigued. breathing better. \par has  appt with pulmonlogist 09/2022 \par saw PCP\par ct of abdomen . bring disk\par going to see Dr. Patricio for expander follow up \par \par 09/07/22 INTERVAL HX\par . started 30mg steroids on 08/30/22 \par change to 20mg on 09/9/22\par

## 2022-09-08 NOTE — REVIEW OF SYSTEMS
[Shortness Of Breath] : no shortness of breath [Wheezing] : no wheezing [Cough] : no cough [SOB on Exertion] : no shortness of breath during exertion [Insomnia] : insomnia [Anxiety] : no anxiety [Depression] : no depression [Hot Flashes] : no hot flashes [Negative] : Gastrointestinal

## 2022-09-08 NOTE — PHYSICAL EXAM
[Fully active, able to carry on all pre-disease performance without restriction] : Status 0 - Fully active, able to carry on all pre-disease performance without restriction [Normal] : affect appropriate [de-identified] : did not examine

## 2022-09-08 NOTE — ASSESSMENT
[FreeTextEntry1] : This is a 67-year-old postmenopausal woman presenting with bilateral breast cancer.\par Routine screening mammogram revealing for new abnormality on the left biopsy revealing for invasive ductal carcinoma ER/FL strongly positive HER-2/merrill negative.  Initially there was an enlarged lymph node but this was thought to be negative due to appearance and the lack of visualization on MRI.  Then on MRI in November 2021 patient was found to have a contralateral breast mass on the right measuring 4.7 cm biopsy was revealing for invasive lobular carcinoma grade 2 ER/FL positive HER-2/merrill negative.  She is now planned for bilateral lumpectomies.\par \par 1) bilateral breast cancer \par - the right is in T2N 0 (i) with a 5 cm invasive lobular carcinoma and 2 lymph nodes positive for isolated tumor cells.  ER/FL strongly positive at 99% respectively HER-2 negative Ki-67 8%, grade 2 , no LVI \par -The left side is positive T2N1a for a 2.7 cm invasive ductal carcinoma grade 2 no lymphovascular invasion  2 macrometastases to the lymph node and 1 micrometastases for a grand total of 3 out of 4 this tumor is also ER/FL positive strongly at 95 and 99% respectively but the Ki-67 is higher at 25%\par \par -Oncotype DX score was reviewed today at length with the patient and her daughter on the left the score is low at 17 which equates to a 15% risk of distant recurrence in the next 10 years on the right the scores also low risk at 10 with a 3% risk of distant recurrence over the next 10 years.\par -Because both Oncotype's are low risk this would indicate that based on the molecular biology there would be no significant benefit to giving chemotherapy to reduce the risk of recurrence.  However in looking at the clinical risks of both cancers the left with the high Ki-67 and the 3+ lymph nodes clinically the patient is high risk.\par -Reviewed with patient that both MammaPrint sent on both the right and left cancers results also came back low risk a consistent with a lack of chemotherapy benefit.\par -Explained the difference between molecular phenotype low risk score and how these both can be incorporated.\par -Reviewed the final pathology from the bilateral mastectomy which shows residual cancer on the right and on the left there are no additional positive lymph nodes.  Therefore the final staging as was determined by the lumpectomy specimens is unchanged.\par -Again reviewed that in order for the molecular studies to be valid especially the Oncotype have to consider the Rxsponder trial and the enrollment criteria which included 3 or less positive lymph nodes.\par -Because the additional lymph node dissection did not show any additional positive lymph nodes the patient falls within this criteria.\par However I did explain that given the 2 primaries and especially given that the right side was over 5 cm she would considered a higher risk side clinically especially given that the left sided breast cancer was lymph node positive.\par 1 could consider giving chemotherapy in this situation however given that both Oncotype and MammaPrint would argue that there is no benefit to chemotherapy based on the molecular phenotype this must be taken into consideration.  Prior to molecular studies it would have been standard to give chemotherapy however now we know based on the molecular phenotype that chemotherapy may not be beneficial in this setting and that hormone therapy may be more beneficial.  In addition now we have the option of giving a CK4 6 inhibitor in the adjuvant setting for high risk patients that I believe this patient would fall into given the high Ki-67 and the large size and multiple positive lymph nodes.\par -we mutually decided to forgo chemotherapy and proceed with endocrine therapy \par -We discussed hormonal therapy at length including the mechanism efficacy and side effects.  We decided to go with letrozole, discussed the side effects of joint pains hot flashes and bone thinning.\par -In addition we discussed Verzenio, and the Asantae E data.  We discussed getting some efficacy and side effects including the risk of cytopenias, GI side effects, fibrosis.  \par - ct and bone scan negative for mets\par -will follow up scans in about 3 months to follow up splenic lesions \par -s/p radiation \par -started letrozole May 2022- tolerating okay\par -Verzenio started July 2022. went to [facility] ER for SOB, rash, congestion, and leg swelling. records reviewed. likely r/t to verzenio. currently on hold. started 40mg prednisone 08/26/22. continue to hold verzenio given pulm toxicity possible pneumonitis . tolerating steroids well. significant improvement of her prior symptoms. decrease dose to 20mg starting 09/09/22 then decrease to 10mg after 1 week then 10mg alternating each day. \par -pulse ox while ambulating >93%. \par \par # vit d \par cont vit d \par \par # osteopenia \par up to date aug 2022\par \par #anxiety \par xanax as needed \par emotional support given \par \par #splenic lesion\par Incidental finding CT c/a/p - few lesions largest measuring 1.5cm.\par  repeat CTA 08/2022 show stable in appearance- 1.2cm\par \par Dw patient at length time\par follow up in 3 weeks after completion of steroids \par \par \par

## 2022-10-25 ENCOUNTER — RESULT REVIEW (OUTPATIENT)
Age: 67
End: 2022-10-25

## 2022-10-25 ENCOUNTER — APPOINTMENT (OUTPATIENT)
Dept: HEMATOLOGY ONCOLOGY | Facility: CLINIC | Age: 67
End: 2022-10-25

## 2022-10-25 PROCEDURE — 99213 OFFICE O/P EST LOW 20 MIN: CPT

## 2022-10-25 RX ORDER — PREDNISONE 10 MG/1
10 TABLET ORAL
Qty: 60 | Refills: 0 | Status: COMPLETED | COMMUNITY
Start: 2022-08-30 | End: 2022-10-25

## 2022-10-25 RX ORDER — ABEMACICLIB 150 MG/1
150 TABLET ORAL
Qty: 60 | Refills: 6 | Status: DISCONTINUED | COMMUNITY
Start: 2022-07-27 | End: 2022-10-25

## 2022-10-25 NOTE — REVIEW OF SYSTEMS
[Insomnia] : insomnia [Negative] : Allergic/Immunologic [Fatigue] : fatigue [Recent Change In Weight] : ~T recent weight change [SOB on Exertion] : shortness of breath during exertion [Joint Pain] : joint pain [Joint Stiffness] : joint stiffness [Muscle Weakness] : muscle weakness [Difficulty Walking] : difficulty walking [Anxiety] : anxiety [Depression] : depression [Hot Flashes] : hot flashes [Fever] : no fever [Chills] : no chills [Night Sweats] : no night sweats [Shortness Of Breath] : no shortness of breath [Wheezing] : no wheezing [Cough] : no cough [Abdominal Pain] : no abdominal pain [Vomiting] : no vomiting [Constipation] : no constipation [Diarrhea] : no diarrhea [Muscle Pain] : no muscle pain [FreeTextEntry2] : gaining weight.  [FreeTextEntry3] : sometimes with left eye blurriness- seldom [FreeTextEntry6] : improved from prior , but still having mild SOB upon incline walks.  [FreeTextEntry7] : bloating  [de-identified] : had HA . does not happen often.

## 2022-10-25 NOTE — PHYSICAL EXAM
[Fully active, able to carry on all pre-disease performance without restriction] : Status 0 - Fully active, able to carry on all pre-disease performance without restriction [Normal] : affect appropriate [de-identified] : b/l breast s/p partial mastectomy and expanders in place. no palpable masses nor lesions. no axillary lymphadenopathy b/l.  [de-identified] : mildly distended [de-identified] : LLE swelling

## 2022-10-25 NOTE — HISTORY OF PRESENT ILLNESS
[de-identified] : This is a 67-year-old postmenopausal woman presenting with a new diagnosis of breast cancer\par Patient goes for regular routine screening mammograms and was initally  found to have architectural distortion at the right breast 12 o'clock position, ultrasound revealing for a 5 mm density she underwent a right breast  biopsy at [facility]  on 3/2019.  which showed atypical ductal hyperplasia and then underwent wide excision in March 2019 showing a radial sclerosing lesion but no further atypia or malignancy. Dr juarez. \par ? chemo prevention ? \par  Patient follows with Dr. Jessica Hi due to the high risk status and goes for regular mammo and MRI \par \par Routine screening mammogram on October 26, 2021 showed postsurgical changes right breast but there was a new ill-defined spiculated mass left breast 7 o'clock position measuring 2 cm there is also an enlarged left axillary lymph node measuring 4.4 x 1.3 cm ( this appeared benign  was not seen on  MRI) \par \par patient underwent a left breast ultrasound-guided core biopsy on November 5, 2021  which revealed an intermediate grade invasive  ductal carcinoma ER/MD strongly +95 to 100% HER-2/merrill negative Ki-67 between 40 and 50%.\par \par MRI performed on November 29, 2021 revealing for a biopsy-proven malignancy in her left breast up to 2.1 cm with suspicious linear nonmasslike enhancement extending to the anterior inferior index mass total suspicious area of enhancement is 3.3 cm also linear non-mass enhancement left breast at the central portion of the breast MRI guided biopsy was recommended as well suspicious nonletter masslike enhancement in the inner right breast measuring 4.7 cm MRI guided biopsy was recommended.\par \par MRI guided biopsy performed on December 9, 2021 revealing for the following left breast non-mass-like enhancement showed usual ductal hyperplasia\par \par Right breast non-mass-like enhancement lower inner quadrant revealed grade 2 invasive lobular carcinoma grade 2 out of 3 invasive carcinoma was present in multiple cores up to 12 mm LCIS was also present and focal atypical ductal hyperplasia\par Tumor was ER +80% MD +40% HER-2/merrill negative Ki-67 25%.\par \par Await genetic testing , monday.  9 genes\par Daughter is negative for BRCA \par \par Patient has extensive family history of cancer including a mother who had colon cancer a paternal grandmother who had breast cancer maternal cousin who had ovarian cancer and then breast cancer.  2 paternal cousins who had breast cancer her sister had a phyllodes \par \par Status post bilateral partial mastectomies with sentinel lymph node biopsy on January 21, 2022 with Dr. Jessica Hi.\par \par Right side-final pathological staging PT2N0(i) \par -Largest tumor 5 cm classic invasive lobular smaller tumor measuring 4 mm\par -Grade 2, no lymphovascular invasion\par -Margin less than 1 mm inferior\par -1 out of 2 lymph nodes revealing for isolated tumor cells\par -ER MD positive both at 99% HER-2 negative Ki-67 8%\par \par Left side - final path T2N1a\par Largest tumor 2.7 cm with satellite lesions measuring 1 mm and 2 mm invasive ductal carcinoma\par -Grade 2 no lymphovascular invasion\par 2 lymph nodes positive for macro metastases measuring 2.4 cm with extranodal extension\par 1 lymph node positive for micro met for a total of 3 out of 4 lymph nodes positive\par ER/MD +95% and 99%, HER-2 negative Ki-67 25%\par Margins were also close as well.\par \par Oncotype DX score on the left was low risk at 17 equating to a 15% risk of distant recurrence.\par Oncotype DX score on the right was low risk at 10 with a 3% risk of distant recurrence over the next 10 years.\par \par CT of the chest abdomen pelvis show hypoattenuation lesion in the spleen measuring 1.5 cm but no evidence of metastatic disease\par Bone scan negative for any evidence of metastatic disease.\par \par \par \par \par \par \par \par  [de-identified] : \par next ann appt feb 2023\par completed steroids end of Sept 2022 \par rash to left thigh resolved\par last saw Pulmonologist 09/2022- going back?\par sleeping improved off of steroids\par splenic lesion 08/2022- stable 1.2cm \par 08/2022- HUGO infiltrate on CTA chest. \par repeat CT chest 10/05/22- no change in HUGO infiltrate - Dr. Devine ordered. started on inhaler\par plan for Dr. Rivera follow up after stable on medications\par letrazole switched from pm to am. today\par \par

## 2022-10-25 NOTE — ASSESSMENT
[FreeTextEntry1] : This is a 67-year-old postmenopausal woman presenting with bilateral breast cancer.\par Routine screening mammogram revealing for new abnormality on the left biopsy revealing for invasive ductal carcinoma ER/AK strongly positive HER-2/merrill negative.  Initially there was an enlarged lymph node but this was thought to be negative due to appearance and the lack of visualization on MRI.  Then on MRI in November 2021 patient was found to have a contralateral breast mass on the right measuring 4.7 cm biopsy was revealing for invasive lobular carcinoma grade 2 ER/AK positive HER-2/merrill negative.  She is now planned for bilateral lumpectomies.\par \par 1) bilateral breast cancer \par - the right is in T2N 0 (i) with a 5 cm invasive lobular carcinoma and 2 lymph nodes positive for isolated tumor cells.  ER/AK strongly positive at 99% respectively HER-2 negative Ki-67 8%, grade 2 , no LVI \par -The left side is positive T2N1a for a 2.7 cm invasive ductal carcinoma grade 2 no lymphovascular invasion  2 macrometastases to the lymph node and 1 micrometastases for a grand total of 3 out of 4 this tumor is also ER/AK positive strongly at 95 and 99% respectively but the Ki-67 is higher at 25%\par \par -Oncotype DX score was reviewed today at length with the patient and her daughter on the left the score is low at 17 which equates to a 15% risk of distant recurrence in the next 10 years on the right the scores also low risk at 10 with a 3% risk of distant recurrence over the next 10 years.\par -Because both Oncotype's are low risk this would indicate that based on the molecular biology there would be no significant benefit to giving chemotherapy to reduce the risk of recurrence.  However in looking at the clinical risks of both cancers the left with the high Ki-67 and the 3+ lymph nodes clinically the patient is high risk.\par -Reviewed with patient that both MammaPrint sent on both the right and left cancers results also came back low risk a consistent with a lack of chemotherapy benefit.\par -Explained the difference between molecular phenotype low risk score and how these both can be incorporated.\par -Reviewed the final pathology from the bilateral mastectomy which shows residual cancer on the right and on the left there are no additional positive lymph nodes.  Therefore the final staging as was determined by the lumpectomy specimens is unchanged.\par -Again reviewed that in order for the molecular studies to be valid especially the Oncotype have to consider the Rxsponder trial and the enrollment criteria which included 3 or less positive lymph nodes.\par -Because the additional lymph node dissection did not show any additional positive lymph nodes the patient falls within this criteria.\par However I did explain that given the 2 primaries and especially given that the right side was over 5 cm she would considered a higher risk side clinically especially given that the left sided breast cancer was lymph node positive.\par 1 could consider giving chemotherapy in this situation however given that both Oncotype and MammaPrint would argue that there is no benefit to chemotherapy based on the molecular phenotype this must be taken into consideration.  Prior to molecular studies it would have been standard to give chemotherapy however now we know based on the molecular phenotype that chemotherapy may not be beneficial in this setting and that hormone therapy may be more beneficial.  In addition now we have the option of giving a CK4 6 inhibitor in the adjuvant setting for high risk patients that I believe this patient would fall into given the high Ki-67 and the large size and multiple positive lymph nodes.\par -we mutually decided to forgo chemotherapy and proceed with endocrine therapy \par -We discussed hormonal therapy at length including the mechanism efficacy and side effects.  We decided to go with letrozole, discussed the side effects of joint pains hot flashes and bone thinning.\par -In addition we discussed Verzenio, and the ZEB E data.  We discussed getting some efficacy and side effects including the risk of cytopenias, GI side effects, fibrosis.  \par - ct and bone scan negative for mets\par -will follow up scans in about 3 months to follow up splenic lesions \par -s/p radiation \par -started letrozole May 2022- tolerated fairly in the beginning. Now with persistent debilitating sxs, joint pain, weight gain, hot flashes. Recommended to hold for 1 week to see if symptoms resolve. \par -Verzenio started July 2022. went to [facility] ER for SOB, rash, congestion, and leg swelling. records reviewed. likely r/t to verzenio. has been on hold x 3 months due to pulm toxicity. started 40mg prednisone 08/26/22 and tapered off . Now completely off of steroids as of Sept 30, 2022. Patient denies new or worsening cough. Repeat CT chest 10/05/22 shows stable pneumonitis. Will consider lowering dose due to prior toxicity 100mg verzenio B.I.D. \par \par # vit d \par cont vit d \par \par # osteopenia \par up to date aug 2022\par \par #anxiety \par xanax as needed \par emotional support given \par \par #splenic lesion\par Incidental finding CT c/a/p - few lesions largest measuring 1.5cm.\par  repeat CTA 08/2022 show stable in appearance- 1.2cm\par repeat in Nov 2022\par \par #insomnia\par likely r/t to steroids and letrazole \par consider trazadone. \par \par #LLE swelling\par Duplex r/o DVT \par \par Dw patient at length\par follow up in 1 week. \par \par \par

## 2022-10-27 ENCOUNTER — RESULT REVIEW (OUTPATIENT)
Age: 67
End: 2022-10-27

## 2022-11-02 ENCOUNTER — APPOINTMENT (OUTPATIENT)
Dept: HEMATOLOGY ONCOLOGY | Facility: CLINIC | Age: 67
End: 2022-11-02

## 2022-11-02 ENCOUNTER — RESULT REVIEW (OUTPATIENT)
Age: 67
End: 2022-11-02

## 2022-11-02 VITALS
WEIGHT: 227 LBS | BODY MASS INDEX: 35.63 KG/M2 | SYSTOLIC BLOOD PRESSURE: 157 MMHG | DIASTOLIC BLOOD PRESSURE: 85 MMHG | TEMPERATURE: 97.6 F | OXYGEN SATURATION: 96 % | RESPIRATION RATE: 18 BRPM | HEART RATE: 74 BPM | HEIGHT: 67 IN

## 2022-11-02 PROCEDURE — 99213 OFFICE O/P EST LOW 20 MIN: CPT

## 2022-11-03 RX ORDER — LETROZOLE TABLETS 2.5 MG/1
2.5 TABLET, FILM COATED ORAL DAILY
Qty: 90 | Refills: 3 | Status: DISCONTINUED | COMMUNITY
Start: 2022-05-06 | End: 2022-11-03

## 2022-11-03 NOTE — ASSESSMENT
[FreeTextEntry1] : This is a 67-year-old postmenopausal woman presenting with bilateral breast cancer.\par Routine screening mammogram revealing for new abnormality on the left biopsy revealing for invasive ductal carcinoma ER/SC strongly positive HER-2/merrill negative.  Initially there was an enlarged lymph node but this was thought to be negative due to appearance and the lack of visualization on MRI.  Then on MRI in November 2021 patient was found to have a contralateral breast mass on the right measuring 4.7 cm biopsy was revealing for invasive lobular carcinoma grade 2 ER/SC positive HER-2/merrill negative.  She is now planned for bilateral lumpectomies.\par \par 1) bilateral breast cancer \par - the right is in T2N 0 (i) with a 5 cm invasive lobular carcinoma and 2 lymph nodes positive for isolated tumor cells.  ER/SC strongly positive at 99% respectively HER-2 negative Ki-67 8%, grade 2 , no LVI \par -The left side is positive T2N1a for a 2.7 cm invasive ductal carcinoma grade 2 no lymphovascular invasion  2 macrometastases to the lymph node and 1 micrometastases for a grand total of 3 out of 4 this tumor is also ER/SC positive strongly at 95 and 99% respectively but the Ki-67 is higher at 25%\par \par -Oncotype DX score was reviewed today at length with the patient and her daughter on the left the score is low at 17 which equates to a 15% risk of distant recurrence in the next 10 years on the right the scores also low risk at 10 with a 3% risk of distant recurrence over the next 10 years.\par -Because both Oncotype's are low risk this would indicate that based on the molecular biology there would be no significant benefit to giving chemotherapy to reduce the risk of recurrence.  However in looking at the clinical risks of both cancers the left with the high Ki-67 and the 3+ lymph nodes clinically the patient is high risk.\par -Reviewed with patient that both MammaPrint sent on both the right and left cancers results also came back low risk a consistent with a lack of chemotherapy benefit.\par -Explained the difference between molecular phenotype low risk score and how these both can be incorporated.\par -Reviewed the final pathology from the bilateral mastectomy which shows residual cancer on the right and on the left there are no additional positive lymph nodes.  Therefore the final staging as was determined by the lumpectomy specimens is unchanged.\par -Again reviewed that in order for the molecular studies to be valid especially the Oncotype have to consider the Rxsponder trial and the enrollment criteria which included 3 or less positive lymph nodes.\par -Because the additional lymph node dissection did not show any additional positive lymph nodes the patient falls within this criteria.\par However I did explain that given the 2 primaries and especially given that the right side was over 5 cm she would considered a higher risk side clinically especially given that the left sided breast cancer was lymph node positive.\par 1 could consider giving chemotherapy in this situation however given that both Oncotype and MammaPrint would argue that there is no benefit to chemotherapy based on the molecular phenotype this must be taken into consideration.  Prior to molecular studies it would have been standard to give chemotherapy however now we know based on the molecular phenotype that chemotherapy may not be beneficial in this setting and that hormone therapy may be more beneficial.  In addition now we have the option of giving a CK4 6 inhibitor in the adjuvant setting for high risk patients that I believe this patient would fall into given the high Ki-67 and the large size and multiple positive lymph nodes.\par -we mutually decided to forgo chemotherapy and proceed with endocrine therapy \par -We discussed hormonal therapy at length including the mechanism efficacy and side effects.  We decided to go with letrozole, discussed the side effects of joint pains hot flashes and bone thinning.\par -In addition we discussed Verzenio, and the [employer] E data.  We discussed getting some efficacy and side effects including the risk of cytopenias, GI side effects, fibrosis.  \par - ct and bone scan negative for mets\par -s/p radiation \par -started letrozole May 2022- tolerated fairly in the beginning. Now with persistent debilitating sxs, joint pain, weight gain, hot flashes. Recommended to hold for 1 week to see if symptoms resolve. \par -Verzenio started July 2022. went to [facility] ER for SOB, rash, congestion, and leg swelling. records reviewed. likely r/t to verzenio. has been on hold x 3 months due to pulm toxicity. started 40mg prednisone 08/26/22 and tapered off . Now completely off of steroids as of Sept 30, 2022. Patient denies new or worsening cough. Repeat CT chest 10/05/22 shows stable pneumonitis. Will consider lowering dose due to prior toxicity 100mg verzenio B.I.D. \par - pt will start 100mg b.i.d verzenio on 11/5/22. repeat CBC in 2 weeks. prior symptoms improved 1 week off of letrazole . switch to exemestane. check left breast ultrasound r/o left axilla seroma. pt continues to use compression sleeve to LUE lymphedema.  \par \par # vit d \par cont vit d \par \par # osteopenia \par up to date aug 2022\par \par #anxiety \par xanax as needed \par emotional support given \par \par #splenic lesion\par Incidental finding CT c/a/p - few lesions largest measuring 1.5cm.\par  repeat CTA 08/2022 show stable in appearance- 1.2cm\par has been stable since 2020 \par \par #insomnia\par likely r/t to steroids and letrazole \par consider trazadone. \par \par #LLE swelling\par 11/1/22- LLE duplex- no evidence of DVT \par lymphedema?\par \par Dw patient at length\par follow up in 2 weeks. \par \par \par

## 2022-11-03 NOTE — REVIEW OF SYSTEMS
[Recent Change In Weight] : ~T recent weight change [SOB on Exertion] : shortness of breath during exertion [Insomnia] : insomnia [Anxiety] : anxiety [Depression] : depression [Hot Flashes] : hot flashes [Negative] : Integumentary [Fever] : no fever [Chills] : no chills [Night Sweats] : no night sweats [Fatigue] : no fatigue [Shortness Of Breath] : no shortness of breath [Wheezing] : no wheezing [Cough] : no cough [Abdominal Pain] : no abdominal pain [Vomiting] : no vomiting [Constipation] : no constipation [Diarrhea] : no diarrhea [Joint Pain] : no joint pain [Joint Stiffness] : no joint stiffness [Muscle Pain] : no muscle pain [Muscle Weakness] : no muscle weakness [Difficulty Walking] : no difficulty walking [FreeTextEntry2] : gaining weight. fatigue improving  [FreeTextEntry3] : sometimes with left eye blurriness- seldom [FreeTextEntry6] : mild SOB  [FreeTextEntry9] : improving joint pain.  [de-identified] : had HA . does not happen often.  [de-identified] : see above

## 2022-11-03 NOTE — HISTORY OF PRESENT ILLNESS
[de-identified] : This is a 67-year-old postmenopausal woman presenting with a new diagnosis of breast cancer\par Patient goes for regular routine screening mammograms and was initally  found to have architectural distortion at the right breast 12 o'clock position, ultrasound revealing for a 5 mm density she underwent a right breast  biopsy at [facility]  on 3/2019.  which showed atypical ductal hyperplasia and then underwent wide excision in March 2019 showing a radial sclerosing lesion but no further atypia or malignancy. Dr juarez. \par ? chemo prevention ? \par  Patient follows with Dr. Jessica Hi due to the high risk status and goes for regular mammo and MRI \par \par Routine screening mammogram on October 26, 2021 showed postsurgical changes right breast but there was a new ill-defined spiculated mass left breast 7 o'clock position measuring 2 cm there is also an enlarged left axillary lymph node measuring 4.4 x 1.3 cm ( this appeared benign  was not seen on  MRI) \par \par patient underwent a left breast ultrasound-guided core biopsy on November 5, 2021  which revealed an intermediate grade invasive  ductal carcinoma ER/WI strongly +95 to 100% HER-2/merrill negative Ki-67 between 40 and 50%.\par \par MRI performed on November 29, 2021 revealing for a biopsy-proven malignancy in her left breast up to 2.1 cm with suspicious linear nonmasslike enhancement extending to the anterior inferior index mass total suspicious area of enhancement is 3.3 cm also linear non-mass enhancement left breast at the central portion of the breast MRI guided biopsy was recommended as well suspicious nonletter masslike enhancement in the inner right breast measuring 4.7 cm MRI guided biopsy was recommended.\par \par MRI guided biopsy performed on December 9, 2021 revealing for the following left breast non-mass-like enhancement showed usual ductal hyperplasia\par \par Right breast non-mass-like enhancement lower inner quadrant revealed grade 2 invasive lobular carcinoma grade 2 out of 3 invasive carcinoma was present in multiple cores up to 12 mm LCIS was also present and focal atypical ductal hyperplasia\par Tumor was ER +80% WI +40% HER-2/merrill negative Ki-67 25%.\par \par Await genetic testing , monday.  9 genes\par Daughter is negative for BRCA \par \par Patient has extensive family history of cancer including a mother who had colon cancer a paternal grandmother who had breast cancer maternal cousin who had ovarian cancer and then breast cancer.  2 paternal cousins who had breast cancer her sister had a phyllodes \par \par Status post bilateral partial mastectomies with sentinel lymph node biopsy on January 21, 2022 with Dr. Jessica Hi.\par \par Right side-final pathological staging PT2N0(i) \par -Largest tumor 5 cm classic invasive lobular smaller tumor measuring 4 mm\par -Grade 2, no lymphovascular invasion\par -Margin less than 1 mm inferior\par -1 out of 2 lymph nodes revealing for isolated tumor cells\par -ER WI positive both at 99% HER-2 negative Ki-67 8%\par \par Left side - final path T2N1a\par Largest tumor 2.7 cm with satellite lesions measuring 1 mm and 2 mm invasive ductal carcinoma\par -Grade 2 no lymphovascular invasion\par 2 lymph nodes positive for macro metastases measuring 2.4 cm with extranodal extension\par 1 lymph node positive for micro met for a total of 3 out of 4 lymph nodes positive\par ER/WI +95% and 99%, HER-2 negative Ki-67 25%\par Margins were also close as well.\par \par Oncotype DX score on the left was low risk at 17 equating to a 15% risk of distant recurrence.\par Oncotype DX score on the right was low risk at 10 with a 3% risk of distant recurrence over the next 10 years.\par \par CT of the chest abdomen pelvis show hypoattenuation lesion in the spleen measuring 1.5 cm but no evidence of metastatic disease\par Bone scan negative for any evidence of metastatic disease.\par \par \par \par \par \par \par \par  [de-identified] : took week off of letrazole- symptoms improving. \par left arm lymphedema. left breast feels swollen. Left leg  swollen , not worse \par 11/1/22- LLE duplex- no evidence of DVT

## 2022-11-03 NOTE — PHYSICAL EXAM
[Fully active, able to carry on all pre-disease performance without restriction] : Status 0 - Fully active, able to carry on all pre-disease performance without restriction [Normal] : affect appropriate [de-identified] : b/l breast s/p partial mastectomy and expanders in place. no palpable masses nor lesions. left axilla with palpable nodule, possible seroma?. mild pink discoloration.  [de-identified] : did not examine [de-identified] : LUE and LLE lymphedema. wears compression sleeve to LUE

## 2022-11-07 ENCOUNTER — RESULT REVIEW (OUTPATIENT)
Age: 67
End: 2022-11-07

## 2022-11-09 ENCOUNTER — NON-APPOINTMENT (OUTPATIENT)
Age: 67
End: 2022-11-09

## 2022-11-10 ENCOUNTER — NON-APPOINTMENT (OUTPATIENT)
Age: 67
End: 2022-11-10

## 2022-11-29 ENCOUNTER — RESULT REVIEW (OUTPATIENT)
Age: 67
End: 2022-11-29

## 2022-11-30 ENCOUNTER — RESULT REVIEW (OUTPATIENT)
Age: 67
End: 2022-11-30

## 2022-11-30 ENCOUNTER — APPOINTMENT (OUTPATIENT)
Dept: HEMATOLOGY ONCOLOGY | Facility: CLINIC | Age: 67
End: 2022-11-30

## 2022-11-30 VITALS
HEIGHT: 69 IN | TEMPERATURE: 98 F | RESPIRATION RATE: 18 BRPM | DIASTOLIC BLOOD PRESSURE: 96 MMHG | OXYGEN SATURATION: 96 % | SYSTOLIC BLOOD PRESSURE: 178 MMHG | HEART RATE: 91 BPM

## 2022-11-30 DIAGNOSIS — M79.89 OTHER SPECIFIED SOFT TISSUE DISORDERS: ICD-10-CM

## 2022-11-30 DIAGNOSIS — R14.0 ABDOMINAL DISTENSION (GASEOUS): ICD-10-CM

## 2022-11-30 PROCEDURE — 99214 OFFICE O/P EST MOD 30 MIN: CPT | Mod: 25

## 2022-11-30 PROCEDURE — 36415 COLL VENOUS BLD VENIPUNCTURE: CPT

## 2022-11-30 RX ORDER — PREDNISONE 20 MG/1
20 TABLET ORAL
Qty: 21 | Refills: 0 | Status: DISCONTINUED | COMMUNITY
Start: 2022-08-25

## 2022-11-30 NOTE — PHYSICAL EXAM
[Fully active, able to carry on all pre-disease performance without restriction] : Status 0 - Fully active, able to carry on all pre-disease performance without restriction [Normal] : affect appropriate [de-identified] : b/l breast s/p partial mastectomy and expanders in place. no palpable masses nor lesions. left axilla with palpable nodule, possible seroma?. mild pink discoloration.  [de-identified] : distention  [de-identified] : LUE and LLE lymphedema. wears compression sleeve to LUE

## 2022-11-30 NOTE — HISTORY OF PRESENT ILLNESS
[de-identified] : This is a 67-year-old postmenopausal woman presenting with a new diagnosis of breast cancer\par Patient goes for regular routine screening mammograms and was initally  found to have architectural distortion at the right breast 12 o'clock position, ultrasound revealing for a 5 mm density she underwent a right breast  biopsy at [facility]  on 3/2019.  which showed atypical ductal hyperplasia and then underwent wide excision in March 2019 showing a radial sclerosing lesion but no further atypia or malignancy. Dr juarez. \par ? chemo prevention ? \par  Patient follows with Dr. Jessica Hi due to the high risk status and goes for regular mammo and MRI \par \par Routine screening mammogram on October 26, 2021 showed postsurgical changes right breast but there was a new ill-defined spiculated mass left breast 7 o'clock position measuring 2 cm there is also an enlarged left axillary lymph node measuring 4.4 x 1.3 cm ( this appeared benign  was not seen on  MRI) \par \par patient underwent a left breast ultrasound-guided core biopsy on November 5, 2021  which revealed an intermediate grade invasive  ductal carcinoma ER/WA strongly +95 to 100% HER-2/merrill negative Ki-67 between 40 and 50%.\par \par MRI performed on November 29, 2021 revealing for a biopsy-proven malignancy in her left breast up to 2.1 cm with suspicious linear nonmasslike enhancement extending to the anterior inferior index mass total suspicious area of enhancement is 3.3 cm also linear non-mass enhancement left breast at the central portion of the breast MRI guided biopsy was recommended as well suspicious nonletter masslike enhancement in the inner right breast measuring 4.7 cm MRI guided biopsy was recommended.\par \par MRI guided biopsy performed on December 9, 2021 revealing for the following left breast non-mass-like enhancement showed usual ductal hyperplasia\par \par Right breast non-mass-like enhancement lower inner quadrant revealed grade 2 invasive lobular carcinoma grade 2 out of 3 invasive carcinoma was present in multiple cores up to 12 mm LCIS was also present and focal atypical ductal hyperplasia\par Tumor was ER +80% WA +40% HER-2/merrill negative Ki-67 25%.\par \par Await genetic testing , monday.  9 genes\par Daughter is negative for BRCA \par \par Patient has extensive family history of cancer including a mother who had colon cancer a paternal grandmother who had breast cancer maternal cousin who had ovarian cancer and then breast cancer.  2 paternal cousins who had breast cancer her sister had a phyllodes \par \par Status post bilateral partial mastectomies with sentinel lymph node biopsy on January 21, 2022 with Dr. Jessica Hi.\par \par Right side-final pathological staging PT2N0(i) \par -Largest tumor 5 cm classic invasive lobular smaller tumor measuring 4 mm\par -Grade 2, no lymphovascular invasion\par -Margin less than 1 mm inferior\par -1 out of 2 lymph nodes revealing for isolated tumor cells\par -ER WA positive both at 99% HER-2 negative Ki-67 8%\par \par Left side - final path T2N1a\par Largest tumor 2.7 cm with satellite lesions measuring 1 mm and 2 mm invasive ductal carcinoma\par -Grade 2 no lymphovascular invasion\par 2 lymph nodes positive for macro metastases measuring 2.4 cm with extranodal extension\par 1 lymph node positive for micro met for a total of 3 out of 4 lymph nodes positive\par ER/WA +95% and 99%, HER-2 negative Ki-67 25%\par Margins were also close as well.\par \par Oncotype DX score on the left was low risk at 17 equating to a 15% risk of distant recurrence.\par Oncotype DX score on the right was low risk at 10 with a 3% risk of distant recurrence over the next 10 years.\par \par CT of the chest abdomen pelvis show hypoattenuation lesion in the spleen measuring 1.5 cm but no evidence of metastatic disease\par Bone scan negative for any evidence of metastatic disease.\par \par \par \par \par \par \par \par  [de-identified] : Patient seen and examined today for follow up. \par She remains on Verzenio/Examestane. Stopped letrozole. Started examestane 11/15/22. \par She continues to follow with Dr. Teixeira. Last seen 8/22.\par Of note, at last visit recommended US L breast to r/o seroma. US done 11/6/22 revealing no evidence of malignancy. No fluid collection. \par Patient also has hx of LUE lymphedema, wears compression sleeve. Going to PT at [place]. \par insomnia tolerable. Takes melatonin. \par diarrhea much better with 100mg Verzenio. Does not need to use Imodium. About 3 bowel movements a day. Semi formed/loose.\par LLE swelling. 11/1/22- LLE duplex- no evidence of DVT. \par \par Mammo: s/p b/l mastectomy no need for further imaging\par DEXA: 8/2022 \par GYN: does not see \par Colonoscopy: due. Has GI Dr. Chinchilla.

## 2022-11-30 NOTE — REVIEW OF SYSTEMS
[Recent Change In Weight] : ~T recent weight change [SOB on Exertion] : shortness of breath during exertion [Insomnia] : insomnia [Anxiety] : anxiety [Depression] : depression [Hot Flashes] : hot flashes [Negative] : Allergic/Immunologic [FreeTextEntry2] : gaining weight. fatigue improving  [de-identified] : had HA . does not happen often.  [de-identified] : see above  [Fatigue] : fatigue [Cough] : cough [Diarrhea] : diarrhea [Joint Pain] : joint pain [Fever] : no fever [Chills] : no chills [Night Sweats] : no night sweats [Shortness Of Breath] : no shortness of breath [Wheezing] : no wheezing [Abdominal Pain] : no abdominal pain [Vomiting] : no vomiting [Constipation] : no constipation [Joint Stiffness] : no joint stiffness [Muscle Pain] : no muscle pain [Muscle Weakness] : no muscle weakness [Difficulty Walking] : no difficulty walking [FreeTextEntry3] : wears  [FreeTextEntry6] : sinus issues  [FreeTextEntry9] : R knee joint pain  [de-identified] : insomnia takes melatonin

## 2022-11-30 NOTE — ASSESSMENT
[FreeTextEntry1] : This is a 67-year-old postmenopausal woman presenting with bilateral breast cancer.\par Routine screening mammogram revealing for new abnormality on the left biopsy revealing for invasive ductal carcinoma ER/NE strongly positive HER-2/merrill negative.  Initially there was an enlarged lymph node but this was thought to be negative due to appearance and the lack of visualization on MRI.  Then on MRI in November 2021 patient was found to have a contralateral breast mass on the right measuring 4.7 cm biopsy was revealing for invasive lobular carcinoma grade 2 ER/NE positive HER-2/merrill negative.  She is now planned for bilateral lumpectomies.\par \par # bilateral breast cancer \par - the right is in T2N 0 (i) with a 5 cm invasive lobular carcinoma and 2 lymph nodes positive for isolated tumor cells.  ER/NE strongly positive at 99% respectively HER-2 negative Ki-67 8%, grade 2 , no LVI \par -The left side is positive T2N1a for a 2.7 cm invasive ductal carcinoma grade 2 no lymphovascular invasion  2 macrometastases to the lymph node and 1 micrometastases for a grand total of 3 out of 4 this tumor is also ER/NE positive strongly at 95 and 99% respectively but the Ki-67 is higher at 25%\par -Oncotype DX score was reviewed today at length with the patient and her daughter on the left the score is low at 17 which equates to a 15% risk of distant recurrence in the next 10 years on the right the scores also low risk at 10 with a 3% risk of distant recurrence over the next 10 years.\par -Because both Oncotype's are low risk this would indicate that based on the molecular biology there would be no significant benefit to giving chemotherapy to reduce the risk of recurrence.  However in looking at the clinical risks of both cancers the left with the high Ki-67 and the 3+ lymph nodes clinically the patient is high risk.\par -Reviewed with patient that both MammaPrint sent on both the right and left cancers results also came back low risk a consistent with a lack of chemotherapy benefit.\par -Explained the difference between molecular phenotype low risk score and how these both can be incorporated.\par -Reviewed the final pathology from the bilateral mastectomy which shows residual cancer on the right and on the left there are no additional positive lymph nodes.  Therefore the final staging as was determined by the lumpectomy specimens is unchanged.\par -Again reviewed that in order for the molecular studies to be valid especially the Oncotype have to consider the Rxsponder trial and the enrollment criteria which included 3 or less positive lymph nodes.\par -Because the additional lymph node dissection did not show any additional positive lymph nodes the patient falls within this criteria.However I did explain that given the 2 primaries and especially given that the right side was over 5 cm she would considered a higher risk side clinically especially given that the left sided breast cancer was lymph node positive. Could consider giving chemotherapy in this situation however given that both Oncotype and MammaPrint would argue that there is no benefit to chemotherapy based on the molecular phenotype this must be taken into consideration.  Prior to molecular studies it would have been standard to give chemotherapy however now we know based on the molecular phenotype that chemotherapy may not be beneficial in this setting and that hormone therapy may be more beneficial.  In addition now we have the option of giving a CK4 6 inhibitor in the adjuvant setting for high risk patients that I believe this patient would fall into given the high Ki-67 and the large size and multiple positive lymph nodes.\par -we mutually decided to forgo chemotherapy and proceed with endocrine therapy \par -We discussed hormonal therapy at length including the mechanism efficacy and side effects.  We decided to go with letrozole, discussed the side effects of joint pains hot flashes and bone thinning.\par -In addition we discussed Verzenio, and the Truli E data.  We discussed getting some efficacy and side effects including the risk of cytopenias, GI side effects, fibrosis.  \par - CT and Bone Scan negative for mets\par - She is s/p completion of radiation \par - Started Letrozole 5/2022- tolerated fairly well in the beginning. Now with persistent debilitating sxs, joint pain, weight gain, hot flashes. Recommended to hold for 1 week to see if symptoms resolve. \par -Verzenio started 7/2022. She is s/p ER visit for SOB, rash, congestion, and leg swelling. Records reviewed. Likely r/t to verzenio. 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 S/p 40mg prednisone with taper 08/26/22 and s/p completion of steroids on 9/30/22. Repeat CT chest 10/05/22 shows stable pneumonitis. Will consider lowering dose due to prior toxicity 100mg verzenio B.I.D. \par - Pt will start 100mg b.i.d verzenio on 11/5/22\par - Prior symptoms improved 1 week off of Letrozole. Will switch to Exemestane, started 11/2/22. \par - s/p L breast US to r/o seroma, revealing no evidence of malignancy, no fluid collection. pt continues to use \par - left axilla cording\par - abdominal distention - CT scan\par - LLE - edema, repeat Doppler\par \par #LUE lymphedema \par continue compression sleeve \par \par # vit d \par cont vit d \par \par # osteopenia \par up to date aug 2022\par \par #anxiety \par xanax as needed \par emotional support given \par \par #splenic lesion\par Incidental finding CT c/a/p - few lesions largest measuring 1.5cm.\par  repeat CTA 08/2022 show stable in appearance- 1.2cm\par has been stable since 2020 \par \par #insomnia\par likely r/t to steroids and letrozole \par consider trazodone. \par \par #LLE swelling\par 11/1/22- LLE duplex- no evidence of DVT \par lymphedema?\par \par RTO in \par

## 2022-12-28 ENCOUNTER — RESULT REVIEW (OUTPATIENT)
Age: 67
End: 2022-12-28

## 2022-12-28 ENCOUNTER — APPOINTMENT (OUTPATIENT)
Dept: HEMATOLOGY ONCOLOGY | Facility: CLINIC | Age: 67
End: 2022-12-28
Payer: MEDICARE

## 2022-12-28 VITALS
HEART RATE: 99 BPM | SYSTOLIC BLOOD PRESSURE: 163 MMHG | HEIGHT: 69 IN | BODY MASS INDEX: 33.47 KG/M2 | RESPIRATION RATE: 18 BRPM | DIASTOLIC BLOOD PRESSURE: 85 MMHG | TEMPERATURE: 97.4 F | WEIGHT: 226 LBS | OXYGEN SATURATION: 99 %

## 2022-12-28 PROCEDURE — 99213 OFFICE O/P EST LOW 20 MIN: CPT

## 2022-12-30 LAB
25(OH)D3 SERPL-MCNC: 53.1 NG/ML
CANCER AG15-3 SERPL-ACNC: 38.7 U/ML
CEA SERPL-MCNC: 2.9 NG/ML

## 2022-12-30 NOTE — HISTORY OF PRESENT ILLNESS
[de-identified] : This is a 67-year-old postmenopausal woman presenting with a new diagnosis of breast cancer\par Patient goes for regular routine screening mammograms and was initally  found to have architectural distortion at the right breast 12 o'clock position, ultrasound revealing for a 5 mm density she underwent a right breast  biopsy at [facility]  on 3/2019.  which showed atypical ductal hyperplasia and then underwent wide excision in March 2019 showing a radial sclerosing lesion but no further atypia or malignancy. Dr juarez. \par ? chemo prevention ? \par  Patient follows with Dr. Jessica Hi due to the high risk status and goes for regular mammo and MRI \par \par Routine screening mammogram on October 26, 2021 showed postsurgical changes right breast but there was a new ill-defined spiculated mass left breast 7 o'clock position measuring 2 cm there is also an enlarged left axillary lymph node measuring 4.4 x 1.3 cm ( this appeared benign  was not seen on  MRI) \par \par patient underwent a left breast ultrasound-guided core biopsy on November 5, 2021  which revealed an intermediate grade invasive  ductal carcinoma ER/GA strongly +95 to 100% HER-2/merrill negative Ki-67 between 40 and 50%.\par \par MRI performed on November 29, 2021 revealing for a biopsy-proven malignancy in her left breast up to 2.1 cm with suspicious linear nonmasslike enhancement extending to the anterior inferior index mass total suspicious area of enhancement is 3.3 cm also linear non-mass enhancement left breast at the central portion of the breast MRI guided biopsy was recommended as well suspicious nonletter masslike enhancement in the inner right breast measuring 4.7 cm MRI guided biopsy was recommended.\par \par MRI guided biopsy performed on December 9, 2021 revealing for the following left breast non-mass-like enhancement showed usual ductal hyperplasia\par \par Right breast non-mass-like enhancement lower inner quadrant revealed grade 2 invasive lobular carcinoma grade 2 out of 3 invasive carcinoma was present in multiple cores up to 12 mm LCIS was also present and focal atypical ductal hyperplasia\par Tumor was ER +80% GA +40% HER-2/merrill negative Ki-67 25%.\par \par Await genetic testing , monday.  9 genes\par Daughter is negative for BRCA \par \par Patient has extensive family history of cancer including a mother who had colon cancer a paternal grandmother who had breast cancer maternal cousin who had ovarian cancer and then breast cancer.  2 paternal cousins who had breast cancer her sister had a phyllodes \par \par Status post bilateral partial mastectomies with sentinel lymph node biopsy on January 21, 2022 with Dr. Jessica Hi.\par \par Right side-final pathological staging PT2N0(i) \par -Largest tumor 5 cm classic invasive lobular smaller tumor measuring 4 mm\par -Grade 2, no lymphovascular invasion\par -Margin less than 1 mm inferior\par -1 out of 2 lymph nodes revealing for isolated tumor cells\par -ER GA positive both at 99% HER-2 negative Ki-67 8%\par \par Left side - final path T2N1a\par Largest tumor 2.7 cm with satellite lesions measuring 1 mm and 2 mm invasive ductal carcinoma\par -Grade 2 no lymphovascular invasion\par 2 lymph nodes positive for macro metastases measuring 2.4 cm with extranodal extension\par 1 lymph node positive for micro met for a total of 3 out of 4 lymph nodes positive\par ER/GA +95% and 99%, HER-2 negative Ki-67 25%\par Margins were also close as well.\par \par Oncotype DX score on the left was low risk at 17 equating to a 15% risk of distant recurrence.\par Oncotype DX score on the right was low risk at 10 with a 3% risk of distant recurrence over the next 10 years.\par \par CT of the chest abdomen pelvis show hypoattenuation lesion in the spleen measuring 1.5 cm but no evidence of metastatic disease\par Bone scan negative for any evidence of metastatic disease.\par \par \par \par \par \par \par \par  [de-identified] : Patient seen and examined today for follow up. \par She remains on Verzenio/Examestane. Stopped letrozole. Started examestane 11/15/22. \par She continues to follow with Dr. Teixeira. Last seen 8/22.\par Patient also has hx of LUE lymphedema, wears compression sleeve. Going to PT at [place] with moderate improvement\par ortho appt for knee \par vit D 2000iu. once a week. \par seeing plastics.  1/9/23\par [facility]- needs cardiologist. \par Mammo: s/p b/l mastectomy no need for further imaging\par DEXA: 8/2022 \par GYN: does not see \par Colonoscopy: due. Has GI Dr. Chinchilla.

## 2022-12-30 NOTE — REVIEW OF SYSTEMS
[Fatigue] : fatigue [SOB on Exertion] : shortness of breath during exertion [Diarrhea] : diarrhea [Joint Pain] : joint pain [Insomnia] : insomnia [Anxiety] : anxiety [Depression] : depression [Hot Flashes] : hot flashes [Negative] : Allergic/Immunologic [Recent Change In Weight] : ~T recent weight change [Fever] : no fever [Chills] : no chills [Night Sweats] : no night sweats [Shortness Of Breath] : no shortness of breath [Wheezing] : no wheezing [Cough] : no cough [Abdominal Pain] : no abdominal pain [Vomiting] : no vomiting [Constipation] : no constipation [Joint Stiffness] : no joint stiffness [Muscle Pain] : no muscle pain [Muscle Weakness] : no muscle weakness [Difficulty Walking] : no difficulty walking [FreeTextEntry3] : wears glasses [FreeTextEntry6] : sinus issues  [FreeTextEntry7] : see interval hx. pt still c/o of bloating- has not done CT abd/pelvis. was not sure if it was authorized.  [FreeTextEntry9] : R knee joint pain  [de-identified] : insomnia takes melatonin

## 2022-12-30 NOTE — PHYSICAL EXAM
[Fully active, able to carry on all pre-disease performance without restriction] : Status 0 - Fully active, able to carry on all pre-disease performance without restriction [Normal] : affect appropriate [de-identified] : b/l breast s/p partial mastectomy and expanders in place. no palpable masses nor lesions. left axilla with cording.   [de-identified] : distention  [de-identified] : LUE and LLE lymphedema. wears compression sleeve to LUE

## 2022-12-30 NOTE — ASSESSMENT
[FreeTextEntry1] : This is a 67-year-old postmenopausal woman presenting with bilateral breast cancer.\par Routine screening mammogram revealing for new abnormality on the left biopsy revealing for invasive ductal carcinoma ER/DC strongly positive HER-2/merrill negative.  Initially there was an enlarged lymph node but this was thought to be negative due to appearance and the lack of visualization on MRI.  Then on MRI in November 2021 patient was found to have a contralateral breast mass on the right measuring 4.7 cm biopsy was revealing for invasive lobular carcinoma grade 2 ER/DC positive HER-2/merrill negative.  She is now planned for bilateral lumpectomies.\par \par # bilateral breast cancer \par - the right is in T2N 0 (i) with a 5 cm invasive lobular carcinoma and 2 lymph nodes positive for isolated tumor cells.  ER/DC strongly positive at 99% respectively HER-2 negative Ki-67 8%, grade 2 , no LVI \par -The left side is positive T2N1a for a 2.7 cm invasive ductal carcinoma grade 2 no lymphovascular invasion  2 macrometastases to the lymph node and 1 micrometastases for a grand total of 3 out of 4 this tumor is also ER/DC positive strongly at 95 and 99% respectively but the Ki-67 is higher at 25%\par -Oncotype DX score was reviewed today at length with the patient and her daughter on the left the score is low at 17 which equates to a 15% risk of distant recurrence in the next 10 years on the right the scores also low risk at 10 with a 3% risk of distant recurrence over the next 10 years.\par -Because both Oncotype's are low risk this would indicate that based on the molecular biology there would be no significant benefit to giving chemotherapy to reduce the risk of recurrence.  However in looking at the clinical risks of both cancers the left with the high Ki-67 and the 3+ lymph nodes clinically the patient is high risk.\par -Reviewed with patient that both MammaPrint sent on both the right and left cancers results also came back low risk a consistent with a lack of chemotherapy benefit.\par -Explained the difference between molecular phenotype low risk score and how these both can be incorporated.\par -Reviewed the final pathology from the bilateral mastectomy which shows residual cancer on the right and on the left there are no additional positive lymph nodes.  Therefore the final staging as was determined by the lumpectomy specimens is unchanged.\par -Again reviewed that in order for the molecular studies to be valid especially the Oncotype have to consider the Rxsponder trial and the enrollment criteria which included 3 or less positive lymph nodes.\par -Because the additional lymph node dissection did not show any additional positive lymph nodes the patient falls within this criteria.However I did explain that given the 2 primaries and especially given that the right side was over 5 cm she would considered a higher risk side clinically especially given that the left sided breast cancer was lymph node positive. Could consider giving chemotherapy in this situation however given that both Oncotype and MammaPrint would argue that there is no benefit to chemotherapy based on the molecular phenotype this must be taken into consideration.  Prior to molecular studies it would have been standard to give chemotherapy however now we know based on the molecular phenotype that chemotherapy may not be beneficial in this setting and that hormone therapy may be more beneficial.  In addition now we have the option of giving a CK4 6 inhibitor in the adjuvant setting for high risk patients that I believe this patient would fall into given the high Ki-67 and the large size and multiple positive lymph nodes.\par -we mutually decided to forgo chemotherapy and proceed with endocrine therapy \par -We discussed hormonal therapy at length including the mechanism efficacy and side effects.  We decided to go with letrozole, discussed the side effects of joint pains hot flashes and bone thinning.\par -In addition we discussed Verzenio, and the Populis E data.  We discussed getting some efficacy and side effects including the risk of cytopenias, GI side effects, fibrosis.  \par - CT and Bone Scan negative for mets\par - She is s/p completion of radiation \par - Started Letrozole 5/2022- tolerated fairly well in the beginning. Now with persistent debilitating sxs, joint pain, weight gain, hot flashes. Recommended to hold for 1 week to see if symptoms resolve. \par -Verzenio started 7/2022. She is s/p ER visit for SOB, rash, congestion, and leg swelling. Records reviewed. Likely r/t to verzenio. 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 S/p 40mg prednisone with taper 08/26/22 and s/p completion of steroids on 9/30/22. Repeat CT chest 10/05/22 shows stable pneumonitis. Will consider lowering dose due to prior toxicity 100mg verzenio B.I.D. \par - Pt will start 100mg b.i.d verzenio on 11/5/22\par - Prior symptoms improved 1 week off of Letrozole. Will switch to Exemestane, started 11/2/22. \par - s/p L breast US to r/o seroma, revealing no evidence of malignancy, no fluid collection. pt continues to use \par - left axilla cording- improvement with PT at [place]. \par - abdominal distention - CT scan. will check for auth- pt will make appt \par - LLE - edema, Oct 2022 doppler negative for DVT. recommend compression sleeve and PT \par -pt has appt to see plastics ( Dr. Patricio) on Jan 9 , 2023\par \par #LUE lymphedema \par continue compression sleeve \par PT at [place] \par \par # vit d \par cont vit d \par \par # osteopenia \par up to date aug 2022\par \par #anxiety \par xanax as needed \par emotional support given \par \par #splenic lesion\par Incidental finding CT c/a/p - few lesions largest measuring 1.5cm.\par  repeat CTA 08/2022 show stable in appearance- 1.2cm\par has been stable since 2020 \par \par #insomnia\par likely r/t to steroids and letrozole \par consider trazodone. \par \par #LLE swelling\par 11/1/22- LLE duplex- no evidence of DVT \par lymphedema\par consider PT \par \par follow up in 1 month \par

## 2023-01-25 ENCOUNTER — APPOINTMENT (OUTPATIENT)
Dept: HEMATOLOGY ONCOLOGY | Facility: CLINIC | Age: 68
End: 2023-01-25
Payer: MEDICARE

## 2023-01-25 ENCOUNTER — RESULT REVIEW (OUTPATIENT)
Age: 68
End: 2023-01-25

## 2023-01-25 VITALS
OXYGEN SATURATION: 98 % | WEIGHT: 220 LBS | TEMPERATURE: 97.8 F | HEART RATE: 86 BPM | BODY MASS INDEX: 32.58 KG/M2 | SYSTOLIC BLOOD PRESSURE: 148 MMHG | DIASTOLIC BLOOD PRESSURE: 83 MMHG | HEIGHT: 69 IN | RESPIRATION RATE: 18 BRPM

## 2023-01-25 PROCEDURE — 99213 OFFICE O/P EST LOW 20 MIN: CPT

## 2023-01-25 NOTE — PHYSICAL EXAM
[Fully active, able to carry on all pre-disease performance without restriction] : Status 0 - Fully active, able to carry on all pre-disease performance without restriction [Normal] : affect appropriate [de-identified] : b/l breast s/p partial mastectomy and expanders in place. no palpable masses nor lesions. left axilla with cording.   [de-identified] : moderate distention . normoactive bowel sounds. no palpable tenderness . no guarding upon percussion [de-identified] : LUE and LLE lymphedema. wears compression sleeve to LUE  [de-identified] : dry skin. cracked in fingertips . no open wounds.

## 2023-01-25 NOTE — HISTORY OF PRESENT ILLNESS
[de-identified] : This is a 67-year-old postmenopausal woman presenting with a new diagnosis of breast cancer\par Patient goes for regular routine screening mammograms and was initally  found to have architectural distortion at the right breast 12 o'clock position, ultrasound revealing for a 5 mm density she underwent a right breast  biopsy at [facility]  on 3/2019.  which showed atypical ductal hyperplasia and then underwent wide excision in March 2019 showing a radial sclerosing lesion but no further atypia or malignancy. Dr juarez. \par ? chemo prevention ? \par  Patient follows with Dr. Jessica Hi due to the high risk status and goes for regular mammo and MRI \par \par Routine screening mammogram on October 26, 2021 showed postsurgical changes right breast but there was a new ill-defined spiculated mass left breast 7 o'clock position measuring 2 cm there is also an enlarged left axillary lymph node measuring 4.4 x 1.3 cm ( this appeared benign  was not seen on  MRI) \par \par patient underwent a left breast ultrasound-guided core biopsy on November 5, 2021  which revealed an intermediate grade invasive  ductal carcinoma ER/MN strongly +95 to 100% HER-2/merrill negative Ki-67 between 40 and 50%.\par \par MRI performed on November 29, 2021 revealing for a biopsy-proven malignancy in her left breast up to 2.1 cm with suspicious linear nonmasslike enhancement extending to the anterior inferior index mass total suspicious area of enhancement is 3.3 cm also linear non-mass enhancement left breast at the central portion of the breast MRI guided biopsy was recommended as well suspicious nonletter masslike enhancement in the inner right breast measuring 4.7 cm MRI guided biopsy was recommended.\par \par MRI guided biopsy performed on December 9, 2021 revealing for the following left breast non-mass-like enhancement showed usual ductal hyperplasia\par \par Right breast non-mass-like enhancement lower inner quadrant revealed grade 2 invasive lobular carcinoma grade 2 out of 3 invasive carcinoma was present in multiple cores up to 12 mm LCIS was also present and focal atypical ductal hyperplasia\par Tumor was ER +80% MN +40% HER-2/merrill negative Ki-67 25%.\par \par Await genetic testing , monday.  9 genes\par Daughter is negative for BRCA \par \par Patient has extensive family history of cancer including a mother who had colon cancer a paternal grandmother who had breast cancer maternal cousin who had ovarian cancer and then breast cancer.  2 paternal cousins who had breast cancer her sister had a phyllodes \par \par Status post bilateral partial mastectomies with sentinel lymph node biopsy on January 21, 2022 with Dr. Jessica Hi.\par \par Right side-final pathological staging PT2N0(i) \par -Largest tumor 5 cm classic invasive lobular smaller tumor measuring 4 mm\par -Grade 2, no lymphovascular invasion\par -Margin less than 1 mm inferior\par -1 out of 2 lymph nodes revealing for isolated tumor cells\par -ER MN positive both at 99% HER-2 negative Ki-67 8%\par \par Left side - final path T2N1a\par Largest tumor 2.7 cm with satellite lesions measuring 1 mm and 2 mm invasive ductal carcinoma\par -Grade 2 no lymphovascular invasion\par 2 lymph nodes positive for macro metastases measuring 2.4 cm with extranodal extension\par 1 lymph node positive for micro met for a total of 3 out of 4 lymph nodes positive\par ER/MN +95% and 99%, HER-2 negative Ki-67 25%\par Margins were also close as well.\par \par Oncotype DX score on the left was low risk at 17 equating to a 15% risk of distant recurrence.\par Oncotype DX score on the right was low risk at 10 with a 3% risk of distant recurrence over the next 10 years.\par \par CT of the chest abdomen pelvis show hypoattenuation lesion in the spleen measuring 1.5 cm but no evidence of metastatic disease\par Bone scan negative for any evidence of metastatic disease.\par \par \par \par \par \par \par \par  [de-identified] : Patient seen and examined today for follow up. \par She remains on Verzenio/Examestane. re\par She continues to follow with Dr. Teixeira. Last seen 8/22.\par Patient also has hx of LUE lymphedema, wears compression sleeve- has to be re-fitted. sees PT at [place] \par vit D 2000iu. continues to take once a week\par saw Dr. Patricio-plan for breast revision end of March 2023. \par received cortisone injection for left knee feels better. \par scheduled for CT abd/pelvis at [facility] on jan 30, 2023. \par Colonoscopy: due. Has GI Dr. Chinchilla.\par gyn does not see \par

## 2023-01-25 NOTE — ASSESSMENT
[FreeTextEntry1] : This is a 67-year-old postmenopausal woman presenting with bilateral breast cancer.\par Routine screening mammogram revealing for new abnormality on the left biopsy revealing for invasive ductal carcinoma ER/ND strongly positive HER-2/merrill negative.  Initially there was an enlarged lymph node but this was thought to be negative due to appearance and the lack of visualization on MRI.  Then on MRI in November 2021 patient was found to have a contralateral breast mass on the right measuring 4.7 cm biopsy was revealing for invasive lobular carcinoma grade 2 ER/ND positive HER-2/merrill negative.  She is now planned for bilateral lumpectomies.\par \par # bilateral breast cancer \par - the right is in T2N 0 (i) with a 5 cm invasive lobular carcinoma and 2 lymph nodes positive for isolated tumor cells.  ER/ND strongly positive at 99% respectively HER-2 negative Ki-67 8%, grade 2 , no LVI \par -The left side is positive T2N1a for a 2.7 cm invasive ductal carcinoma grade 2 no lymphovascular invasion  2 macrometastases to the lymph node and 1 micrometastases for a grand total of 3 out of 4 this tumor is also ER/ND positive strongly at 95 and 99% respectively but the Ki-67 is higher at 25%\par -Oncotype DX score was reviewed today at length with the patient and her daughter on the left the score is low at 17 which equates to a 15% risk of distant recurrence in the next 10 years on the right the scores also low risk at 10 with a 3% risk of distant recurrence over the next 10 years.\par -Because both Oncotype's are low risk this would indicate that based on the molecular biology there would be no significant benefit to giving chemotherapy to reduce the risk of recurrence.  However in looking at the clinical risks of both cancers the left with the high Ki-67 and the 3+ lymph nodes clinically the patient is high risk.\par -Reviewed with patient that both MammaPrint sent on both the right and left cancers results also came back low risk a consistent with a lack of chemotherapy benefit.\par -Explained the difference between molecular phenotype low risk score and how these both can be incorporated.\par -Reviewed the final pathology from the bilateral mastectomy which shows residual cancer on the right and on the left there are no additional positive lymph nodes.  Therefore the final staging as was determined by the lumpectomy specimens is unchanged.\par -Again reviewed that in order for the molecular studies to be valid especially the Oncotype have to consider the Rxsponder trial and the enrollment criteria which included 3 or less positive lymph nodes.\par -Because the additional lymph node dissection did not show any additional positive lymph nodes the patient falls within this criteria.However I did explain that given the 2 primaries and especially given that the right side was over 5 cm she would considered a higher risk side clinically especially given that the left sided breast cancer was lymph node positive. Could consider giving chemotherapy in this situation however given that both Oncotype and MammaPrint would argue that there is no benefit to chemotherapy based on the molecular phenotype this must be taken into consideration.  Prior to molecular studies it would have been standard to give chemotherapy however now we know based on the molecular phenotype that chemotherapy may not be beneficial in this setting and that hormone therapy may be more beneficial.  In addition now we have the option of giving a CK4 6 inhibitor in the adjuvant setting for high risk patients that I believe this patient would fall into given the high Ki-67 and the large size and multiple positive lymph nodes.\par -we mutually decided to forgo chemotherapy and proceed with endocrine therapy \par -We discussed hormonal therapy at length including the mechanism efficacy and side effects.  We decided to go with letrozole, discussed the side effects of joint pains hot flashes and bone thinning.\par -In addition we discussed Verzenio, and the [employer] E data.  We discussed getting some efficacy and side effects including the risk of cytopenias, GI side effects, fibrosis.  \par - CT and Bone Scan negative for mets\par - She is s/p completion of radiation \par - Started Letrozole 5/2022- tolerated fairly well in the beginning. Now with persistent debilitating sxs, joint pain, weight gain, hot flashes. Recommended to hold for 1 week to see if symptoms resolve. \par -Verzenio started 7/2022. She is s/p ER visit for SOB, rash, congestion, and leg swelling. Records reviewed. Likely r/t to verzenio. 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 S/p 40mg prednisone with taper 08/26/22 and s/p completion of steroids on 9/30/22. Repeat CT chest 10/05/22 shows stable pneumonitis. Will consider lowering dose due to prior toxicity 100mg verzenio B.I.D. \par - Pt will start 100mg b.i.d verzenio on 11/5/22\par - Prior symptoms improved 1 week off of Letrozole. Will switch to Exemestane, started 11/2/22. \par - s/p L breast US to r/o seroma, revealing no evidence of malignancy, no fluid collection. pt continues to use \par - left axilla cording- improvement with PT at [place]. \par - abdominal distention - CT abd/pelvis. approved. pt scheduled 01/30/23. \par - LLE - edema, Oct 2022 doppler negative for DVT. recommend cont compression sleeve and PT \par -had follow up with ( Dr. Patricio) on Jan 9 , 2023- plan for breast revision end of March 2023. \par - remain on exemestane and verzenio ( 100mg b.i.d). tolerating well. no new complains today. \par - ca 15-3 elevated, but stable. \par \par #LUE lymphedema \par continue compression sleeve \par PT at [place] \par \par # vit d \par cont vit d \par \par # osteopenia \par up to date aug 2022\par \par #anxiety \par improving \par xanax as needed \par emotional support given \par \par #splenic lesion\par Incidental finding CT c/a/p - few lesions largest measuring 1.5cm.\par  repeat CTA 08/2022 show stable in appearance- 1.2cm\par has been stable since 2020 . repeat CT abd/pelvis scheduled 01/30/23. \par \par #insomnia\par slowly improving \par \par #LLE swelling\par 11/1/22- LLE duplex- no evidence of DVT \par lymphedema. \par CT abd/pelvis scheduled 01/30/23. \par consider PT \par \par #hand foot syndrome \par likely r/t to verzenio \par recommend bag balm. \par \par follow up in 1 month \par

## 2023-01-25 NOTE — REVIEW OF SYSTEMS
[Recent Change In Weight] : ~T recent weight change [Depression] : depression [Hot Flashes] : hot flashes [Negative] : Allergic/Immunologic [Fever] : no fever [Chills] : no chills [Night Sweats] : no night sweats [Fatigue] : no fatigue [Shortness Of Breath] : no shortness of breath [Wheezing] : no wheezing [Cough] : no cough [SOB on Exertion] : no shortness of breath during exertion [Abdominal Pain] : no abdominal pain [Vomiting] : no vomiting [Constipation] : no constipation [Diarrhea] : no diarrhea [Joint Pain] : no joint pain [Joint Stiffness] : joint stiffness [Muscle Pain] : no muscle pain [Muscle Weakness] : no muscle weakness [Difficulty Walking] : no difficulty walking [Insomnia] : no insomnia [Anxiety] : no anxiety [FreeTextEntry2] : prior symptoms have significantly gotten better. feels better today. sleeping better.  [FreeTextEntry3] : wears glasses [FreeTextEntry6] : sinus issues  [FreeTextEntry7] : see interval hx. pt still c/o of bloating- not worse.  [FreeTextEntry9] : see interval hx

## 2023-01-29 ENCOUNTER — RESULT REVIEW (OUTPATIENT)
Age: 68
End: 2023-01-29

## 2023-02-14 NOTE — DISEASE MANAGEMENT
[Pathological] : TNM Stage: p [IB] : IB [FreeTextEntry4] : Staging of the left breast cancer; the patient has bilateral breast cancer; right side is [id number] [TTNM] : 2 [NTNM] : 1a [MTNM] : 0 [de-identified] : Patient competed fractions 20/25, total dose of 4000 cGy delivered to her Left chest wall. left inguinal hernia repair,open

## 2023-02-16 ENCOUNTER — APPOINTMENT (OUTPATIENT)
Dept: BREAST CENTER | Facility: CLINIC | Age: 68
End: 2023-02-16
Payer: MEDICARE

## 2023-02-16 ENCOUNTER — NON-APPOINTMENT (OUTPATIENT)
Age: 68
End: 2023-02-16

## 2023-02-16 VITALS
BODY MASS INDEX: 30.27 KG/M2 | SYSTOLIC BLOOD PRESSURE: 162 MMHG | HEART RATE: 74 BPM | DIASTOLIC BLOOD PRESSURE: 96 MMHG | WEIGHT: 205 LBS | OXYGEN SATURATION: 99 %

## 2023-02-16 DIAGNOSIS — Z80.3 FAMILY HISTORY OF MALIGNANT NEOPLASM OF BREAST: ICD-10-CM

## 2023-02-16 DIAGNOSIS — Z90.13 ACQUIRED ABSENCE OF BILATERAL BREASTS AND NIPPLES: ICD-10-CM

## 2023-02-16 PROCEDURE — 99213 OFFICE O/P EST LOW 20 MIN: CPT

## 2023-02-16 NOTE — PHYSICAL EXAM
[Normocephalic] : normocephalic [Atraumatic] : atraumatic [EOMI] : extra ocular movement intact [Supple] : supple [No Supraclavicular Adenopathy] : no supraclavicular adenopathy [No Cervical Adenopathy] : no cervical adenopathy [Examined in the supine and seated position] : examined in the supine and seated position [Breast Mass Right Breast ___cm] : no masses [Breast Mass Left Breast ___cm] : no masses [No Axillary Lymphadenopathy] : no left axillary lymphadenopathy [No Edema] : no edema [No Rashes] : no rashes [No Ulceration] : no ulceration [de-identified] : On exam, the patient has well after her bilateral total mastectomies with left axillary lymph node dissection and bilateral prepectoral expander reconstructions.  She did undergo bilateral chest wall radiation and has radiation changes bilaterally.  On palpation, I cannot feel any suspicious densities over either expander. [de-identified] : Status post lower inner quadrant partial mastectomy with tissue rearrangement oncoplastic reduction mastopexy and sentinel lymph node biopsy followed by later completion mastectomy for positive margins and prepectoral expander reconstruction and then postmastectomy chest wall radiation with no suspicious findings. [de-identified] : Status post lower inner quadrant partial mastectomy with tissue rearrangement oncoplastic reduction mastopexy and sentinel lymph node biopsy followed by completion total mastectomy with left axillary lymph node dissection and prepectoral expander reconstruction due to positive margins and positive sentinel lymph nodes.  She then had chest wall radiation and has radiation changes but no suspicious findings.

## 2023-02-16 NOTE — ASSESSMENT
[FreeTextEntry1] : The patient is a 67-year-old G3, P3 postmenopausal white female.  She underwent menarche at age 11 and had her first child at age 24.  She underwent menopause at age 54 and never took any hormone replacement therapy.  She has a family history with her mother who had colon cancer and passed away at age 72.  Her paternal grandmother passed away from breast cancer in her 60s.  A maternal cousin had ovarian cancer at age 55 and then breast cancer at age 63 and tested BRCA negative.  2 paternal cousins had breast cancer at ages 45 and age 50.  Both tested BRCA negative.  Her sister had a phyllodes tumor excised.  I take care of both her sisters, Racheal Stevenson and Mrs. Dunn.  She was found to have some architectural distortion in the 12:00 region of the right breast on mammography and ultrasound showed a 5 mm corresponding density.  Ultrasound-guided right breast 12:00 core biopsy at [facility] showed some atypical duct hyperplasia and she underwent a wide excision on March 27, 2019 showing a radial sclerosing lesion but no further atypia.  She has a Caren model lifetime risk of 15.7% and 5-year risk of 4.1%.  She underwent routine mammography and ultrasound at [facility] on October 26, 2021 showing postsurgical changes in the right breast  but there was a new ill-defined spiculated mass in the left breast lower inner 7:00 region measuring about 2 cm on mammography and 1.4 x 1.7 x 1.2 cm on ultrasound in the 7:00 region 6 cm from the nipple.  There was also an enlarged left lower axillary lymph node measuring 4.4 x 1.3 cm.  She underwent a left breast ultrasound-guided core biopsy on November 5, 2021 with the placement of a "cylindrical" shaped clip and pathology showing an intermediate grade 1.2 cm invasive duct cancer which is ER/CO strongly positive at 95 to 100% and HER-2/merrill negative by IHC with a Ki-67 between 40 and 50%.  On exam, at that time, I could not feel any suspicious densities in either breast even though close attention to the left breast 7:00 region. I reviewed her outside ultrasound from [facility] as well as her pathology and this was an intermediate grade invasive duct cancer which appeared localized on ultrasound.  There was an enlarged lymph node underneath the left axilla but this really appeared benign and just enlarged.  MRI performed on November 29, 2021 showed another suspicious linear area of nonmasslike enhancement in the central left breast as well as an area of non-mass-like enhancement in the right breast lower inner and central region measuring about 4.7 cm.  She underwent bilateral MRI guided core biopsies performed on December 9, 2021 and the left central region showed a sclerosed papillary lesion.  The right breast lower inner quadrant showed an intermediate grade invasive lobular cancer which was ER positive at 80%, CO positive at 40%, HER-2/merrill negative by IHC with a Ki-67 of 25%.  She was seen by Dr. Sangeeta Jones.  [employer] genetic panel testing was sent and was negative.  She understood the equivalent survival rates with lumpectomy and radiation versus mastectomy and  I spoke to her about the benefits of endocrine therapy given her hormone positive status as well.  Risk/benefits of proposed surgeries were also explained to the patient.  She decided to go forward with bilateral partial mastectomies and was seen by Dr. Patricio to undergo bilateral mastopexies at the same sitting.   She underwent localizations preoperatively and then underwent bilateral partial mastectomies with bilateral mastopexies and tissue rearrangement by Dr. Patricio with bilateral sentinel lymph nodes on January 21, 2022.  The final pathology on the left showed an invasive duct carcinoma with 4 separate satellite lesions with the main tumor measuring 2.7 cm and some surrounding DCIS but she had close margins on initial excision especially on the superior aspect but had a positive inferior margin on the further margin excisions but had 3 sentinel lymph nodes 2 with macrometastasis with some extranodal extension and the third sentinel node with a micrometastasis measuring 0.3 mm and a fourth nonsentinel lymph node which was negative making this a pathologic anatomic stage IIA but pathologic prognostic stage IA breast cancer (ER/CO positive greater than 95% and HER-2/merrill negative with a Ki-67 of 25%).  The separate left breast excision of the 12:00 sclerosing papilloma showed no residual lesion and was benign showing biopsy site changes.  The right breast partial mastectomy showed a classical invasive lobular cancer measuring 5 cm and she had 2 sentinel lymph nodes 1 with some isolated tumor cells in the initial wide excision showed a close medial margin but formal margins showed a positive inferior margin with a classical invasive lobular cancer making this a pathologic anatomic stage IIA but pathologic prognostic stage IA breast cancer (ER/CO positive at 99% and HER-2/merrill negative with a Ki-67 of 8%).  The patient has was seen Dr. Mccullough from radiation oncology and Dr. Jones.  She underwent Oncotype recurrence scores by Dr. Jones and the right lobular cancer had a recurrence score of 10 and the left node positive ductal cancer had a recurrence score of 17.  She was entered into the FLEX trial and MammaPrint as part of the FLEX trial showed a low risk luminal A subtype on both bilateral cancers.  I had a discussion with Dr. Jones and we have decided to move forward with surgery given the improved prognostic genomic profiles.  Due to the difficulty in determining the inferior margin as well as the multifocal nature is of her cancers, I recommended bilateral mastectomies and advised removal of both nipple areolar complexes and using the reduction mastopexy incisions.  She was seen Dr. Patricio in regards to reconstruction options.  She did have a second opinion down at [facility] who agreed with the axillary lymph node dissection but they felt that she could possibly just undergo radiation at this point but given the multifocal nature of this bilateral breast cancer I was uncomfortable with just radiation given the margin status.  She underwent the surgery with bilateral total mastectomies and left axillary lymph node dissection and she had bilateral prepectoral expander reconstructions with AlloDerm by Dr. Patricio performed on April 11, 2022.  The final pathology showed no further cancer in either mastectomy specimen and she had 9 negative left axillary lymph nodes however a separate lower inner quadrant anterior margin on the right side showed a 5 mm focus of invasive lobular cancer which focally extended to the anterior margin.  The patient followed up with Dr. Sangeeta Jones and MammaPrints were sent on the bilateral tumors which turned out to be low risk luminal A types.  She was recommended to start letrozole and Verzenio after radiation.  She did undergo bilateral external beam chest wall radiation with Dr. Mccullough which finished in July 2022.  On exam today, she has healed well and no evidence of any recurrence over either prepectoral expander and has tolerated chest wall radiation well.  She continues to follow up with Dr. Patricio from a cosmetic standpoint and is planning exchange of the expanders likely in April 2023.  I would like to see her again in another 6 months for routine follow-up.  She did receive lymphedema therapy due to the radiation and axillary lymph node dissection on the left side and is wearing a lymphedema sleeve on the left.  She should continue to follow-up with Dr. Bryant and remain on Femara and Verzenio.  She was having a fair amount of diarrhea on the Verzenio but this is now better controlled.

## 2023-02-16 NOTE — HISTORY OF PRESENT ILLNESS
[FreeTextEntry1] : The patient is a 67-year-old G3, P3 postmenopausal white female.  She underwent menarche at age 11 and had her first child at age 24.  She underwent menopause at age 54 and never took any hormone replacement therapy.  She has a family history with her mother who had colon cancer and passed away at age 72.  Her paternal grandmother passed away from breast cancer in her 60s.  A maternal cousin had ovarian cancer at age 55 and then breast cancer at age 63 and tested BRCA negative.  2 paternal cousins had breast cancer at ages 45 and age 50.  Both tested BRCA negative.  Her sister had a phyllodes tumor excised.  I take care of both her sisters, Racheal Stevenson and Mrs. Dunn.  She comes in now and was found to have some architectural distortion in the 12:00 region of the right breast on mammography and ultrasound showed a 5 mm corresponding density.  Ultrasound-guided right breast 12:00 core biopsy at [facility] showed some atypical duct hyperplasia and she underwent a wide excision on March 27, 2019 showing a radial sclerosing lesion but no further atypia.  She has a Caren model lifetime risk of 15.7% and 5-year risk of 4.1%. She underwent routine mammography and ultrasound at [facility] on October 26, 2021 showing postsurgical changes in the right breast  but there was a new ill-defined spiculated mass in the left breast lower inner 7:00 region measuring about 2 cm on mammography and 1.4 x 1.7 x 1.2 cm on ultrasound in the 7:00 region 6 cm from the nipple.  There was also an enlarged left lower axillary lymph node measuring 4.4 x 1.3 cm.  She underwent a left breast ultrasound-guided core biopsy on November 5, 2021 with the placement of a "cylindrical" shaped clip and pathology showing an intermediate grade 1.2 cm invasive duct cancer which was ER/NM strongly positive at 95 to 100% and HER-2/merrill negative by IHC with a Ki-67 between 40 and 50%.  MRI performed on November 29, 2021 showed another suspicious linear area of nonmasslike enhancement in the central left breast as well as an area of non-mass-like enhancement in the right breast lower inner and central region measuring about 4.7 cm.  She underwent bilateral MRI guided core biopsies performed on December 9, 2021 and the left central region showed a sclerosed papillary lesion.  The right breast lower inner quadrant showed an intermediate grade invasive lobular cancer which was ER positive at 80%, NM positive at 40%, HER-2/merrill negative by IHC with a Ki-67 of 25%.  Inv[employer]ae genetic panel testing was performed preoperatively and was negative.  She was seen by Dr. Sangeeta Jones preoperatively.  She underwent localizations preoperatively and then underwent bilateral partial mastectomies with bilateral mastopexies and tissue rearrangement by Dr. Patricio with bilateral sentinel lymph nodes on January 21, 2022.  The final pathology on the left showed an invasive duct carcinoma with 4 separate satellite lesions with the main tumor measuring 2.7 cm and some surrounding DCIS but she had close margins on initial excision especially on the superior aspect but had a positive inferior margin on the further margin excisions but had 3 sentinel lymph nodes 2 with macrometastasis with some extranodal extension and the third sentinel node with a micrometastasis measuring 0.3 mm and a fourth nonsentinel lymph node which was negative making this a pathologic anatomic stage IIA but pathologic prognostic stage IA breast cancer (ER/NM positive greater than 95% and HER-2/merrill negative with a Ki-67 of 25%).  The separate left breast excision of the 12:00 sclerosing papilloma showed no residual lesion and was benign showing biopsy site changes.  The right breast partial mastectomy showed a classical invasive lobular cancer measuring 5 cm and she had 2 sentinel lymph nodes 1 with some isolated tumor cells in the initial wide excision showed a close medial margin but formal margins showed a positive inferior margin with a classical invasive lobular cancer making this a pathologic anatomic stage IIA but pathologic prognostic stage IA breast cancer (ER/NM positive at 99% and HER-2/merrill negative with a Ki-67 of 8%).  She underwent Oncotype recurrence scores by Dr. Jones and the right lobular cancer had a recurrence score of 10 and the left node positive ductal cancer had a recurrence score of 17.  She was entered into the FLEX trial and MammaPrint showed a low risk luminal A subtype on each bilateral cancer.  After discussion with the medical oncologist, it was decided to go forward with bilateral mastectomies with left axillary lymph node dissection and she had bilateral prepectoral expander reconstructions with AlloDerm performed on April 11, 2022. The final pathology showed no further cancer in either mastectomy specimen and she had 9 negative left axillary lymph nodes however a separate lower inner quadrant anterior margin on the right side showed a 5 mm focus of invasive lobular cancer which focally extended to the anterior margin.  She underwent bilateral chest wall radiation through Dr. Mccullough which finished in July 2022.  She is currently on letrozole and Verzenio.  She comes in for routine follow-up.

## 2023-02-16 NOTE — REASON FOR VISIT
[Follow-Up: _____] : a [unfilled] follow-up visit [FreeTextEntry1] : The patient comes in with a strong family history of breast cancer and ovarian cancer and a personal history of undergoing a right breast 12:00 ultrasound-guided core biopsy showing atypical duct hyperplasia and then a wide excision in March 2019 just showing a radial sclerosing lesion with no further atypia.  She has a Caren model risk of 15.7%.  She was then found to have an abnormal mammography and ultrasound  in October 2021 with a suspicious approximately 2 cm left breast lower inner quadrant density which was biopsied on November 5, 2021 showing intermediate grade invasive duct cancer which was ER/MT strongly positive and HER-2/merrill negative with a Ki-67 between 40 and 50%.  She underwent an MRI November 2021 and was found to have a suspicious area in the lower inner aspect of the right breast as well as another area of enhancement in the central left breast and MRI core biopsy showed a right breast lower inner quadrant density to be an intermediate grade invasive lobular cancer which was ER/MT positive HER-2/merrill negative with a Ki-67 of 25%.  The central left breast biopsy showed a sclerosed papillary lesion.  Genetic testing was negative.  She underwent bilateral partial mastectomies with bilateral mastopexies and tissue rearrangement by Dr. Patricio with bilateral sentinel lymph node biopsies in January 2022.  Final pathology showed 4 separate satellite lesions in the left breast which was a ductal cancer with the largest focus measuring 2.7 cm but she had a positive inferior margin and had 3 sentinel nodes 2 with macrometastasis with extranodal extension and the third sentinel node with a micrometastasis and 1 negative nonsentinel lymph node making this a pathologic prognostic stage IB breast cancer.  The right breast partial mastectomy showed a 5 cm classical invasive lobular cancer with 2 sentinel nodes 1 with some isolated tumor cells and she also had a positive inferior margin with lobular cancer making this a pathologic prognostic stage IA breast cancer.  She had bilateral positive inferior margins and macrometastasis in the left axillary sentinel lymph nodes.  She underwent Oncotype recurrence scores by Dr. Jones and the right lobular cancer had a recurrence score of 10 and the left node positive ductal cancer had a recurrence score of 17.  She was entered into the FLEX trial and MammaPrint showed a low risk luminal A subtype.  It was decided that she undergo bilateral mastectomies for the positive margins and a left axillary lymph node dissection which was performed on April 11, 2022 prior to any chemotherapy decision.  The final pathology showed no further cancer in either mastectomy specimen and she had 9 negative left axillary lymph nodes however a separate lower inner quadrant anterior margin on the right side showed a 5 mm focus of invasive lobular cancer which focally extended to the anterior margin.  She was seen by radiation and medical oncology and received bilateral chest wall radiation and was then placed on Femara and Verzenio.  She comes in now for routine follow-up.

## 2023-02-22 ENCOUNTER — RESULT REVIEW (OUTPATIENT)
Age: 68
End: 2023-02-22

## 2023-02-22 ENCOUNTER — APPOINTMENT (OUTPATIENT)
Dept: HEMATOLOGY ONCOLOGY | Facility: CLINIC | Age: 68
End: 2023-02-22
Payer: MEDICARE

## 2023-02-22 VITALS
DIASTOLIC BLOOD PRESSURE: 89 MMHG | TEMPERATURE: 97.4 F | BODY MASS INDEX: 34.53 KG/M2 | HEART RATE: 74 BPM | HEIGHT: 67 IN | RESPIRATION RATE: 18 BRPM | SYSTOLIC BLOOD PRESSURE: 145 MMHG | OXYGEN SATURATION: 99 % | WEIGHT: 220 LBS

## 2023-02-22 PROCEDURE — 99214 OFFICE O/P EST MOD 30 MIN: CPT | Mod: 25

## 2023-02-22 PROCEDURE — 36415 COLL VENOUS BLD VENIPUNCTURE: CPT

## 2023-02-22 RX ORDER — FLUTICASONE FUROATE AND VILANTEROL TRIFENATATE 100; 25 UG/1; UG/1
100-25 POWDER RESPIRATORY (INHALATION)
Qty: 60 | Refills: 0 | Status: DISCONTINUED | COMMUNITY
Start: 2022-10-13 | End: 2023-02-22

## 2023-02-22 NOTE — HISTORY OF PRESENT ILLNESS
[de-identified] : This is a 67-year-old postmenopausal woman presenting with a new diagnosis of breast cancer\par Patient goes for regular routine screening mammograms and was initally  found to have architectural distortion at the right breast 12 o'clock position, ultrasound revealing for a 5 mm density she underwent a right breast  biopsy at [facility]  on 3/2019.  which showed atypical ductal hyperplasia and then underwent wide excision in March 2019 showing a radial sclerosing lesion but no further atypia or malignancy. Dr juarez. \par ? chemo prevention ? \par  Patient follows with Dr. Jessica Hi due to the high risk status and goes for regular mammo and MRI \par \par Routine screening mammogram on October 26, 2021 showed postsurgical changes right breast but there was a new ill-defined spiculated mass left breast 7 o'clock position measuring 2 cm there is also an enlarged left axillary lymph node measuring 4.4 x 1.3 cm ( this appeared benign  was not seen on  MRI) \par \par patient underwent a left breast ultrasound-guided core biopsy on November 5, 2021  which revealed an intermediate grade invasive  ductal carcinoma ER/NH strongly +95 to 100% HER-2/merrill negative Ki-67 between 40 and 50%.\par \par MRI performed on November 29, 2021 revealing for a biopsy-proven malignancy in her left breast up to 2.1 cm with suspicious linear nonmasslike enhancement extending to the anterior inferior index mass total suspicious area of enhancement is 3.3 cm also linear non-mass enhancement left breast at the central portion of the breast MRI guided biopsy was recommended as well suspicious nonletter masslike enhancement in the inner right breast measuring 4.7 cm MRI guided biopsy was recommended.\par \par MRI guided biopsy performed on December 9, 2021 revealing for the following left breast non-mass-like enhancement showed usual ductal hyperplasia\par \par Right breast non-mass-like enhancement lower inner quadrant revealed grade 2 invasive lobular carcinoma grade 2 out of 3 invasive carcinoma was present in multiple cores up to 12 mm LCIS was also present and focal atypical ductal hyperplasia\par Tumor was ER +80% NH +40% HER-2/merrill negative Ki-67 25%.\par \par Await genetic testing , monday.  9 genes\par Daughter is negative for BRCA \par \par Patient has extensive family history of cancer including a mother who had colon cancer a paternal grandmother who had breast cancer maternal cousin who had ovarian cancer and then breast cancer.  2 paternal cousins who had breast cancer her sister had a phyllodes \par \par Status post bilateral partial mastectomies with sentinel lymph node biopsy on January 21, 2022 with Dr. Jessica Hi.\par \par Right side-final pathological staging PT2N0(i) \par -Largest tumor 5 cm classic invasive lobular smaller tumor measuring 4 mm\par -Grade 2, no lymphovascular invasion\par -Margin less than 1 mm inferior\par -1 out of 2 lymph nodes revealing for isolated tumor cells\par -ER NH positive both at 99% HER-2 negative Ki-67 8%\par \par Left side - final path T2N1a\par Largest tumor 2.7 cm with satellite lesions measuring 1 mm and 2 mm invasive ductal carcinoma\par -Grade 2 no lymphovascular invasion\par 2 lymph nodes positive for macro metastases measuring 2.4 cm with extranodal extension\par 1 lymph node positive for micro met for a total of 3 out of 4 lymph nodes positive\par ER/NH +95% and 99%, HER-2 negative Ki-67 25%\par Margins were also close as well.\par \par Oncotype DX score on the left was low risk at 17 equating to a 15% risk of distant recurrence.\par Oncotype DX score on the right was low risk at 10 with a 3% risk of distant recurrence over the next 10 years.\par \par CT of the chest abdomen pelvis show hypoattenuation lesion in the spleen measuring 1.5 cm but no evidence of metastatic disease\par Bone scan negative for any evidence of metastatic disease.\par \par \par \par \par \par \par \par  [de-identified] : Patient seen and examined today for follow up. \par She remains on Verzenio/Examestane. Tolerating well with lower dose of Verzenio. Diarrhea improved. She has frequent BM but normal consistency. \par She continues to follow with Dr. Teixeira. Last seen 2/16/23. \par Patient also has hx of LUE lymphedema, wears compression sleeve. No longer seeing PT. Arm is improved but still with swelling to the L hand. Wearing a glove. \par saw Dr. Patricio-plan for breast revision end of 3/2023. \par Notes leg swelling has improved. \par She is s/p CT CAP Cause of the patient's symptoms not definitely identified. Right gonadal/adnexa vein thrombosis. She was started on Eliquis. Now taking 5mg BID. Easy bruising noted. \par She has not followed with GYN but has an apt with Dr. Ly 4/2023. \par She follows with Dr. Smith at [facility]. Going for Stress test 3/9/23. \par Of note she will be travelling to [place] to helpher daughter with new baby. \par She notes feeling overall well.

## 2023-02-22 NOTE — REVIEW OF SYSTEMS
[Easy Bruising] : a tendency for easy bruising [Negative] : Endocrine [Fever] : no fever [Chills] : no chills [Night Sweats] : no night sweats [Fatigue] : no fatigue [Shortness Of Breath] : no shortness of breath [Wheezing] : no wheezing [Cough] : no cough [SOB on Exertion] : no shortness of breath during exertion [Abdominal Pain] : no abdominal pain [Vomiting] : no vomiting [Constipation] : no constipation [Diarrhea] : no diarrhea [Joint Pain] : no joint pain [Muscle Pain] : no muscle pain [Muscle Weakness] : no muscle weakness [Difficulty Walking] : no difficulty walking [Insomnia] : no insomnia [Anxiety] : no anxiety [FreeTextEntry2] : sleep is better [FreeTextEntry3] : wears glasses [FreeTextEntry6] : sinus issues  [FreeTextEntry7] : frequency of BM not diarrhea or constipation. RLQ pain resolved  [FreeTextEntry9] : lower extremity swelling better [de-identified] : +L arm lymphedema improved with compression stocking but L hand swelling, wears glove  [de-identified] : started on Eliquis

## 2023-02-22 NOTE — ASSESSMENT
[FreeTextEntry1] : This is a 67-year-old postmenopausal woman presenting with bilateral breast cancer.\par Routine screening mammogram revealing for new abnormality on the left biopsy revealing for invasive ductal carcinoma ER/MO strongly positive HER-2/merrill negative.  Initially there was an enlarged lymph node but this was thought to be negative due to appearance and the lack of visualization on MRI.  Then on MRI in November 2021 patient was found to have a contralateral breast mass on the right measuring 4.7 cm biopsy was revealing for invasive lobular carcinoma grade 2 ER/MO positive HER-2/merrill negative.  She is now planned for bilateral lumpectomies.\par \par #bilateral breast cancer \par - the right is in T2N 0 (i) with a 5 cm invasive lobular carcinoma and 2 lymph nodes positive for isolated tumor cells.  ER/MO strongly positive at 99% respectively HER-2 negative Ki-67 8%, grade 2 , no LVI \par -The left side is positive T2N1a for a 2.7 cm invasive ductal carcinoma grade 2 no lymphovascular invasion  2 macrometastases to the lymph node and 1 micrometastases for a grand total of 3 out of 4 this tumor is also ER/MO positive strongly at 95 and 99% respectively but the Ki-67 is higher at 25%\par -Oncotype DX score was reviewed today at length with the patient and her daughter on the left the score is low at 17 which equates to a 15% risk of distant recurrence in the next 10 years on the right the scores also low risk at 10 with a 3% risk of distant recurrence over the next 10 years.\par -Because both Oncotype's are low risk this would indicate that based on the molecular biology there would be no significant benefit to giving chemotherapy to reduce the risk of recurrence.  However in looking at the clinical risks of both cancers the left with the high Ki-67 and the 3+ lymph nodes clinically the patient is high risk.\par -Reviewed with patient that both MammaPrint sent on both the right and left cancers results also came back low risk a consistent with a lack of chemotherapy benefit.\par -Explained the difference between molecular phenotype low risk score and how these both can be incorporated.\par -Reviewed the final pathology from the bilateral mastectomy which shows residual cancer on the right and on the left there are no additional positive lymph nodes.  Therefore the final staging as was determined by the lumpectomy specimens is unchanged.\par -Again reviewed that in order for the molecular studies to be valid especially the Oncotype have to consider the Rxsponder trial and the enrollment criteria which included 3 or less positive lymph nodes.\par -Because the additional lymph node dissection did not show any additional positive lymph nodes the patient falls within this criteria.However I did explain that given the 2 primaries and especially given that the right side was over 5 cm she would considered a higher risk side clinically especially given that the left sided breast cancer was lymph node positive. Could consider giving chemotherapy in this situation however given that both Oncotype and MammaPrint would argue that there is no benefit to chemotherapy based on the molecular phenotype this must be taken into consideration.  Prior to molecular studies it would have been standard to give chemotherapy however now we know based on the molecular phenotype that chemotherapy may not be beneficial in this setting and that hormone therapy may be more beneficial.  In addition now we have the option of giving a CK4 6 inhibitor in the adjuvant setting for high risk patients that I believe this patient would fall into given the high Ki-67 and the large size and multiple positive lymph nodes.\par -we mutually decided to forgo chemotherapy and proceed with endocrine therapy \par -We discussed hormonal therapy at length including the mechanism efficacy and side effects.  We decided to go with letrozole, discussed the side effects of joint pains hot flashes and bone thinning.\par -In addition we discussed Verzenio, and the [employer] E data.  We discussed getting some efficacy and side effects including the risk of cytopenias, GI side effects, fibrosis.  \par - CT and Bone Scan negative for mets\par - She is s/p completion of radiation \par - Started Letrozole 5/2022- tolerated fairly well in the beginning. Now with persistent debilitating sxs, joint pain, weight gain, hot flashes. Recommended to hold for 1 week to see if symptoms resolve. \par -Verzenio started 7/2022. She is s/p ER visit for SOB, rash, congestion, and leg swelling. Records reviewed. Likely r/t to verzenio. 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 S/p 40mg prednisone with taper 08/26/22 and s/p completion of steroids on 9/30/22. Repeat CT chest 10/05/22 shows stable pneumonitis. Will consider lowering dose due to prior toxicity 100mg verzenio B.I.D. \par - Pt will start 100mg b.i.d verzenio on 11/5/22\par - Prior symptoms improved 1 week off of Letrozole. Will switch to Exemestane, started 11/2/22. \par - s/p L breast US to r/o seroma, revealing no evidence of malignancy, no fluid collection. pt continues to use \par - left axilla cording- improvement with PT at [place]. \par - abdominal distention - CT abd/pelvis. approved. pt scheduled 01/30/23. \par - LLE - edema, Oct 2022 doppler negative for DVT. recommend cont compression sleeve and PT \par - had follow up with ( Dr. Patricio) on Jan 9 , 2023- plan for breast revision end of March 2023. \par - Remain on exemestane and verzenio ( 100mg b.i.d). tolerating well. - continue\par - Ca 15-3 elevated, but stable. \par \par # #R gonadal/adnexa vein thrombosis \par - s/p CT CAP \par - started on Eliquis, now 5mg BID \par - Has follow up with Dr. Ly 4/2023 but patient notes she may need to reschedule as she will be going to [place] \par - reviewed with patient - side effect of Verzenio.\par - patient to stay on AC while on Verzenio \par \par #LUE lymphedema -Fall 2022, s/p RT \par - Continue compression sleeve \par - PT at [facility] now completed \par \par # osteopenia \par - DEXA 8/2022 \par \par #anxiety \par - Improving \par - Xanax as needed \par - Emotional support given \par \par #splenic lesion\par - Incidental finding CT c/a/p - few lesions largest measuring 1.5cm.\par - Repeat CTA 08/2022 show stable in appearance- 1.2cm\par  - Stable since 2020 s/p repeat CT CAP 1/30/23 \par \par #insomnia\par - Improved \par \par #LLE swelling\par - 11/1/22- LLE duplex- no evidence of DVT \par - Lymphedema. \par - CT abd/pelvis scheduled 01/30/23 R gonadal/adnexa v thrombosis \par - Consider PT\par - Improved \par \par #hand foot syndrome \par - Likely r/t to Verzenio \par - Recommend bag balm\par - stable \par \par RTC in  6 weeks\par \par

## 2023-02-22 NOTE — PHYSICAL EXAM
[de-identified] : b/l breast s/p partial mastectomy and expanders in place. no palpable masses nor lesions. left axilla with cording.   [de-identified] : moderate distention . normoactive bowel sounds. no palpable tenderness . no guarding upon percussion [de-identified] : LUE and LLE lymphedema. wears compression sleeve to LUE  [de-identified] : dry skin. cracked in fingertips . no open wounds.

## 2023-03-15 ENCOUNTER — NON-APPOINTMENT (OUTPATIENT)
Age: 68
End: 2023-03-15

## 2023-04-19 ENCOUNTER — RESULT REVIEW (OUTPATIENT)
Age: 68
End: 2023-04-19

## 2023-04-19 ENCOUNTER — APPOINTMENT (OUTPATIENT)
Dept: HEMATOLOGY ONCOLOGY | Facility: CLINIC | Age: 68
End: 2023-04-19
Payer: MEDICARE

## 2023-04-19 VITALS
BODY MASS INDEX: 34.84 KG/M2 | SYSTOLIC BLOOD PRESSURE: 170 MMHG | HEART RATE: 71 BPM | HEIGHT: 67 IN | TEMPERATURE: 97.6 F | RESPIRATION RATE: 18 BRPM | DIASTOLIC BLOOD PRESSURE: 94 MMHG | WEIGHT: 222 LBS | OXYGEN SATURATION: 97 %

## 2023-04-19 PROCEDURE — 99213 OFFICE O/P EST LOW 20 MIN: CPT

## 2023-04-19 NOTE — ASSESSMENT
[FreeTextEntry1] : This is a 67-year-old postmenopausal woman presenting with bilateral breast cancer.\par Routine screening mammogram revealing for new abnormality on the left biopsy revealing for invasive ductal carcinoma ER/OH strongly positive HER-2/merrill negative.  Initially there was an enlarged lymph node but this was thought to be negative due to appearance and the lack of visualization on MRI.  Then on MRI in November 2021 patient was found to have a contralateral breast mass on the right measuring 4.7 cm biopsy was revealing for invasive lobular carcinoma grade 2 ER/OH positive HER-2/merrill negative.  She is now planned for bilateral lumpectomies.\par \par #bilateral breast cancer \par - the right is in T2N 0 (i) with a 5 cm invasive lobular carcinoma and 2 lymph nodes positive for isolated tumor cells.  ER/OH strongly positive at 99% respectively HER-2 negative Ki-67 8%, grade 2 , no LVI \par -The left side is positive T2N1a for a 2.7 cm invasive ductal carcinoma grade 2 no lymphovascular invasion  2 macrometastases to the lymph node and 1 micrometastases for a grand total of 3 out of 4 this tumor is also ER/OH positive strongly at 95 and 99% respectively but the Ki-67 is higher at 25%\par -Oncotype DX score was reviewed today at length with the patient and her daughter on the left the score is low at 17 which equates to a 15% risk of distant recurrence in the next 10 years on the right the scores also low risk at 10 with a 3% risk of distant recurrence over the next 10 years.\par -Because both Oncotype's are low risk this would indicate that based on the molecular biology there would be no significant benefit to giving chemotherapy to reduce the risk of recurrence.  However in looking at the clinical risks of both cancers the left with the high Ki-67 and the 3+ lymph nodes clinically the patient is high risk.\par -Reviewed with patient that both MammaPrint sent on both the right and left cancers results also came back low risk a consistent with a lack of chemotherapy benefit.\par -Explained the difference between molecular phenotype low risk score and how these both can be incorporated.\par -Reviewed the final pathology from the bilateral mastectomy which shows residual cancer on the right and on the left there are no additional positive lymph nodes.  Therefore the final staging as was determined by the lumpectomy specimens is unchanged.\par -Again reviewed that in order for the molecular studies to be valid especially the Oncotype have to consider the Rxsponder trial and the enrollment criteria which included 3 or less positive lymph nodes.\par -Because the additional lymph node dissection did not show any additional positive lymph nodes the patient falls within this criteria.However I did explain that given the 2 primaries and especially given that the right side was over 5 cm she would considered a higher risk side clinically especially given that the left sided breast cancer was lymph node positive. Could consider giving chemotherapy in this situation however given that both Oncotype and MammaPrint would argue that there is no benefit to chemotherapy based on the molecular phenotype this must be taken into consideration.  Prior to molecular studies it would have been standard to give chemotherapy however now we know based on the molecular phenotype that chemotherapy may not be beneficial in this setting and that hormone therapy may be more beneficial.  In addition now we have the option of giving a CK4 6 inhibitor in the adjuvant setting for high risk patients that I believe this patient would fall into given the high Ki-67 and the large size and multiple positive lymph nodes.\par -we mutually decided to forgo chemotherapy and proceed with endocrine therapy \par -We discussed hormonal therapy at length including the mechanism efficacy and side effects.  We decided to go with letrozole, discussed the side effects of joint pains hot flashes and bone thinning.\par -In addition we discussed Verzenio, and the [employer] E data.  We discussed getting some efficacy and side effects including the risk of cytopenias, GI side effects, fibrosis.  \par - CT and Bone Scan negative for mets\par - She is s/p completion of radiation \par - Started Letrozole 5/2022- tolerated fairly well in the beginning. Now with persistent debilitating sxs, joint pain, weight gain, hot flashes. Recommended to hold for 1 week to see if symptoms resolve. \par -Verzenio started 7/2022. She is s/p ER visit for SOB, rash, congestion, and leg swelling. Records reviewed. Likely r/t to verzenio. 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 S/p 40mg prednisone with taper 08/26/22 and s/p completion of steroids on 9/30/22. Repeat CT chest 10/05/22 shows stable pneumonitis. Will consider lowering dose due to prior toxicity 100mg verzenio B.I.D. \par - Pt will start 100mg b.i.d verzenio on 11/5/22\par - Prior symptoms improved 1 week off of Letrozole. Will switch to Exemestane, started 11/2/22. \par - s/p L breast US to r/o seroma, revealing no evidence of malignancy, no fluid collection. pt continues to use \par - left axilla cording- improvement with PT at [place]. \par - LLE - edema, Oct 2022 doppler negative for DVT. recommend cont compression sleeve and PT . pt also ordered lymphedema pump for LUE. \par - had follow up with ( Dr. Patricio) on Jan 9 , 2023- plan for possible breast revision April 2023- May 2023?\par - Remain on exemestane and verzenio ( 100mg b.i.d). tolerating well. - continue\par - Ca 15-3 elevated, but stable. continue to monitor \par \par # #R gonadal/adnexa vein thrombosis \par - s/p CT CAP 1/30/23 incidental finding \par - started on Eliquis, now 5mg BID \par - Had eval with Dr Ly APril 2023- TV ultrasound and pap smear completed. \par - reviewed with patient - side effect of Verzenio.\par - patient to stay on AC while on Verzenio \par -repeat CT pelvis May 2023. \par \par #LUE lymphedema -Fall 2022, s/p RT \par - Continue compression sleeve \par - PT at [facility] now completed \par - ordered compression pump \par \par # osteopenia \par - DEXA 8/2022 \par \par #anxiety \par - Improving \par - Xanax as needed \par - Emotional support given \par \par #splenic lesion\par - Incidental finding CT c/a/p - few lesions largest measuring 1.5cm.\par - Repeat CTA 08/2022 show stable in appearance- 1.2cm\par  - Stable since 2020 s/p repeat CT CAP 1/30/23 stable \par \par #insomnia\par - Improved \par \par #LLE swelling\par - 11/1/22- LLE duplex- no evidence of DVT \par - Lymphedema. \par - CT abd/pelvis scheduled 01/30/23 R gonadal/adnexa v thrombosis \par - Consider PT\par - not worse \par \par #hand foot syndrome \par - Likely r/t to Verzenio \par - Recommend bag balm\par - stable \par \par RTC in  6 weeks\par \par

## 2023-04-19 NOTE — HISTORY OF PRESENT ILLNESS
[de-identified] : This is a 67-year-old postmenopausal woman presenting with a new diagnosis of breast cancer\par Patient goes for regular routine screening mammograms and was initally  found to have architectural distortion at the right breast 12 o'clock position, ultrasound revealing for a 5 mm density she underwent a right breast  biopsy at [facility]  on 3/2019.  which showed atypical ductal hyperplasia and then underwent wide excision in March 2019 showing a radial sclerosing lesion but no further atypia or malignancy. Dr juarez. \par ? chemo prevention ? \par  Patient follows with Dr. Jessica Hi due to the high risk status and goes for regular mammo and MRI \par \par Routine screening mammogram on October 26, 2021 showed postsurgical changes right breast but there was a new ill-defined spiculated mass left breast 7 o'clock position measuring 2 cm there is also an enlarged left axillary lymph node measuring 4.4 x 1.3 cm ( this appeared benign  was not seen on  MRI) \par \par patient underwent a left breast ultrasound-guided core biopsy on November 5, 2021  which revealed an intermediate grade invasive  ductal carcinoma ER/NV strongly +95 to 100% HER-2/merrill negative Ki-67 between 40 and 50%.\par \par MRI performed on November 29, 2021 revealing for a biopsy-proven malignancy in her left breast up to 2.1 cm with suspicious linear nonmasslike enhancement extending to the anterior inferior index mass total suspicious area of enhancement is 3.3 cm also linear non-mass enhancement left breast at the central portion of the breast MRI guided biopsy was recommended as well suspicious nonletter masslike enhancement in the inner right breast measuring 4.7 cm MRI guided biopsy was recommended.\par \par MRI guided biopsy performed on December 9, 2021 revealing for the following left breast non-mass-like enhancement showed usual ductal hyperplasia\par \par Right breast non-mass-like enhancement lower inner quadrant revealed grade 2 invasive lobular carcinoma grade 2 out of 3 invasive carcinoma was present in multiple cores up to 12 mm LCIS was also present and focal atypical ductal hyperplasia\par Tumor was ER +80% NV +40% HER-2/merrill negative Ki-67 25%.\par \par Await genetic testing , monday.  9 genes\par Daughter is negative for BRCA \par \par Patient has extensive family history of cancer including a mother who had colon cancer a paternal grandmother who had breast cancer maternal cousin who had ovarian cancer and then breast cancer.  2 paternal cousins who had breast cancer her sister had a phyllodes \par \par Status post bilateral partial mastectomies with sentinel lymph node biopsy on January 21, 2022 with Dr. Jessica Hi.\par \par Right side-final pathological staging PT2N0(i) \par -Largest tumor 5 cm classic invasive lobular smaller tumor measuring 4 mm\par -Grade 2, no lymphovascular invasion\par -Margin less than 1 mm inferior\par -1 out of 2 lymph nodes revealing for isolated tumor cells\par -ER NV positive both at 99% HER-2 negative Ki-67 8%\par \par Left side - final path T2N1a\par Largest tumor 2.7 cm with satellite lesions measuring 1 mm and 2 mm invasive ductal carcinoma\par -Grade 2 no lymphovascular invasion\par 2 lymph nodes positive for macro metastases measuring 2.4 cm with extranodal extension\par 1 lymph node positive for micro met for a total of 3 out of 4 lymph nodes positive\par ER/NV +95% and 99%, HER-2 negative Ki-67 25%\par Margins were also close as well.\par \par Oncotype DX score on the left was low risk at 17 equating to a 15% risk of distant recurrence.\par Oncotype DX score on the right was low risk at 10 with a 3% risk of distant recurrence over the next 10 years.\par \par CT of the chest abdomen pelvis show hypoattenuation lesion in the spleen measuring 1.5 cm but no evidence of metastatic disease\par Bone scan negative for any evidence of metastatic disease.\par \par \par \par \par \par \par \par  [de-identified] : Patient seen and examined today for follow up. \par went to visit her new granddaughter in [place]. \par She remains on Verzenio/Examestane. Tolerating well with lower dose of Verzenio. Diarrhea improved. She has frequent BM but normal consistency. \par She continues to follow with Dr. Teixeira. Last seen 2/16/23 next appt 08/2023. . \par Patient also has hx of LUE lymphedema, wears compression sleeve. No longer seeing PT. Arm is improved but still with swelling to the L hand. Wearing a glove. \par saw Dr. Patricio-plan for breast revision , will see him soon. \par She is s/p CT CAP Cause of the patient's symptoms not definitely identified. Right gonadal/adnexa vein thrombosis. She was started on Eliquis. Now taking 5mg BID. Easy bruising noted. \par saw Dr. pulido- pap smear normal . had TV ultrasound negative \par getting lymphedema pump for LUE \par She follows with Dr. Smith at [facility]. stress test normal.

## 2023-04-19 NOTE — PHYSICAL EXAM
[Fully active, able to carry on all pre-disease performance without restriction] : Status 0 - Fully active, able to carry on all pre-disease performance without restriction [Normal] : normal appearance, no rash, nodules, vesicles, ulcers, erythema [de-identified] : b/l breast s/p partial mastectomy and expanders in place. no palpable masses nor lesions. left axilla with cording.   [de-identified] : moderate distention . normoactive bowel sounds. no palpable tenderness . no guarding upon percussion [de-identified] : LUE and LLE lymphedema. wears compression sleeve to LUE

## 2023-04-19 NOTE — REVIEW OF SYSTEMS
[Joint Stiffness] : joint stiffness [Depression] : depression [Easy Bruising] : a tendency for easy bruising [Fever] : no fever [Chills] : no chills [Night Sweats] : no night sweats [Fatigue] : no fatigue [Shortness Of Breath] : no shortness of breath [Wheezing] : no wheezing [Cough] : no cough [SOB on Exertion] : no shortness of breath during exertion [Abdominal Pain] : no abdominal pain [Vomiting] : no vomiting [Constipation] : no constipation [Diarrhea] : no diarrhea [Joint Pain] : no joint pain [Muscle Pain] : no muscle pain [Muscle Weakness] : no muscle weakness [Difficulty Walking] : no difficulty walking [Insomnia] : no insomnia [Anxiety] : no anxiety [Hot Flashes] : hot flashes [Negative] : Respiratory [FreeTextEntry3] : wears glasses [FreeTextEntry2] : sleep is better [FreeTextEntry7] : frequency of BM not diarrhea or constipation. RLQ pain resolved  [de-identified] : +L arm lymphedema improved with compression stocking but L hand swelling, wears glove  [de-identified] :  on Eliquis

## 2023-04-25 LAB
CANCER AG15-3 SERPL-ACNC: 42.2 U/ML
CEA SERPL-MCNC: 3.3 NG/ML

## 2023-05-17 ENCOUNTER — RESULT REVIEW (OUTPATIENT)
Age: 68
End: 2023-05-17

## 2023-05-24 ENCOUNTER — NON-APPOINTMENT (OUTPATIENT)
Age: 68
End: 2023-05-24

## 2023-05-31 ENCOUNTER — RESULT REVIEW (OUTPATIENT)
Age: 68
End: 2023-05-31

## 2023-05-31 ENCOUNTER — APPOINTMENT (OUTPATIENT)
Dept: HEMATOLOGY ONCOLOGY | Facility: CLINIC | Age: 68
End: 2023-05-31
Payer: MEDICARE

## 2023-05-31 VITALS
SYSTOLIC BLOOD PRESSURE: 163 MMHG | BODY MASS INDEX: 34.21 KG/M2 | OXYGEN SATURATION: 97 % | RESPIRATION RATE: 18 BRPM | WEIGHT: 218 LBS | HEIGHT: 67 IN | HEART RATE: 76 BPM | TEMPERATURE: 98.1 F | DIASTOLIC BLOOD PRESSURE: 90 MMHG

## 2023-05-31 PROCEDURE — 36415 COLL VENOUS BLD VENIPUNCTURE: CPT

## 2023-05-31 PROCEDURE — 99214 OFFICE O/P EST MOD 30 MIN: CPT | Mod: 25

## 2023-06-02 NOTE — REVIEW OF SYSTEMS
[Joint Stiffness] : joint stiffness [Depression] : depression [Hot Flashes] : hot flashes [Easy Bruising] : a tendency for easy bruising [Negative] : Allergic/Immunologic [Fever] : no fever [Chills] : no chills [Night Sweats] : no night sweats [Fatigue] : no fatigue [Shortness Of Breath] : no shortness of breath [Wheezing] : no wheezing [Cough] : no cough [SOB on Exertion] : no shortness of breath during exertion [Abdominal Pain] : no abdominal pain [Vomiting] : no vomiting [Constipation] : no constipation [Joint Pain] : no joint pain [Muscle Pain] : no muscle pain [Muscle Weakness] : no muscle weakness [Difficulty Walking] : no difficulty walking [Insomnia] : no insomnia [Anxiety] : no anxiety [FreeTextEntry2] : sleep is better [FreeTextEntry3] : wears glasses [FreeTextEntry7] : frequency of BM not diarrhea or constipation. RLQ pain resolved  [de-identified] : +L arm lymphedema improved with compression stocking but L hand swelling, wears glove  [de-identified] :  on Eliquis

## 2023-06-02 NOTE — ASSESSMENT
[FreeTextEntry1] : This is a 67-year-old postmenopausal woman presenting with bilateral breast cancer.\par Routine screening mammogram revealing for new abnormality on the left biopsy revealing for invasive ductal carcinoma ER/ID strongly positive HER-2/merrill negative.  Initially there was an enlarged lymph node but this was thought to be negative due to appearance and the lack of visualization on MRI.  Then on MRI in November 2021 patient was found to have a contralateral breast mass on the right measuring 4.7 cm biopsy was revealing for invasive lobular carcinoma grade 2 ER/ID positive HER-2/merrill negative.  She is now planned for bilateral lumpectomies.\par \par #bilateral breast cancer \par - the right is in T2N 0 (i) with a 5 cm invasive lobular carcinoma and 2 lymph nodes positive for isolated tumor cells.  ER/ID strongly positive at 99% respectively HER-2 negative Ki-67 8%, grade 2 , no LVI \par -The left side is positive T2N1a for a 2.7 cm invasive ductal carcinoma grade 2 no lymphovascular invasion  2 macrometastases to the lymph node and 1 micrometastases for a grand total of 3 out of 4 this tumor is also ER/ID positive strongly at 95 and 99% respectively but the Ki-67 is higher at 25%\par -Oncotype DX score was reviewed today at length with the patient and her daughter on the left the score is low at 17 which equates to a 15% risk of distant recurrence in the next 10 years on the right the scores also low risk at 10 with a 3% risk of distant recurrence over the next 10 years.\par -Because both Oncotype's are low risk this would indicate that based on the molecular biology there would be no significant benefit to giving chemotherapy to reduce the risk of recurrence.  However in looking at the clinical risks of both cancers the left with the high Ki-67 and the 3+ lymph nodes clinically the patient is high risk.\par -Reviewed with patient that both MammaPrint sent on both the right and left cancers results also came back low risk a consistent with a lack of chemotherapy benefit.\par -Explained the difference between molecular phenotype low risk score and how these both can be incorporated.\par -Reviewed the final pathology from the bilateral mastectomy which shows residual cancer on the right and on the left there are no additional positive lymph nodes.  Therefore the final staging as was determined by the lumpectomy specimens is unchanged.\par -Again reviewed that in order for the molecular studies to be valid especially the Oncotype have to consider the Rxsponder trial and the enrollment criteria which included 3 or less positive lymph nodes.\par -Because the additional lymph node dissection did not show any additional positive lymph nodes the patient falls within this criteria.However I did explain that given the 2 primaries and especially given that the right side was over 5 cm she would considered a higher risk side clinically especially given that the left sided breast cancer was lymph node positive. Could consider giving chemotherapy in this situation however given that both Oncotype and MammaPrint would argue that there is no benefit to chemotherapy based on the molecular phenotype this must be taken into consideration.  Prior to molecular studies it would have been standard to give chemotherapy however now we know based on the molecular phenotype that chemotherapy may not be beneficial in this setting and that hormone therapy may be more beneficial.  In addition now we have the option of giving a CK4 6 inhibitor in the adjuvant setting for high risk patients that I believe this patient would fall into given the high Ki-67 and the large size and multiple positive lymph nodes.\par -we mutually decided to forgo chemotherapy and proceed with endocrine therapy \par -We discussed hormonal therapy at length including the mechanism efficacy and side effects.  We decided to go with letrozole, discussed the side effects of joint pains hot flashes and bone thinning.\par -In addition we discussed Verzenio, and the FireScope E data.  We discussed getting some efficacy and side effects including the risk of cytopenias, GI side effects, fibrosis.  \par - CT and Bone Scan negative for mets\par - She is s/p completion of radiation \par - Started Letrozole 5/2022- tolerated fairly well in the beginning. Now with persistent debilitating sxs, joint pain, weight gain, hot flashes. Recommended to hold for 1 week to see if symptoms resolve. \par -Verzenio started 7/2022. She is s/p ER visit for SOB, rash, congestion, and leg swelling. Records reviewed. Likely r/t to verzenio. 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 S/p 40mg prednisone with taper 08/26/22 and s/p completion of steroids on 9/30/22. Repeat CT chest 10/05/22 shows stable pneumonitis. Will consider lowering dose due to prior toxicity 100mg verzenio B.I.D. \par - Pt will start 100mg b.i.d verzenio on 11/5/22\par - Prior symptoms improved 1 week off of Letrozole. Will switch to Exemestane, started 11/2/22. \par - s/p L breast US to r/o seroma, revealing no evidence of malignancy, no fluid collection. pt continues to use \par - left axilla cording- improvement with PT at [facility]. \par - LLE - edema, Oct 2022 doppler negative for DVT. recommend cont compression sleeve and PT . pt also ordered lymphedema pump for LUE. \par - had follow up with ( Dr. Patricio) on Jan 9 , 2023- plan for possible breast revision April 2023- May 2023?\par - Remain on exemestane and verzenio ( 100mg b.i.d). tolerating well. - continue\par - Ca 15-3 elevated, but stable. continue to monitor \par \par #R gonadal/adnexa vein thrombosis \par - s/p CT CAP 1/30/23 incidental finding \par - started on Eliquis, now 5mg BID \par - Had eval with Dr Ly April 2023- TV ultrasound and pap smear completed. \par - reviewed with patient - side effect of Verzenio.\par - patient to stay on AC while on Verzenio \par -repeat CT pelvis May 2023. \par \par # Arthritis\par - increase physical activity \par #LUE lymphedema -Fall 2022, s/p RT \par - Continue compression sleeve \par - PT at [facility] now completed \par - ordered compression pump \par \par # osteopenia \par - DEXA 8/2022 \par \par #anxiety \par - Improving \par - Xanax as needed \par - Emotional support given \par \par #splenic lesion\par - Incidental finding CT c/a/p - few lesions largest measuring 1.5cm.\par - Repeat CTA 08/2022 show stable in appearance- 1.2cm\par  - Stable since 2020 s/p repeat CT CAP 1/30/23 stable \par \par #insomnia\par - Improved \par \par #LLE swelling\par - 11/1/22- LLE duplex- no evidence of DVT \par - Lymphedema. \par - CT abd/pelvis scheduled 01/30/23 R gonadal/adnexa v thrombosis \par - Consider PT\par - not worse \par \par #hand foot syndrome \par - Likely r/t to Verzenio \par - Recommend bag balm\par - stable \par \par RTC in  8 weeks\par \par

## 2023-06-02 NOTE — HISTORY OF PRESENT ILLNESS
[de-identified] : This is a 67-year-old postmenopausal woman presenting with a new diagnosis of breast cancer\par Patient goes for regular routine screening mammograms and was initally  found to have architectural distortion at the right breast 12 o'clock position, ultrasound revealing for a 5 mm density she underwent a right breast  biopsy at [facility]  on 3/2019.  which showed atypical ductal hyperplasia and then underwent wide excision in March 2019 showing a radial sclerosing lesion but no further atypia or malignancy. Dr juarez. \par ? chemo prevention ? \par  Patient follows with Dr. Jessica Hi due to the high risk status and goes for regular mammo and MRI \par \par Routine screening mammogram on October 26, 2021 showed postsurgical changes right breast but there was a new ill-defined spiculated mass left breast 7 o'clock position measuring 2 cm there is also an enlarged left axillary lymph node measuring 4.4 x 1.3 cm ( this appeared benign  was not seen on  MRI) \par \par patient underwent a left breast ultrasound-guided core biopsy on November 5, 2021  which revealed an intermediate grade invasive  ductal carcinoma ER/VT strongly +95 to 100% HER-2/merrill negative Ki-67 between 40 and 50%.\par \par MRI performed on November 29, 2021 revealing for a biopsy-proven malignancy in her left breast up to 2.1 cm with suspicious linear nonmasslike enhancement extending to the anterior inferior index mass total suspicious area of enhancement is 3.3 cm also linear non-mass enhancement left breast at the central portion of the breast MRI guided biopsy was recommended as well suspicious nonletter masslike enhancement in the inner right breast measuring 4.7 cm MRI guided biopsy was recommended.\par \par MRI guided biopsy performed on December 9, 2021 revealing for the following left breast non-mass-like enhancement showed usual ductal hyperplasia\par \par Right breast non-mass-like enhancement lower inner quadrant revealed grade 2 invasive lobular carcinoma grade 2 out of 3 invasive carcinoma was present in multiple cores up to 12 mm LCIS was also present and focal atypical ductal hyperplasia\par Tumor was ER +80% VT +40% HER-2/merrill negative Ki-67 25%.\par \par Await genetic testing , monday.  9 genes\par Daughter is negative for BRCA \par \par Patient has extensive family history of cancer including a mother who had colon cancer a paternal grandmother who had breast cancer maternal cousin who had ovarian cancer and then breast cancer.  2 paternal cousins who had breast cancer her sister had a phyllodes \par \par Status post bilateral partial mastectomies with sentinel lymph node biopsy on January 21, 2022 with Dr. Jessica Hi.\par \par Right side-final pathological staging PT2N0(i) \par -Largest tumor 5 cm classic invasive lobular smaller tumor measuring 4 mm\par -Grade 2, no lymphovascular invasion\par -Margin less than 1 mm inferior\par -1 out of 2 lymph nodes revealing for isolated tumor cells\par -ER VT positive both at 99% HER-2 negative Ki-67 8%\par \par Left side - final path T2N1a\par Largest tumor 2.7 cm with satellite lesions measuring 1 mm and 2 mm invasive ductal carcinoma\par -Grade 2 no lymphovascular invasion\par 2 lymph nodes positive for macro metastases measuring 2.4 cm with extranodal extension\par 1 lymph node positive for micro met for a total of 3 out of 4 lymph nodes positive\par ER/VT +95% and 99%, HER-2 negative Ki-67 25%\par Margins were also close as well.\par \par Oncotype DX score on the left was low risk at 17 equating to a 15% risk of distant recurrence.\par Oncotype DX score on the right was low risk at 10 with a 3% risk of distant recurrence over the next 10 years.\par \par CT of the chest abdomen pelvis show hypoattenuation lesion in the spleen measuring 1.5 cm but no evidence of metastatic disease\par Bone scan negative for any evidence of metastatic disease.\par \par \par \par \par \par \par \par  [de-identified] : Patient seen and examined today for follow up. \par Plans to see plastic surgeon Dr Patricio next week  pre op to have expanders removed\par went to visit her new granddaughter in [place]. in early march\par She remains on Verzenio/Examestane. \par Tolerating well with lower dose of Verzenio\par  Diarrhea improved. She has frequent BM but normal consistency. \par She continues to follow with Dr. Teixeira\par next appt 08/2023. . \par on antibiotiv for sinus infection 5/31/2023\par Patient also has hx of LUE lymphedema, wears compression sleeve\par .  seeing PT twice a week . \par Arm is improved but still with swelling to the L hand. Wearing a glove. \par saw Dr. Patricio-plan for breast revision , will see him soon. \par  She was started on Eliquis. Now taking 5mg BID. Easy b\par saw Dr. pulido- pap smear normal . had TV ultrasound negative \par getting lymphedema pump for LUE \par She follows with Dr. Smith at [facility]. stress test normal.

## 2023-06-02 NOTE — PHYSICAL EXAM
[Fully active, able to carry on all pre-disease performance without restriction] : Status 0 - Fully active, able to carry on all pre-disease performance without restriction [Normal] : affect appropriate [de-identified] : b/l breast s/p partial mastectomy and expanders in place. no palpable masses nor lesions. left axilla with cording.   [de-identified] : moderate distention . normoactive bowel sounds. no palpable tenderness . no guarding upon percussion [de-identified] : LUE and LLE lymphedema. wears compression sleeve to LUE

## 2023-07-26 ENCOUNTER — RESULT REVIEW (OUTPATIENT)
Age: 68
End: 2023-07-26

## 2023-07-26 ENCOUNTER — APPOINTMENT (OUTPATIENT)
Dept: HEMATOLOGY ONCOLOGY | Facility: CLINIC | Age: 68
End: 2023-07-26
Payer: MEDICARE

## 2023-07-26 VITALS
SYSTOLIC BLOOD PRESSURE: 168 MMHG | RESPIRATION RATE: 18 BRPM | OXYGEN SATURATION: 99 % | WEIGHT: 212 LBS | TEMPERATURE: 97.9 F | BODY MASS INDEX: 33.27 KG/M2 | DIASTOLIC BLOOD PRESSURE: 83 MMHG | HEART RATE: 73 BPM | HEIGHT: 67 IN

## 2023-07-26 PROCEDURE — 36415 COLL VENOUS BLD VENIPUNCTURE: CPT

## 2023-07-26 PROCEDURE — 99214 OFFICE O/P EST MOD 30 MIN: CPT | Mod: 25

## 2023-07-26 NOTE — PHYSICAL EXAM
[Fully active, able to carry on all pre-disease performance without restriction] : Status 0 - Fully active, able to carry on all pre-disease performance without restriction [Normal] : affect appropriate [de-identified] : b/l breast s/p partial mastectomy and expanders in place. no palpable masses nor lesions. left axilla with cording.   [de-identified] : moderate distention . normoactive bowel sounds. no palpable tenderness . no guarding upon percussion [de-identified] : LUE and LLE lymphedema. wears compression sleeve to LUE

## 2023-07-26 NOTE — ASSESSMENT
[FreeTextEntry1] : This is a 67-year-old postmenopausal woman presenting with bilateral breast cancer.\par Routine screening mammogram revealing for new abnormality on the left biopsy revealing for invasive ductal carcinoma ER/VA strongly positive HER-2/merrill negative.  Initially there was an enlarged lymph node but this was thought to be negative due to appearance and the lack of visualization on MRI.  Then on MRI in November 2021 patient was found to have a contralateral breast mass on the right measuring 4.7 cm biopsy was revealing for invasive lobular carcinoma grade 2 ER/VA positive HER-2/merrill negative.  She is now planned for bilateral lumpectomies.\par \par #bilateral breast cancer \par - the right is in T2N 0 (i) with a 5 cm invasive lobular carcinoma and 2 lymph nodes positive for isolated tumor cells.  ER/VA strongly positive at 99% respectively HER-2 negative Ki-67 8%, grade 2 , no LVI \par -The left side is positive T2N1a for a 2.7 cm invasive ductal carcinoma grade 2 no lymphovascular invasion  2 macrometastases to the lymph node and 1 micrometastases for a grand total of 3 out of 4 this tumor is also ER/VA positive strongly at 95 and 99% respectively but the Ki-67 is higher at 25%\par -Oncotype DX score was reviewed today at length with the patient and her daughter on the left the score is low at 17 which equates to a 15% risk of distant recurrence in the next 10 years on the right the scores also low risk at 10 with a 3% risk of distant recurrence over the next 10 years.\par -Because both Oncotype's are low risk this would indicate that based on the molecular biology there would be no significant benefit to giving chemotherapy to reduce the risk of recurrence.  However in looking at the clinical risks of both cancers the left with the high Ki-67 and the 3+ lymph nodes clinically the patient is high risk.\par -Reviewed with patient that both MammaPrint sent on both the right and left cancers results also came back low risk a consistent with a lack of chemotherapy benefit.\par -Explained the difference between molecular phenotype low risk score and how these both can be incorporated.\par -Reviewed the final pathology from the bilateral mastectomy which shows residual cancer on the right and on the left there are no additional positive lymph nodes.  Therefore the final staging as was determined by the lumpectomy specimens is unchanged.\par -Again reviewed that in order for the molecular studies to be valid especially the Oncotype have to consider the Rxsponder trial and the enrollment criteria which included 3 or less positive lymph nodes.\par -Because the additional lymph node dissection did not show any additional positive lymph nodes the patient falls within this criteria.However I did explain that given the 2 primaries and especially given that the right side was over 5 cm she would considered a higher risk side clinically especially given that the left sided breast cancer was lymph node positive. Could consider giving chemotherapy in this situation however given that both Oncotype and MammaPrint would argue that there is no benefit to chemotherapy based on the molecular phenotype this must be taken into consideration.  Prior to molecular studies it would have been standard to give chemotherapy however now we know based on the molecular phenotype that chemotherapy may not be beneficial in this setting and that hormone therapy may be more beneficial.  In addition now we have the option of giving a CK4 6 inhibitor in the adjuvant setting for high risk patients that I believe this patient would fall into given the high Ki-67 and the large size and multiple positive lymph nodes.\par -we mutually decided to forgo chemotherapy and proceed with endocrine therapy \par -We discussed hormonal therapy at length including the mechanism efficacy and side effects.  We decided to go with letrozole, discussed the side effects of joint pains hot flashes and bone thinning.\par -In addition we discussed Verzenio, and the Evergig E data.  We discussed getting some efficacy and side effects including the risk of cytopenias, GI side effects, fibrosis.  \par - CT and Bone Scan negative for mets\par - She is s/p completion of radiation \par - Started Letrozole 5/2022- tolerated fairly well in the beginning. Now with persistent debilitating sxs, joint pain, weight gain, hot flashes. Recommended to hold for 1 week to see if symptoms resolve. \par -Verzenio started 7/2022. She is s/p ER visit for SOB, rash, congestion, and leg swelling. Records reviewed. Likely r/t to verzenio. 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 S/p 40mg prednisone with taper 08/26/22 and s/p completion of steroids on 9/30/22. Repeat CT chest 10/05/22 shows stable pneumonitis. Will consider lowering dose due to prior toxicity 100mg verzenio B.I.D. \par - Pt will start 100mg b.i.d verzenio on 11/5/22\par - Prior symptoms improved 1 week off of Letrozole. Will switch to Exemestane, started 11/2/22. \par - s/p L breast US to r/o seroma, revealing no evidence of malignancy, no fluid collection. pt continues to use \par - left axilla cording- improvement with PT at [place]. \par - LLE - edema, Oct 2022 doppler negative for DVT. recommend cont compression sleeve and PT . pt also ordered lymphedema pump for LUE. \par - had follow up with ( Dr. Patricio) on Jan 9 , 2023- plan for possible breast revision April 2023- May 2023?\par - Remain on exemestane and verzenio ( 100mg b.i.d). tolerating well. - continue\par - Ca 15-3 elevated, but stable. continue to monitor \par - Sx scheduled for 8/24/23- swap of the expanders and left axilla dissection.  Stop Verzenio a week before sx, Eliquis 48h before surgery\par \par #R gonadal/adnexa vein thrombosis \par - s/p CT CAP 1/30/23 incidental finding \par - started on Eliquis, now 5mg BID \par - Had eval with Dr Ly April 2023- TV ultrasound and pap smear completed. \par - reviewed with patient - side effect of Verzenio.\par - patient to stay on AC while on Verzenio \par -repeat CT pelvis May 2023. \par \par # Arthritis\par - increase physical activity \par #LUE lymphedema -Fall 2022, s/p RT \par - Continue compression sleeve \par - PT at [facility] now completed \par - ordered compression pump \par \par # osteopenia \par - DEXA 8/2022 \par \par #anxiety \par - Improving \par - Xanax as needed \par - Emotional support given \par \par #splenic lesion\par - Incidental finding CT c/a/p - few lesions largest measuring 1.5cm.\par - Repeat CTA 08/2022 show stable in appearance- 1.2cm\par  - Stable since 2020 s/p repeat CT CAP 1/30/23 stable \par \par #insomnia\par - Improved \par \par #LLE swelling\par - 11/1/22- LLE duplex- no evidence of DVT \par - Lymphedema. \par - CT abd/pelvis scheduled 01/30/23 R gonadal/adnexa v thrombosis \par - Consider PT\par - not worse \par \par #hand foot syndrome \par - Likely r/t to Verzenio \par - Recommend bag balm\par - stable \par \par RTC in  8 weeks\par \par

## 2023-07-26 NOTE — HISTORY OF PRESENT ILLNESS
[General Appearance - Well Developed] : well developed [de-identified] : This is a 67-year-old postmenopausal woman presenting with a new diagnosis of breast cancer\par Patient goes for regular routine screening mammograms and was initally  found to have architectural distortion at the right breast 12 o'clock position, ultrasound revealing for a 5 mm density she underwent a right breast  biopsy at [facility]  on 3/2019.  which showed atypical ductal hyperplasia and then underwent wide excision in March 2019 showing a radial sclerosing lesion but no further atypia or malignancy. Dr juarez. \par ? chemo prevention ? \par  Patient follows with Dr. Jessica Hi due to the high risk status and goes for regular mammo and MRI \par \par Routine screening mammogram on October 26, 2021 showed postsurgical changes right breast but there was a new ill-defined spiculated mass left breast 7 o'clock position measuring 2 cm there is also an enlarged left axillary lymph node measuring 4.4 x 1.3 cm ( this appeared benign  was not seen on  MRI) \par \par patient underwent a left breast ultrasound-guided core biopsy on November 5, 2021  which revealed an intermediate grade invasive  ductal carcinoma ER/ME strongly +95 to 100% HER-2/merrill negative Ki-67 between 40 and 50%.\par \par MRI performed on November 29, 2021 revealing for a biopsy-proven malignancy in her left breast up to 2.1 cm with suspicious linear nonmasslike enhancement extending to the anterior inferior index mass total suspicious area of enhancement is 3.3 cm also linear non-mass enhancement left breast at the central portion of the breast MRI guided biopsy was recommended as well suspicious nonletter masslike enhancement in the inner right breast measuring 4.7 cm MRI guided biopsy was recommended.\par \par MRI guided biopsy performed on December 9, 2021 revealing for the following left breast non-mass-like enhancement showed usual ductal hyperplasia\par \par Right breast non-mass-like enhancement lower inner quadrant revealed grade 2 invasive lobular carcinoma grade 2 out of 3 invasive carcinoma was present in multiple cores up to 12 mm LCIS was also present and focal atypical ductal hyperplasia\par Tumor was ER +80% ME +40% HER-2/merrill negative Ki-67 25%.\par \par Await genetic testing , monday.  9 genes\par Daughter is negative for BRCA \par \par Patient has extensive family history of cancer including a mother who had colon cancer a paternal grandmother who had breast cancer maternal cousin who had ovarian cancer and then breast cancer.  2 paternal cousins who had breast cancer her sister had a phyllodes \par \par Status post bilateral partial mastectomies with sentinel lymph node biopsy on January 21, 2022 with Dr. Jessica Hi.\par \par Right side-final pathological staging PT2N0(i) \par -Largest tumor 5 cm classic invasive lobular smaller tumor measuring 4 mm\par -Grade 2, no lymphovascular invasion\par -Margin less than 1 mm inferior\par -1 out of 2 lymph nodes revealing for isolated tumor cells\par -ER ME positive both at 99% HER-2 negative Ki-67 8%\par \par Left side - final path T2N1a\par Largest tumor 2.7 cm with satellite lesions measuring 1 mm and 2 mm invasive ductal carcinoma\par -Grade 2 no lymphovascular invasion\par 2 lymph nodes positive for macro metastases measuring 2.4 cm with extranodal extension\par 1 lymph node positive for micro met for a total of 3 out of 4 lymph nodes positive\par ER/ME +95% and 99%, HER-2 negative Ki-67 25%\par Margins were also close as well.\par \par Oncotype DX score on the left was low risk at 17 equating to a 15% risk of distant recurrence.\par Oncotype DX score on the right was low risk at 10 with a 3% risk of distant recurrence over the next 10 years.\par \par CT of the chest abdomen pelvis show hypoattenuation lesion in the spleen measuring 1.5 cm but no evidence of metastatic disease\par Bone scan negative for any evidence of metastatic disease.\par \par \par \par \par \par \par \par  [Normal Appearance] : normal appearance [de-identified] : Patient seen and examined today for follow up. \par She remains on Verzenio/Examestane. \par Tolerating well with lower dose of Verzenio\par Having diarrhes- not taking metamucil or immodium\par She continues to follow with Dr. Teixeira\par \par Started with hot flashed over the past 2 weeks\par Started with GERD sx's as well  \par \par Having expanders removed with implants placed and LN dissection on 8/24/23\par \par \par  [Well Groomed] : well groomed [General Appearance - Well Nourished] : well nourished [No Deformities] : no deformities [General Appearance - In No Acute Distress] : no acute distress [Normal Conjunctiva] : the conjunctiva exhibited no abnormalities [Eyelids - No Xanthelasma] : the eyelids demonstrated no xanthelasmas [Normal Oral Mucosa] : normal oral mucosa [No Oral Pallor] : no oral pallor [No Oral Cyanosis] : no oral cyanosis [Normal Jugular Venous A Waves Present] : normal jugular venous A waves present [Normal Jugular Venous V Waves Present] : normal jugular venous V waves present [No Jugular Venous Ruiz A Waves] : no jugular venous ruiz A waves [Respiration, Rhythm And Depth] : normal respiratory rhythm and effort [Exaggerated Use Of Accessory Muscles For Inspiration] : no accessory muscle use [Auscultation Breath Sounds / Voice Sounds] : lungs were clear to auscultation bilaterally [Heart Rate And Rhythm] : heart rate and rhythm were normal [Heart Sounds] : normal S1 and S2 [Murmurs] : no murmurs present [Abdomen Soft] : soft [Abdomen Tenderness] : non-tender [Abdomen Mass (___ Cm)] : no abdominal mass palpated [Abnormal Walk] : normal gait [Gait - Sufficient For Exercise Testing] : the gait was sufficient for exercise testing [Nail Clubbing] : no clubbing of the fingernails [Cyanosis, Localized] : no localized cyanosis [Petechial Hemorrhages (___cm)] : no petechial hemorrhages [] : no ischemic changes [Oriented To Time, Place, And Person] : oriented to person, place, and time [Affect] : the affect was normal [Mood] : the mood was normal [No Anxiety] : not feeling anxious

## 2023-07-26 NOTE — REVIEW OF SYSTEMS
[Joint Stiffness] : joint stiffness [Depression] : depression [Hot Flashes] : hot flashes [Easy Bruising] : a tendency for easy bruising [Negative] : Allergic/Immunologic [Fever] : no fever [Chills] : no chills [Night Sweats] : no night sweats [Fatigue] : no fatigue [Shortness Of Breath] : no shortness of breath [Wheezing] : no wheezing [Cough] : no cough [SOB on Exertion] : no shortness of breath during exertion [Abdominal Pain] : no abdominal pain [Vomiting] : no vomiting [Constipation] : no constipation [Joint Pain] : no joint pain [Muscle Pain] : no muscle pain [Muscle Weakness] : no muscle weakness [Difficulty Walking] : no difficulty walking [Insomnia] : no insomnia [Anxiety] : no anxiety [FreeTextEntry2] : sleep is better [FreeTextEntry3] : wears glasses [FreeTextEntry7] : frequency of BM not diarrhea or constipation. RLQ pain resolved  [de-identified] : +L arm lymphedema improved with compression stocking but L hand swelling, wears glove  [de-identified] :  on Eliquis

## 2023-07-27 LAB
CANCER AG27-29 SERPL-ACNC: 47 U/ML
CEA SERPL-MCNC: 4 NG/ML

## 2023-08-16 NOTE — HISTORY OF PRESENT ILLNESS
[FreeTextEntry1] : The patient is a 68-year-old G3, P3 postmenopausal white female.  She underwent menarche at age 11 and had her first child at age 24.  She underwent menopause at age 54 and never took any hormone replacement therapy.  She has a family history with her mother who had colon cancer and passed away at age 72.  Her paternal grandmother passed away from breast cancer in her 60s.  A maternal cousin had ovarian cancer at age 55 and then breast cancer at age 63 and tested BRCA negative.  2 paternal cousins had breast cancer at ages 45 and age 50.  Both tested BRCA negative.  Her sister had a phyllodes tumor excised.  I take care of both her sisters, Racheal Stevenson and Mrs. Dunn.  She comes in now and was found to have some architectural distortion in the 12:00 region of the right breast on mammography and ultrasound showed a 5 mm corresponding density.  Ultrasound-guided right breast 12:00 core biopsy at [facility] showed some atypical duct hyperplasia and she underwent a wide excision on March 27, 2019 showing a radial sclerosing lesion but no further atypia.  She has a Caren model lifetime risk of 15.7% and 5-year risk of 4.1%. She underwent routine mammography and ultrasound at [facility] on October 26, 2021 showing postsurgical changes in the right breast  but there was a new ill-defined spiculated mass in the left breast lower inner 7:00 region measuring about 2 cm on mammography and 1.4 x 1.7 x 1.2 cm on ultrasound in the 7:00 region 6 cm from the nipple.  There was also an enlarged left lower axillary lymph node measuring 4.4 x 1.3 cm.  She underwent a left breast ultrasound-guided core biopsy on November 5, 2021 with the placement of a "cylindrical" shaped clip and pathology showing an intermediate grade 1.2 cm invasive duct cancer which was ER/SC strongly positive at 95 to 100% and HER-2/merrill negative by IHC with a Ki-67 between 40 and 50%.  MRI performed on November 29, 2021 showed another suspicious linear area of nonmasslike enhancement in the central left breast as well as an area of non-mass-like enhancement in the right breast lower inner and central region measuring about 4.7 cm.  She underwent bilateral MRI guided core biopsies performed on December 9, 2021 and the left central region showed a sclerosed papillary lesion.  The right breast lower inner quadrant showed an intermediate grade invasive lobular cancer which was ER positive at 80%, SC positive at 40%, HER-2/merrill negative by IHC with a Ki-67 of 25%.  [facility] genetic panel testing was performed preoperatively and was negative.  She was seen by Dr. Sangeeta Jones preoperatively.  She underwent localizations preoperatively and then underwent bilateral partial mastectomies with bilateral mastopexies and tissue rearrangement by Dr. Patricio with bilateral sentinel lymph nodes on January 21, 2022.  The final pathology on the left showed an invasive duct carcinoma with 4 separate satellite lesions with the main tumor measuring 2.7 cm and some surrounding DCIS but she had close margins on initial excision especially on the superior aspect but had a positive inferior margin on the further margin excisions but had 3 sentinel lymph nodes 2 with macrometastasis with some extranodal extension and the third sentinel node with a micrometastasis measuring 0.3 mm and a fourth nonsentinel lymph node which was negative making this a pathologic anatomic stage IIA but pathologic prognostic stage IA breast cancer (ER/SC positive greater than 95% and HER-2/merrill negative with a Ki-67 of 25%).  The separate left breast excision of the 12:00 sclerosing papilloma showed no residual lesion and was benign showing biopsy site changes.  The right breast partial mastectomy showed a classical invasive lobular cancer measuring 5 cm and she had 2 sentinel lymph nodes 1 with some isolated tumor cells in the initial wide excision showed a close medial margin but formal margins showed a positive inferior margin with a classical invasive lobular cancer making this a pathologic anatomic stage IIA but pathologic prognostic stage IA breast cancer (ER/SC positive at 99% and HER-2/merrill negative with a Ki-67 of 8%).  She underwent Oncotype recurrence scores by Dr. Jones and the right lobular cancer had a recurrence score of 10 and the left node positive ductal cancer had a recurrence score of 17.  She was entered into the FLEX trial and MammaPrint showed a low risk luminal A subtype on each bilateral cancer.  After discussion with the medical oncologist, it was decided to go forward with bilateral mastectomies with left axillary lymph node dissection and she had bilateral prepectoral expander reconstructions with AlloDerm performed on April 11, 2022. The final pathology showed no further cancer in either mastectomy specimen and she had 9 negative left axillary lymph nodes however a separate lower inner quadrant anterior margin on the right side showed a 5 mm focus of invasive lobular cancer which focally extended to the anterior margin.  She underwent bilateral chest wall radiation through Dr. Mccullough which finished in July 2022.  She is currently on letrozole and Verzenio.  She comes in for routine follow-up.

## 2023-08-16 NOTE — PHYSICAL EXAM
[Normocephalic] : normocephalic [Atraumatic] : atraumatic [EOMI] : extra ocular movement intact [Supple] : supple [No Supraclavicular Adenopathy] : no supraclavicular adenopathy [No Cervical Adenopathy] : no cervical adenopathy [Examined in the supine and seated position] : examined in the supine and seated position [Breast Mass Right Breast ___cm] : no masses [Breast Mass Left Breast ___cm] : no masses [No Axillary Lymphadenopathy] : no left axillary lymphadenopathy [No Edema] : no edema [No Rashes] : no rashes [No Ulceration] : no ulceration [de-identified] : On exam, the patient has well after her bilateral total mastectomies with left axillary lymph node dissection and bilateral prepectoral expander reconstructions.  She did undergo bilateral chest wall radiation and has radiation changes bilaterally.  On palpation, I cannot feel any suspicious densities over either expander. [de-identified] : Status post lower inner quadrant partial mastectomy with tissue rearrangement oncoplastic reduction mastopexy and sentinel lymph node biopsy followed by later completion mastectomy for positive margins and prepectoral expander reconstruction and then postmastectomy chest wall radiation with no suspicious findings. [de-identified] : Status post lower inner quadrant partial mastectomy with tissue rearrangement oncoplastic reduction mastopexy and sentinel lymph node biopsy followed by completion total mastectomy with left axillary lymph node dissection and prepectoral expander reconstruction due to positive margins and positive sentinel lymph nodes.  She then had chest wall radiation and has radiation changes but no suspicious findings.

## 2023-08-16 NOTE — REASON FOR VISIT
[Follow-Up: _____] : a [unfilled] follow-up visit [FreeTextEntry1] : The patient comes in with a strong family history of breast cancer and ovarian cancer and a personal history of undergoing a right breast 12:00 ultrasound-guided core biopsy showing atypical duct hyperplasia and then a wide excision in March 2019 just showing a radial sclerosing lesion with no further atypia.  She has a Caren model risk of 15.7%.  She was then found to have an abnormal mammography and ultrasound  in October 2021 with a suspicious approximately 2 cm in the left breast lower inner quadrant density which was biopsied on November 5, 2021 showing intermediate grade invasive duct cancer which was ER/SC strongly positive and HER-2/merrill negative with a Ki-67 between 40 and 50%.  She underwent an MRI November 2021 and was found to have a suspicious area in the lower inner aspect of the right breast as well as another area of enhancement in the central left breast and MRI core biopsy showed a right breast lower inner quadrant density to be an intermediate grade invasive lobular cancer which was ER/SC positive HER-2/merrill negative with a Ki-67 of 25%.  The central left breast biopsy showed a sclerosed papillary lesion.  Genetic testing was negative.  She underwent bilateral partial mastectomies with bilateral mastopexies and tissue rearrangement by Dr. Patricio with bilateral sentinel lymph node biopsies in January 2022.  Final pathology showed 4 separate satellite lesions in the left breast which was a ductal cancer with the largest focus measuring 2.7 cm but she had a positive inferior margin and had 3 sentinel nodes 2 with macrometastasis with extranodal extension and the third sentinel node with a micrometastasis and 1 negative nonsentinel lymph node making this a pathologic prognostic stage IB breast cancer.  The right breast partial mastectomy showed a 5 cm classical invasive lobular cancer with 2 sentinel nodes 1 with some isolated tumor cells and she also had a positive inferior margin with lobular cancer making this a pathologic prognostic stage IA breast cancer.  She had bilateral positive inferior margins and macrometastasis in the left axillary sentinel lymph nodes.  She underwent Oncotype recurrence scores by Dr. Jones and the right lobular cancer had a recurrence score of 10 and the left node positive ductal cancer had a recurrence score of 17.  She was entered into the FLEX trial and MammaPrint showed a low risk luminal A subtype.  It was decided that she undergo bilateral mastectomies for the positive margins and a left axillary lymph node dissection which was performed on April 11, 2022 prior to any chemotherapy decision.  The final pathology showed no further cancer in either mastectomy specimen and she had 9 negative left axillary lymph nodes however a separate lower inner quadrant anterior margin on the right side showed a 5 mm focus of invasive lobular cancer which focally extended to the anterior margin.  She was seen by radiation and medical oncology and received bilateral chest wall radiation and was then placed on Femara and Verzenio.  She comes in now for routine follow-up.

## 2023-08-16 NOTE — ASSESSMENT
[FreeTextEntry1] : The patient is a 68-year-old G3, P3 postmenopausal white female.  She underwent menarche at age 11 and had her first child at age 24.  She underwent menopause at age 54 and never took any hormone replacement therapy.  She has a family history with her mother who had colon cancer and passed away at age 72.  Her paternal grandmother passed away from breast cancer in her 60s.  A maternal cousin had ovarian cancer at age 55 and then breast cancer at age 63 and tested BRCA negative.  2 paternal cousins had breast cancer at ages 45 and age 50.  Both tested BRCA negative.  Her sister had a phyllodes tumor excised.  I take care of both her sisters, Racheal Stevenson and Mrs. Dunn.  She was found to have some architectural distortion in the 12:00 region of the right breast on mammography and ultrasound showed a 5 mm corresponding density.  Ultrasound-guided right breast 12:00 core biopsy at [facility] showed some atypical duct hyperplasia and she underwent a wide excision on March 27, 2019 showing a radial sclerosing lesion but no further atypia.  She has a Caren model lifetime risk of 15.7% and 5-year risk of 4.1%.  She underwent routine mammography and ultrasound at [facility] on October 26, 2021 showing postsurgical changes in the right breast  but there was a new ill-defined spiculated mass in the left breast lower inner 7:00 region measuring about 2 cm on mammography and 1.4 x 1.7 x 1.2 cm on ultrasound in the 7:00 region 6 cm from the nipple.  There was also an enlarged left lower axillary lymph node measuring 4.4 x 1.3 cm.  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  On exam, at that time, I could not feel any suspicious densities in either breast even though close attention to the left breast 7:00 region. I reviewed her outside ultrasound from [facility] as well as her pathology and this was an intermediate grade invasive duct cancer which appeared localized on ultrasound.  There was an enlarged lymph node underneath the left axilla but this really appeared benign and just enlarged.  MRI performed on November 29, 2021 showed another suspicious linear area of nonmasslike enhancement in the central left breast as well as an area of non-mass-like enhancement in the right breast lower inner and central region measuring about 4.7 cm.  She underwent bilateral MRI guided core biopsies performed on December 9, 2021 and the left central region showed a sclerosed papillary lesion.  The right breast lower inner quadrant showed an intermediate grade invasive lobular cancer which was ER positive at 80%, ND positive at 40%, HER-2/merrill negative by IHC with a Ki-67 of 25%.  She was seen by Dr. Sangeeta Jones.  [facility] genetic panel testing was sent and was negative.  She understood the equivalent survival rates with lumpectomy and radiation versus mastectomy and  I spoke to her about the benefits of endocrine therapy given her hormone positive status as well.  Risk/benefits of proposed surgeries were also explained to the patient.  She decided to go forward with bilateral partial mastectomies and was seen by Dr. Patricio to undergo bilateral mastopexies at the same sitting.   She underwent localizations preoperatively and then underwent bilateral partial mastectomies with bilateral mastopexies and tissue rearrangement by Dr. Patricio with bilateral sentinel lymph nodes on January 21, 2022.  The final pathology on the left showed an invasive duct carcinoma with 4 separate satellite lesions with the main tumor measuring 2.7 cm and some surrounding DCIS but she had close margins on initial excision especially on the superior aspect but had a positive inferior margin on the further margin excisions but had 3 sentinel lymph nodes 2 with macrometastasis with some extranodal extension and the third sentinel node with a micrometastasis measuring 0.3 mm and a fourth nonsentinel lymph node which was negative making this a pathologic anatomic stage IIA but pathologic prognostic stage IA breast cancer (ER/ND positive greater than 95% and HER-2/merrill negative with a Ki-67 of 25%).  The separate left breast excision of the 12:00 sclerosing papilloma showed no residual lesion and was benign showing biopsy site changes.  The right breast partial mastectomy showed a classical invasive lobular cancer measuring 5 cm and she had 2 sentinel lymph nodes 1 with some isolated tumor cells in the initial wide excision showed a close medial margin but formal margins showed a positive inferior margin with a classical invasive lobular cancer making this a pathologic anatomic stage IIA but pathologic prognostic stage IA breast cancer (ER/ND positive at 99% and HER-2/merrill negative with a Ki-67 of 8%).  The patient has was seen Dr. Mccullough from radiation oncology and Dr. Jones.  She underwent Oncotype recurrence scores by Dr. Jones and the right lobular cancer had a recurrence score of 10 and the left node positive ductal cancer had a recurrence score of 17.  She was entered into the FLEX trial and MammaPrint as part of the FLEX trial showed a low risk luminal A subtype on both bilateral cancers.  I had a discussion with Dr. Jones and we decided to move forward with surgery given the improved prognostic genomic profiles.  Due to the difficulty in determining the inferior margin as well as the multifocal nature is of her cancers, I recommended bilateral mastectomies and advised removal of both nipple areolar complexes and using the reduction mastopexy incisions.  She was seen Dr. Patricio in regards to reconstruction options.  She did have a second opinion down at [facility] who agreed with the axillary lymph node dissection but they felt that she could possibly just undergo radiation at this point but given the multifocal nature of this bilateral breast cancer I was uncomfortable with just radiation given the margin status.  She underwent the surgery with bilateral total mastectomies and left axillary lymph node dissection and she had bilateral prepectoral expander reconstructions with AlloDerm by Dr. Patricio performed on April 11, 2022.  The final pathology showed no further cancer in either mastectomy specimen and she had 9 negative left axillary lymph nodes however a separate lower inner quadrant anterior margin on the right side showed a 5 mm focus of invasive lobular cancer which focally extended to the anterior margin.  The patient followed up with Dr. Sangeeta Jones and MammaPrints were sent on the bilateral tumors which turned out to be low risk luminal A types.  She was recommended to start letrozole and Verzenio after radiation.  She did undergo bilateral external beam chest wall radiation with Dr. Mccullough which finished in July 2022.  On exam today, she has healed well and no evidence of any recurrence over either prepectoral expander and has tolerated chest wall radiation well.  She continues to follow up with Dr. Patricio from a cosmetic standpoint and is planning exchange of the expanders likely in April 2023 ??????.  I would like to see her again in another 6 months for routine follow-up.  She did receive lymphedema therapy due to the radiation and axillary lymph node dissection on the left side and is wearing a lymphedema sleeve on the left.  She should continue to follow-up with Dr. Bryant and remain on Femara and Verzenio.  She was having a fair amount of diarrhea on the Verzenio but this is now better controlled.

## 2023-08-23 ENCOUNTER — APPOINTMENT (OUTPATIENT)
Dept: BREAST CENTER | Facility: CLINIC | Age: 68
End: 2023-08-23

## 2023-08-23 ENCOUNTER — TRANSCRIPTION ENCOUNTER (OUTPATIENT)
Age: 68
End: 2023-08-23

## 2023-09-05 DIAGNOSIS — R97.8 OTHER ABNORMAL TUMOR MARKERS: ICD-10-CM

## 2023-09-06 NOTE — REVIEW OF SYSTEMS
[Fever] : no fever [Chills] : no chills [Night Sweats] : no night sweats [Fatigue] : no fatigue [Shortness Of Breath] : no shortness of breath [Wheezing] : no wheezing [Cough] : no cough [SOB on Exertion] : no shortness of breath during exertion [Abdominal Pain] : no abdominal pain [Vomiting] : no vomiting [Constipation] : no constipation [Joint Pain] : no joint pain [Joint Stiffness] : joint stiffness [Muscle Pain] : no muscle pain [Muscle Weakness] : no muscle weakness [Difficulty Walking] : no difficulty walking [Insomnia] : no insomnia [Anxiety] : no anxiety [Depression] : depression [Hot Flashes] : hot flashes [Easy Bruising] : a tendency for easy bruising [Negative] : Allergic/Immunologic [FreeTextEntry2] : sleep is better [FreeTextEntry3] : wears glasses [FreeTextEntry7] : frequency of BM not diarrhea or constipation. RLQ pain resolved  [de-identified] : +L arm lymphedema improved with compression stocking but L hand swelling, wears glove  [de-identified] :  on Eliquis

## 2023-09-06 NOTE — ASSESSMENT
[FreeTextEntry1] : This is a 67-year-old postmenopausal woman presenting with bilateral breast cancer.\par  Routine screening mammogram revealing for new abnormality on the left biopsy revealing for invasive ductal carcinoma ER/NY strongly positive HER-2/merrill negative.  Initially there was an enlarged lymph node but this was thought to be negative due to appearance and the lack of visualization on MRI.  Then on MRI in November 2021 patient was found to have a contralateral breast mass on the right measuring 4.7 cm biopsy was revealing for invasive lobular carcinoma grade 2 ER/NY positive HER-2/merrill negative.  She is now planned for bilateral lumpectomies.\par  \par  #bilateral breast cancer \par  - the right is in T2N 0 (i) with a 5 cm invasive lobular carcinoma and 2 lymph nodes positive for isolated tumor cells.  ER/NY strongly positive at 99% respectively HER-2 negative Ki-67 8%, grade 2 , no LVI \par  -The left side is positive T2N1a for a 2.7 cm invasive ductal carcinoma grade 2 no lymphovascular invasion  2 macrometastases to the lymph node and 1 micrometastases for a grand total of 3 out of 4 this tumor is also ER/NY positive strongly at 95 and 99% respectively but the Ki-67 is higher at 25%\par  -Oncotype DX score was reviewed today at length with the patient and her daughter on the left the score is low at 17 which equates to a 15% risk of distant recurrence in the next 10 years on the right the scores also low risk at 10 with a 3% risk of distant recurrence over the next 10 years.\par  -Because both Oncotype's are low risk this would indicate that based on the molecular biology there would be no significant benefit to giving chemotherapy to reduce the risk of recurrence.  However in looking at the clinical risks of both cancers the left with the high Ki-67 and the 3+ lymph nodes clinically the patient is high risk.\par  -Reviewed with patient that both MammaPrint sent on both the right and left cancers results also came back low risk a consistent with a lack of chemotherapy benefit.\par  -Explained the difference between molecular phenotype low risk score and how these both can be incorporated.\par  -Reviewed the final pathology from the bilateral mastectomy which shows residual cancer on the right and on the left there are no additional positive lymph nodes.  Therefore the final staging as was determined by the lumpectomy specimens is unchanged.\par  -Again reviewed that in order for the molecular studies to be valid especially the Oncotype have to consider the Rxsponder trial and the enrollment criteria which included 3 or less positive lymph nodes.\par  -Because the additional lymph node dissection did not show any additional positive lymph nodes the patient falls within this criteria.However I did explain that given the 2 primaries and especially given that the right side was over 5 cm she would considered a higher risk side clinically especially given that the left sided breast cancer was lymph node positive. Could consider giving chemotherapy in this situation however given that both Oncotype and MammaPrint would argue that there is no benefit to chemotherapy based on the molecular phenotype this must be taken into consideration.  Prior to molecular studies it would have been standard to give chemotherapy however now we know based on the molecular phenotype that chemotherapy may not be beneficial in this setting and that hormone therapy may be more beneficial.  In addition now we have the option of giving a CK4 6 inhibitor in the adjuvant setting for high risk patients that I believe this patient would fall into given the high Ki-67 and the large size and multiple positive lymph nodes.\par  -we mutually decided to forgo chemotherapy and proceed with endocrine therapy \par  -We discussed hormonal therapy at length including the mechanism efficacy and side effects.  We decided to go with letrozole, discussed the side effects of joint pains hot flashes and bone thinning.\par  -In addition we discussed Verzenio, and the Hexaformer E data.  We discussed getting some efficacy and side effects including the risk of cytopenias, GI side effects, fibrosis.  \par  - CT and Bone Scan negative for mets\par  - She is s/p completion of radiation \par  - Started Letrozole 5/2022- tolerated fairly well in the beginning. Now with persistent debilitating sxs, joint pain, weight gain, hot flashes. Recommended to hold for 1 week to see if symptoms resolve. \par  -Verzenio started 7/2022. She is s/p ER visit for SOB, rash, congestion, and leg swelling. Records reviewed. Likely r/t to verzenio. 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 S/p 40mg prednisone with taper 08/26/22 and s/p completion of steroids on 9/30/22. Repeat CT chest 10/05/22 shows stable pneumonitis. Will consider lowering dose due to prior toxicity 100mg verzenio B.I.D. \par  - Pt will start 100mg b.i.d verzenio on 11/5/22\par  - Prior symptoms improved 1 week off of Letrozole. Will switch to Exemestane, started 11/2/22. \par  - s/p L breast US to r/o seroma, revealing no evidence of malignancy, no fluid collection. pt continues to use \par  - left axilla cording- improvement with PT at [place]. \par  - LLE - edema, Oct 2022 doppler negative for DVT. recommend cont compression sleeve and PT . pt also ordered lymphedema pump for LUE. \par  - had follow up with ( Dr. Patricio) on Jan 9 , 2023- plan for possible breast revision April 2023- May 2023?\par  - Remain on exemestane and verzenio ( 100mg b.i.d). tolerating well. - continue\par  - Ca 15-3 elevated, but stable. continue to monitor \par  - Sx scheduled for 8/24/23- swap of the expanders and left axilla dissection.  Stop Verzenio a week before sx, Eliquis 48h before surgery\par  \par  #R gonadal/adnexa vein thrombosis \par  - s/p CT CAP 1/30/23 incidental finding \par  - started on Eliquis, now 5mg BID \par  - Had eval with Dr Ly April 2023- TV ultrasound and pap smear completed. \par  - reviewed with patient - side effect of Verzenio.\par  - patient to stay on AC while on Verzenio \par  -repeat CT pelvis May 2023. \par  \par  # Arthritis\par  - increase physical activity \par  #LUE lymphedema -Fall 2022, s/p RT \par  - Continue compression sleeve \par  - PT at [facility] now completed \par  - ordered compression pump \par  \par  # osteopenia \par  - DEXA 8/2022 \par  \par  #anxiety \par  - Improving \par  - Xanax as needed \par  - Emotional support given \par  \par  #splenic lesion\par  - Incidental finding CT c/a/p - few lesions largest measuring 1.5cm.\par  - Repeat CTA 08/2022 show stable in appearance- 1.2cm\par   - Stable since 2020 s/p repeat CT CAP 1/30/23 stable \par  \par  #insomnia\par  - Improved \par  \par  #LLE swelling\par  - 11/1/22- LLE duplex- no evidence of DVT \par  - Lymphedema. \par  - CT abd/pelvis scheduled 01/30/23 R gonadal/adnexa v thrombosis \par  - Consider PT\par  - not worse \par  \par  #hand foot syndrome \par  - Likely r/t to Verzenio \par  - Recommend bag balm\par  - stable \par  \par  RTC in  8 weeks\par  \par

## 2023-09-06 NOTE — HISTORY OF PRESENT ILLNESS
[de-identified] : This is a 67-year-old postmenopausal woman presenting with a new diagnosis of breast cancer\par  Patient goes for regular routine screening mammograms and was initally  found to have architectural distortion at the right breast 12 o'clock position, ultrasound revealing for a 5 mm density she underwent a right breast  biopsy at [facility]  on 3/2019.  which showed atypical ductal hyperplasia and then underwent wide excision in March 2019 showing a radial sclerosing lesion but no further atypia or malignancy. Dr juarez. \par  ? chemo prevention ? \par   Patient follows with Dr. Jessica Hi due to the high risk status and goes for regular mammo and MRI \par  \par  Routine screening mammogram on October 26, 2021 showed postsurgical changes right breast but there was a new ill-defined spiculated mass left breast 7 o'clock position measuring 2 cm there is also an enlarged left axillary lymph node measuring 4.4 x 1.3 cm ( this appeared benign  was not seen on  MRI) \par  \par  patient underwent a left breast ultrasound-guided core biopsy on November 5, 2021  which revealed an intermediate grade invasive  ductal carcinoma ER/IA strongly +95 to 100% HER-2/merrill negative Ki-67 between 40 and 50%.\par  \par  MRI performed on November 29, 2021 revealing for a biopsy-proven malignancy in her left breast up to 2.1 cm with suspicious linear nonmasslike enhancement extending to the anterior inferior index mass total suspicious area of enhancement is 3.3 cm also linear non-mass enhancement left breast at the central portion of the breast MRI guided biopsy was recommended as well suspicious nonletter masslike enhancement in the inner right breast measuring 4.7 cm MRI guided biopsy was recommended.\par  \par  MRI guided biopsy performed on December 9, 2021 revealing for the following left breast non-mass-like enhancement showed usual ductal hyperplasia\par  \par  Right breast non-mass-like enhancement lower inner quadrant revealed grade 2 invasive lobular carcinoma grade 2 out of 3 invasive carcinoma was present in multiple cores up to 12 mm LCIS was also present and focal atypical ductal hyperplasia\par  Tumor was ER +80% IA +40% HER-2/merrill negative Ki-67 25%.\par  \par  Await genetic testing , monday.  9 genes\par  Daughter is negative for BRCA \par  \par  Patient has extensive family history of cancer including a mother who had colon cancer a paternal grandmother who had breast cancer maternal cousin who had ovarian cancer and then breast cancer.  2 paternal cousins who had breast cancer her sister had a phyllodes \par  \par  Status post bilateral partial mastectomies with sentinel lymph node biopsy on January 21, 2022 with Dr. Jessica Hi.\par  \par  Right side-final pathological staging PT2N0(i) \par  -Largest tumor 5 cm classic invasive lobular smaller tumor measuring 4 mm\par  -Grade 2, no lymphovascular invasion\par  -Margin less than 1 mm inferior\par  -1 out of 2 lymph nodes revealing for isolated tumor cells\par  -ER IA positive both at 99% HER-2 negative Ki-67 8%\par  \par  Left side - final path T2N1a\par  Largest tumor 2.7 cm with satellite lesions measuring 1 mm and 2 mm invasive ductal carcinoma\par  -Grade 2 no lymphovascular invasion\par  2 lymph nodes positive for macro metastases measuring 2.4 cm with extranodal extension\par  1 lymph node positive for micro met for a total of 3 out of 4 lymph nodes positive\par  ER/IA +95% and 99%, HER-2 negative Ki-67 25%\par  Margins were also close as well.\par  \par  Oncotype DX score on the left was low risk at 17 equating to a 15% risk of distant recurrence.\par  Oncotype DX score on the right was low risk at 10 with a 3% risk of distant recurrence over the next 10 years.\par  \par  CT of the chest abdomen pelvis show hypoattenuation lesion in the spleen measuring 1.5 cm but no evidence of metastatic disease\par  Bone scan negative for any evidence of metastatic disease.\par  \par  \par  \par  \par  \par  \par  \par   [de-identified] : Patient seen and examined today for follow up. \par  She remains on Verzenio/Examestane. \par  Tolerating well with lower dose of Verzenio\par  Having diarrhes- not taking metamucil or immodium\par  She continues to follow with Dr. Teixeira\par  \par  Started with hot flashed over the past 2 weeks\par  Started with GERD sx's as well  \par  \par  Having expanders removed with implants placed and LN dissection on 8/24/23\par  \par  \par

## 2023-09-06 NOTE — PHYSICAL EXAM
[Fully active, able to carry on all pre-disease performance without restriction] : Status 0 - Fully active, able to carry on all pre-disease performance without restriction [Normal] : affect appropriate [de-identified] : b/l breast s/p partial mastectomy and expanders in place. no palpable masses nor lesions. left axilla with cording.   [de-identified] : moderate distention . normoactive bowel sounds. no palpable tenderness . no guarding upon percussion [de-identified] : LUE and LLE lymphedema. wears compression sleeve to LUE

## 2023-09-13 ENCOUNTER — RESULT REVIEW (OUTPATIENT)
Age: 68
End: 2023-09-13

## 2023-09-13 ENCOUNTER — APPOINTMENT (OUTPATIENT)
Dept: HEMATOLOGY ONCOLOGY | Facility: CLINIC | Age: 68
End: 2023-09-13
Payer: MEDICARE

## 2023-09-13 VITALS
WEIGHT: 214 LBS | HEIGHT: 67 IN | DIASTOLIC BLOOD PRESSURE: 88 MMHG | RESPIRATION RATE: 18 BRPM | SYSTOLIC BLOOD PRESSURE: 144 MMHG | OXYGEN SATURATION: 98 % | TEMPERATURE: 97.8 F | HEART RATE: 58 BPM | BODY MASS INDEX: 33.59 KG/M2

## 2023-09-13 PROCEDURE — 99213 OFFICE O/P EST LOW 20 MIN: CPT

## 2023-09-13 RX ORDER — UBIDECARENONE/VIT E ACET 100MG-5
50 MCG CAPSULE ORAL
Refills: 0 | Status: COMPLETED | COMMUNITY
End: 2023-09-13

## 2023-09-13 RX ORDER — MAGNESIUM OXIDE/MAG AA CHELATE 300 MG
CAPSULE ORAL
Refills: 0 | Status: ACTIVE | COMMUNITY

## 2023-10-06 DIAGNOSIS — E55.9 VITAMIN D DEFICIENCY, UNSPECIFIED: ICD-10-CM

## 2023-10-11 ENCOUNTER — RESULT REVIEW (OUTPATIENT)
Age: 68
End: 2023-10-11

## 2023-10-11 ENCOUNTER — APPOINTMENT (OUTPATIENT)
Dept: HEMATOLOGY ONCOLOGY | Facility: CLINIC | Age: 68
End: 2023-10-11
Payer: MEDICARE

## 2023-10-11 VITALS
HEIGHT: 67 IN | WEIGHT: 213 LBS | HEART RATE: 66 BPM | DIASTOLIC BLOOD PRESSURE: 80 MMHG | BODY MASS INDEX: 33.43 KG/M2 | OXYGEN SATURATION: 100 % | SYSTOLIC BLOOD PRESSURE: 135 MMHG | TEMPERATURE: 97.6 F | RESPIRATION RATE: 16 BRPM

## 2023-10-11 PROCEDURE — 99214 OFFICE O/P EST MOD 30 MIN: CPT | Mod: 25

## 2023-10-11 PROCEDURE — 36415 COLL VENOUS BLD VENIPUNCTURE: CPT

## 2023-11-09 ENCOUNTER — RESULT REVIEW (OUTPATIENT)
Age: 68
End: 2023-11-09

## 2023-11-09 ENCOUNTER — APPOINTMENT (OUTPATIENT)
Dept: HEMATOLOGY ONCOLOGY | Facility: CLINIC | Age: 68
End: 2023-11-09

## 2023-11-09 VITALS
HEART RATE: 75 BPM | RESPIRATION RATE: 16 BRPM | OXYGEN SATURATION: 96 % | WEIGHT: 210 LBS | BODY MASS INDEX: 32.96 KG/M2 | HEIGHT: 67 IN | SYSTOLIC BLOOD PRESSURE: 158 MMHG | DIASTOLIC BLOOD PRESSURE: 83 MMHG
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
## 2023-11-22 LAB
B BURGDOR AB SER-IMP: NEGATIVE
B BURGDOR DNA SPEC QL NAA+PROBE: NEGATIVE
B BURGDOR IGG+IGM SER QL: 0.41 INDEX

## 2023-12-06 ENCOUNTER — RESULT REVIEW (OUTPATIENT)
Age: 68
End: 2023-12-06

## 2023-12-06 ENCOUNTER — APPOINTMENT (OUTPATIENT)
Dept: HEMATOLOGY ONCOLOGY | Facility: CLINIC | Age: 68
End: 2023-12-06
Payer: MEDICARE

## 2023-12-06 ENCOUNTER — LABORATORY RESULT (OUTPATIENT)
Age: 68
End: 2023-12-06

## 2023-12-06 VITALS
HEART RATE: 75 BPM | RESPIRATION RATE: 16 BRPM | DIASTOLIC BLOOD PRESSURE: 82 MMHG | OXYGEN SATURATION: 93 % | TEMPERATURE: 97.9 F | WEIGHT: 213 LBS | HEIGHT: 67 IN | BODY MASS INDEX: 33.43 KG/M2 | SYSTOLIC BLOOD PRESSURE: 168 MMHG

## 2023-12-06 DIAGNOSIS — D73.89 OTHER DISEASES OF SPLEEN: ICD-10-CM

## 2023-12-06 DIAGNOSIS — Z85.3 PERSONAL HISTORY OF MALIGNANT NEOPLASM OF BREAST: ICD-10-CM

## 2023-12-06 PROCEDURE — 36415 COLL VENOUS BLD VENIPUNCTURE: CPT

## 2023-12-06 PROCEDURE — 99213 OFFICE O/P EST LOW 20 MIN: CPT | Mod: 25

## 2024-01-03 ENCOUNTER — APPOINTMENT (OUTPATIENT)
Dept: HEMATOLOGY ONCOLOGY | Facility: CLINIC | Age: 69
End: 2024-01-03
Payer: MEDICARE

## 2024-01-03 ENCOUNTER — RESULT REVIEW (OUTPATIENT)
Age: 69
End: 2024-01-03

## 2024-01-03 VITALS
BODY MASS INDEX: 41.62 KG/M2 | RESPIRATION RATE: 16 BRPM | DIASTOLIC BLOOD PRESSURE: 89 MMHG | OXYGEN SATURATION: 95 % | SYSTOLIC BLOOD PRESSURE: 166 MMHG | TEMPERATURE: 98.2 F | HEART RATE: 63 BPM | HEIGHT: 60 IN | WEIGHT: 212 LBS

## 2024-01-03 PROCEDURE — 99214 OFFICE O/P EST MOD 30 MIN: CPT

## 2024-01-03 NOTE — ASSESSMENT
[FreeTextEntry1] : This is a 68-year-old postmenopausal woman presenting with bilateral breast cancer. Routine screening mammogram revealing for new abnormality on the left biopsy revealing for invasive ductal carcinoma ER/AR strongly positive HER-2/merrill negative. Initially there was an enlarged lymph node but this was thought to be negative due to appearance and the lack of visualization on MRI. Then on MRI in November 2021 patient was found to have a contralateral breast mass on the right measuring 4.7 cm biopsy was revealing for invasive lobular carcinoma grade 2 ER/AR positive HER-2/merrill negative. She is now planned for bilateral lumpectomies.  #bilateral breast cancer - the right is in T2N 0 (i) with a 5 cm invasive lobular carcinoma and 2 lymph nodes positive for isolated tumor cells. ER/AR strongly positive at 99% respectively HER-2 negative Ki-67 8%, grade 2 , no LVI -The left side is positive T2N1a for a 2.7 cm invasive ductal carcinoma grade 2 no lymphovascular invasion 2 macrometastases to the lymph node and 1 micrometastases for a grand total of 3 out of 4 this tumor is also ER/AR positive strongly at 95 and 99% respectively but the Ki-67 is higher at 25% -Oncotype DX score was reviewed today at length with the patient and her daughter on the left the score is low at 17 which equates to a 15% risk of distant recurrence in the next 10 years on the right the scores also low risk at 10 with a 3% risk of distant recurrence over the next 10 years. -Because both Oncotype's are low risk this would indicate that based on the molecular biology there would be no significant benefit to giving chemotherapy to reduce the risk of recurrence. However in looking at the clinical risks of both cancers the left with the high Ki-67 and the 3+ lymph nodes clinically the patient is high risk. -Reviewed with patient that both MammaPrint sent on both the right and left cancers results also came back low risk a consistent with a lack of chemotherapy benefit. -Explained the difference between molecular phenotype low risk score and how these both can be incorporated. -Reviewed the final pathology from the bilateral mastectomy which shows residual cancer on the right and on the left there are no additional positive lymph nodes. Therefore the final staging as was determined by the lumpectomy specimens is unchanged. -Again reviewed that in order for the molecular studies to be valid especially the Oncotype have to consider the Rxsponder trial and the enrollment criteria which included 3 or less positive lymph nodes. -Because the additional lymph node dissection did not show any additional positive lymph nodes the patient falls within this criteria. However I did explain that given the 2 primaries and especially given that the right side was over 5 cm she would considered a higher risk side clinically especially given that the left sided breast cancer was lymph node positive. Could consider giving chemotherapy in this situation however given that both Oncotype and MammaPrint would argue that there is no benefit to chemotherapy based on the molecular phenotype this must be taken into consideration. Prior to molecular studies it would have been standard to give chemotherapy however now we know based on the molecular phenotype that chemotherapy may not be beneficial in this setting and that hormone therapy may be more beneficial. In addition now we have the option of giving a CK4 6 inhibitor in the adjuvant setting for high risk patients that I believe this patient would fall into given the high Ki-67 and the large size and multiple positive lymph nodes. -we mutually decided to forgo chemotherapy and proceed with endocrine therapy -We discussed hormonal therapy at length including the mechanism efficacy and side effects. We decided to go with letrozole, discussed the side effects of joint pains hot flashes and bone thinning. -In addition we discussed Verzenio, and the [employer] data. We discussed getting some efficacy and side effects including the risk of cytopenias, GI side effects, fibrosis. - CT and Bone Scan negative for mets - She is s/p completion of radiation - Started Letrozole 5/2022- tolerated fairly well in the beginning. Now with persistent debilitating sxs, joint pain, weight gain, hot flashes. Recommended to hold for 1 week to see if symptoms resolve. -Verzenio started 7/2022. She is s/p ER visit for SOB, rash, congestion, and leg swelling. Records reviewed. Likely r/t to verzenio. 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 S/p 40mg prednisone with taper 08/26/22 and s/p completion of steroids on 9/30/22. Repeat CT chest 10/05/22 shows stable pneumonitis. Will consider lowering dose due to prior toxicity 100mg verzenio B.I.D. - Pt will start 100mg b.i.d verzenio on 11/5/22 - Prior symptoms improved 1 week off of Letrozole. Will switch to Exemestane, started 11/2/22. - s/p L breast US to r/o seroma, revealing no evidence of malignancy, no fluid collection. pt continues to use - left axilla cording- improvement with PT at [place]. - LLE - edema, Oct 2022 doppler negative for DVT. recommend cont compression sleeve and PT. pt also ordered lymphedema pump for LUE. - had follow up with ( Dr. Patricio) on Jan 9, 2023- plan for possible breast revision April 2023- May 2023? - Remain on exemestane and verzenio ( 100mg b.i.d). tolerating well. - continue - Ca 15-3 elevated, but stable. continue to monitor - Sx scheduled for 8/24/23- swap of the expanders and left axilla dissection. Stop Verzenio a week before sx, Eliquis 48h before surgery - Verzenio resumed- hold 12/6/23 due to recent diverticulitis now possible breast inf- seen by breast surgeon this am on Cipro, stop Verzenio   - resumed - decrease the dose and finish the treatment Summer 2024.    #R gonadal/adnexa vein thrombosis - s/p CT CAP 1/30/23 incidental finding - started on Eliquis, now 5mg BID - Had eval with Dr yL April 2023- TV ultrasound and pap smear completed. - reviewed with patient - side effect of Verzenio. - patient to stay on AC while on Verzenio - CT pelvis May 2023.  # Arthritis - increase physical activity  #LUE lymphedema -Fall 2022, s/p RT - Continue compression sleeve - PT at [facility] now completed - ordered compression pump  # osteopenia - DEXA 8/2022  #anxiety - Improving - Xanax as needed - Emotional support given  #splenic lesion - Incidental finding CT c/a/p - few lesions largest measuring 1.5cm. - Repeat CTA 08/2022 show stable in appearance- 1.2cm   Stable since 2020 s/p repeat CT CAP 1/30/23 stable  #insomnia - Improved  #LLE swelling - 11/1/22- LLE duplex- no evidence of DVT - Lymphedema. - CT abd/pelvis scheduled 01/30/23 R gonadal/adnexa v thrombosis - Consider PT - not worse  #hand foot syndrome - Likely r/t to Verzenio - Recommend bag balm - stable  RTC in 8 w

## 2024-01-03 NOTE — HISTORY OF PRESENT ILLNESS
[de-identified] : This is a 67-year-old postmenopausal woman presenting with a new diagnosis of breast cancer\par  Patient goes for regular routine screening mammograms and was initally  found to have architectural distortion at the right breast 12 o'clock position, ultrasound revealing for a 5 mm density she underwent a right breast  biopsy at [facility]  on 3/2019.  which showed atypical ductal hyperplasia and then underwent wide excision in March 2019 showing a radial sclerosing lesion but no further atypia or malignancy. Dr juarez. \par  ? chemo prevention ? \par   Patient follows with Dr. Jessica Hi due to the high risk status and goes for regular mammo and MRI \par  \par  Routine screening mammogram on October 26, 2021 showed postsurgical changes right breast but there was a new ill-defined spiculated mass left breast 7 o'clock position measuring 2 cm there is also an enlarged left axillary lymph node measuring 4.4 x 1.3 cm ( this appeared benign  was not seen on  MRI) \par  \par  patient underwent a left breast ultrasound-guided core biopsy on November 5, 2021  which revealed an intermediate grade invasive  ductal carcinoma ER/DC strongly +95 to 100% HER-2/merrill negative Ki-67 between 40 and 50%.\par  \par  MRI performed on November 29, 2021 revealing for a biopsy-proven malignancy in her left breast up to 2.1 cm with suspicious linear nonmasslike enhancement extending to the anterior inferior index mass total suspicious area of enhancement is 3.3 cm also linear non-mass enhancement left breast at the central portion of the breast MRI guided biopsy was recommended as well suspicious nonletter masslike enhancement in the inner right breast measuring 4.7 cm MRI guided biopsy was recommended.\par  \par  MRI guided biopsy performed on December 9, 2021 revealing for the following left breast non-mass-like enhancement showed usual ductal hyperplasia\par  \par  Right breast non-mass-like enhancement lower inner quadrant revealed grade 2 invasive lobular carcinoma grade 2 out of 3 invasive carcinoma was present in multiple cores up to 12 mm LCIS was also present and focal atypical ductal hyperplasia\par  Tumor was ER +80% DC +40% HER-2/merrill negative Ki-67 25%.\par  \par  Await genetic testing , monday.  9 genes\par  Daughter is negative for BRCA \par  \par  Patient has extensive family history of cancer including a mother who had colon cancer a paternal grandmother who had breast cancer maternal cousin who had ovarian cancer and then breast cancer.  2 paternal cousins who had breast cancer her sister had a phyllodes \par  \par  Status post bilateral partial mastectomies with sentinel lymph node biopsy on January 21, 2022 with Dr. Jessica Hi.\par  \par  Right side-final pathological staging PT2N0(i) \par  -Largest tumor 5 cm classic invasive lobular smaller tumor measuring 4 mm\par  -Grade 2, no lymphovascular invasion\par  -Margin less than 1 mm inferior\par  -1 out of 2 lymph nodes revealing for isolated tumor cells\par  -ER DC positive both at 99% HER-2 negative Ki-67 8%\par  \par  Left side - final path T2N1a\par  Largest tumor 2.7 cm with satellite lesions measuring 1 mm and 2 mm invasive ductal carcinoma\par  -Grade 2 no lymphovascular invasion\par  2 lymph nodes positive for macro metastases measuring 2.4 cm with extranodal extension\par  1 lymph node positive for micro met for a total of 3 out of 4 lymph nodes positive\par  ER/DC +95% and 99%, HER-2 negative Ki-67 25%\par  Margins were also close as well.\par  \par  Oncotype DX score on the left was low risk at 17 equating to a 15% risk of distant recurrence.\par  Oncotype DX score on the right was low risk at 10 with a 3% risk of distant recurrence over the next 10 years.\par  \par  CT of the chest abdomen pelvis show hypoattenuation lesion in the spleen measuring 1.5 cm but no evidence of metastatic disease\par  Bone scan negative for any evidence of metastatic disease.\par  \par  \par  \par  \par  \par  \par  \par   [de-identified] : Patient seen and examined today for follow up. Reports feeling well overall.   She remains on Verzenio (currently on hold x 1 month for diverticulitis flair up)/Examestane-tolerating well Lymphedema to left arm Had Surgery 8/25 for tissue expander removal and breast implants. No lymph node dissection done.

## 2024-01-03 NOTE — REVIEW OF SYSTEMS
[Joint Stiffness] : joint stiffness [Depression] : depression [Hot Flashes] : hot flashes [Easy Bruising] : a tendency for easy bruising [de-identified] :  on Eliquis  [Negative] : Heme/Lymph [Fever] : no fever [Chills] : no chills [Night Sweats] : no night sweats [Fatigue] : no fatigue [Shortness Of Breath] : no shortness of breath [Wheezing] : no wheezing [Cough] : no cough [SOB on Exertion] : no shortness of breath during exertion [Abdominal Pain] : no abdominal pain [Vomiting] : no vomiting [Joint Pain] : no joint pain [Constipation] : no constipation [Muscle Pain] : no muscle pain [Muscle Weakness] : no muscle weakness [Difficulty Walking] : no difficulty walking [Insomnia] : no insomnia [Anxiety] : no anxiety [FreeTextEntry2] : sleep is better since starting magnesium [de-identified] : +L arm lymphedema improved with compression stocking but L hand swelling, wears glove

## 2024-01-03 NOTE — PHYSICAL EXAM
[Fully active, able to carry on all pre-disease performance without restriction] : Status 0 - Fully active, able to carry on all pre-disease performance without restriction [Normal] : affect appropriate [de-identified] : b/l breast s/p partial mastectomy and expanders in place. no palpable masses nor lesions. left axilla with cording.   [de-identified] : moderate distention . normoactive bowel sounds. no palpable tenderness . no guarding upon percussion [de-identified] : LUE and LLE lymphedema. wears compression sleeve to LUE

## 2024-02-26 ENCOUNTER — TRANSCRIPTION ENCOUNTER (OUTPATIENT)
Age: 69
End: 2024-02-26

## 2024-02-29 ENCOUNTER — APPOINTMENT (OUTPATIENT)
Dept: HEMATOLOGY ONCOLOGY | Facility: CLINIC | Age: 69
End: 2024-02-29

## 2024-02-29 ENCOUNTER — RESULT REVIEW (OUTPATIENT)
Age: 69
End: 2024-02-29

## 2024-02-29 VITALS
DIASTOLIC BLOOD PRESSURE: 98 MMHG | TEMPERATURE: 97.8 F | WEIGHT: 212 LBS | BODY MASS INDEX: 37.56 KG/M2 | SYSTOLIC BLOOD PRESSURE: 181 MMHG | OXYGEN SATURATION: 96 % | HEART RATE: 65 BPM | HEIGHT: 63 IN | RESPIRATION RATE: 16 BRPM

## 2024-03-27 ENCOUNTER — LABORATORY RESULT (OUTPATIENT)
Age: 69
End: 2024-03-27

## 2024-03-27 ENCOUNTER — APPOINTMENT (OUTPATIENT)
Dept: HEMATOLOGY ONCOLOGY | Facility: CLINIC | Age: 69
End: 2024-03-27
Payer: MEDICARE

## 2024-03-27 ENCOUNTER — RESULT REVIEW (OUTPATIENT)
Age: 69
End: 2024-03-27

## 2024-03-27 VITALS
BODY MASS INDEX: 38.09 KG/M2 | SYSTOLIC BLOOD PRESSURE: 155 MMHG | RESPIRATION RATE: 16 BRPM | HEIGHT: 63 IN | DIASTOLIC BLOOD PRESSURE: 82 MMHG | TEMPERATURE: 98.2 F | WEIGHT: 215 LBS | HEART RATE: 72 BPM | OXYGEN SATURATION: 97 %

## 2024-03-27 DIAGNOSIS — N60.91 UNSPECIFIED BENIGN MAMMARY DYSPLASIA OF RIGHT BREAST: ICD-10-CM

## 2024-03-27 DIAGNOSIS — I82.890 ACUTE EMBOLISM AND THROMBOSIS OF OTHER SPECIFIED VEINS: ICD-10-CM

## 2024-03-27 PROCEDURE — 99214 OFFICE O/P EST MOD 30 MIN: CPT

## 2024-03-27 RX ORDER — ABEMACICLIB 50 MG/1
50 TABLET ORAL
Qty: 60 | Refills: 6 | Status: ACTIVE | COMMUNITY
Start: 2022-10-25 | End: 1900-01-01

## 2024-04-01 RX ORDER — EXEMESTANE 25 MG/1
25 TABLET, FILM COATED ORAL
Qty: 90 | Refills: 3 | Status: ACTIVE | COMMUNITY
Start: 2022-11-02 | End: 1900-01-01

## 2024-04-04 ENCOUNTER — APPOINTMENT (OUTPATIENT)
Dept: HEMATOLOGY ONCOLOGY | Facility: CLINIC | Age: 69
End: 2024-04-04

## 2024-04-08 DIAGNOSIS — R74.8 ABNORMAL LEVELS OF OTHER SERUM ENZYMES: ICD-10-CM

## 2024-04-11 ENCOUNTER — APPOINTMENT (OUTPATIENT)
Dept: HEMATOLOGY ONCOLOGY | Facility: CLINIC | Age: 69
End: 2024-04-11

## 2024-04-11 ENCOUNTER — RESULT REVIEW (OUTPATIENT)
Age: 69
End: 2024-04-11

## 2024-04-11 VITALS
DIASTOLIC BLOOD PRESSURE: 88 MMHG | BODY MASS INDEX: 38.09 KG/M2 | TEMPERATURE: 97.2 F | OXYGEN SATURATION: 96 % | WEIGHT: 215 LBS | HEIGHT: 63 IN | SYSTOLIC BLOOD PRESSURE: 155 MMHG | HEART RATE: 85 BPM | RESPIRATION RATE: 16 BRPM

## 2024-05-09 ENCOUNTER — APPOINTMENT (OUTPATIENT)
Dept: HEMATOLOGY ONCOLOGY | Facility: CLINIC | Age: 69
End: 2024-05-09

## 2024-05-09 ENCOUNTER — RESULT REVIEW (OUTPATIENT)
Age: 69
End: 2024-05-09

## 2024-05-09 VITALS
BODY MASS INDEX: 38.09 KG/M2 | TEMPERATURE: 98.2 F | HEART RATE: 74 BPM | WEIGHT: 215 LBS | OXYGEN SATURATION: 94 % | SYSTOLIC BLOOD PRESSURE: 121 MMHG | RESPIRATION RATE: 16 BRPM | HEIGHT: 63 IN | DIASTOLIC BLOOD PRESSURE: 74 MMHG

## 2024-05-22 ENCOUNTER — RESULT REVIEW (OUTPATIENT)
Age: 69
End: 2024-05-22

## 2024-05-22 PROBLEM — I82.890 THROMBOSIS OF OVARIAN VEIN: Status: ACTIVE | Noted: 2023-02-22

## 2024-05-22 PROBLEM — N60.91 ATYPICAL HYPERPLASIA OF RIGHT BREAST: Status: ACTIVE | Noted: 2021-11-18

## 2024-05-22 NOTE — ASSESSMENT
[FreeTextEntry1] : This is a 68-year-old postmenopausal woman presenting with bilateral breast cancer. Routine screening mammogram revealing for new abnormality on the left biopsy revealing for invasive ductal carcinoma ER/SC strongly positive HER-2/merrill negative. Initially there was an enlarged lymph node but this was thought to be negative due to appearance and the lack of visualization on MRI. Then on MRI in November 2021 patient was found to have a contralateral breast mass on the right measuring 4.7 cm biopsy was revealing for invasive lobular carcinoma grade 2 ER/SC positive HER-2/merrill negative. She is now planned for bilateral lumpectomies.  #bilateral breast cancer - the right is in T2N 0 (i) with a 5 cm invasive lobular carcinoma and 2 lymph nodes positive for isolated tumor cells. ER/SC strongly positive at 99% respectively HER-2 negative Ki-67 8%, grade 2 , no LVI -The left side is positive T2N1a for a 2.7 cm invasive ductal carcinoma grade 2 no lymphovascular invasion 2 macrometastases to the lymph node and 1 micrometastases for a grand total of 3 out of 4 this tumor is also ER/SC positive strongly at 95 and 99% respectively but the Ki-67 is higher at 25% -Oncotype DX score was reviewed today at length with the patient and her daughter on the left the score is low at 17 which equates to a 15% risk of distant recurrence in the next 10 years on the right the scores also low risk at 10 with a 3% risk of distant recurrence over the next 10 years. -Because both Oncotype's are low risk this would indicate that based on the molecular biology there would be no significant benefit to giving chemotherapy to reduce the risk of recurrence. However in looking at the clinical risks of both cancers the left with the high Ki-67 and the 3+ lymph nodes clinically the patient is high risk. -Reviewed with patient that both MammaPrint sent on both the right and left cancers results also came back low risk a consistent with a lack of chemotherapy benefit. -Explained the difference between molecular phenotype low risk score and how these both can be incorporated. -Reviewed the final pathology from the bilateral mastectomy which shows residual cancer on the right and on the left there are no additional positive lymph nodes. Therefore the final staging as was determined by the lumpectomy specimens is unchanged. -Again reviewed that in order for the molecular studies to be valid especially the Oncotype have to consider the Rxsponder trial and the enrollment criteria which included 3 or less positive lymph nodes. -Because the additional lymph node dissection did not show any additional positive lymph nodes the patient falls within this criteria. However I did explain that given the 2 primaries and especially given that the right side was over 5 cm she would considered a higher risk side clinically especially given that the left sided breast cancer was lymph node positive. Could consider giving chemotherapy in this situation however given that both Oncotype and MammaPrint would argue that there is no benefit to chemotherapy based on the molecular phenotype this must be taken into consideration. Prior to molecular studies it would have been standard to give chemotherapy however now we know based on the molecular phenotype that chemotherapy may not be beneficial in this setting and that hormone therapy may be more beneficial. In addition now we have the option of giving a CK4 6 inhibitor in the adjuvant setting for high risk patients that I believe this patient would fall into given the high Ki-67 and the large size and multiple positive lymph nodes. -we mutually decided to forgo chemotherapy and proceed with endocrine therapy -We discussed hormonal therapy at length including the mechanism efficacy and side effects. We decided to go with letrozole, discussed the side effects of joint pains hot flashes and bone thinning. -In addition we discussed Verzenio, and the [employer] data. We discussed getting some efficacy and side effects including the risk of cytopenias, GI side effects, fibrosis. - CT and Bone Scan negative for mets - She is s/p completion of radiation - Started Letrozole 5/2022- tolerated fairly well in the beginning. Now with persistent debilitating sxs, joint pain, weight gain, hot flashes. Recommended to hold for 1 week to see if symptoms resolve. -Verzenio started 7/2022. She is s/p ER visit for SOB, rash, congestion, and leg swelling. Records reviewed. Likely r/t to verzenio. 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 S/p 40mg prednisone with taper 08/26/22 and s/p completion of steroids on 9/30/22. Repeat CT chest 10/05/22 shows stable pneumonitis. Will consider lowering dose due to prior toxicity 100mg verzenio B.I.D. - Pt will start 100mg b.i.d verzenio on 11/5/22 - Prior symptoms improved 1 week off of Letrozole. Will switch to Exemestane, started 11/2/22. - s/p L breast US to r/o seroma, revealing no evidence of malignancy, no fluid collection. pt continues to use - left axilla cording- improvement with PT at [place]. - LLE - edema, Oct 2022 doppler negative for DVT. recommend cont compression sleeve and PT. pt also ordered lymphedema pump for LUE. - had follow up with ( Dr. Patricio) on Jan 9, 2023- plan for possible breast revision April 2023- May 2023? - Remain on exemestane and verzenio ( 100mg b.i.d). tolerating well. - continue - Ca 15-3 elevated, but stable. continue to monitor - Sx scheduled for 8/24/23- swap of the expanders and left axilla dissection. Stop Verzenio a week before sx, Eliquis 48h before surgery - Verzenio resumed- hold 12/6/23 due to recent diverticulitis now possible breast inf- seen by breast surgeon this am on Cipro, stop Verzenio   - resumed - decrease the dose and finish the treatment Summer 2024.   - tx interrupted b/o cellulitis   #R gonadal/adnexa vein thrombosis - s/p CT CAP 1/30/23 incidental finding - started on Eliquis, now 5mg BID - Had eval with Dr Ly April 2023- TV ultrasound and pap smear completed. - reviewed with patient - side effect of Verzenio. - patient to stay on AC while on Verzenio - CT pelvis May 2023.  # Arthritis - increase physical activity  #LUE lymphedema -Fall 2022, s/p RT - Continue compression sleeve - PT at [place] now completed - ordered compression pump  # osteopenia - DEXA 8/2022  #anxiety - Improving - Xanax as needed - Emotional support given  #splenic lesion - Incidental finding CT c/a/p - few lesions largest measuring 1.5cm. - Repeat CTA 08/2022 show stable in appearance- 1.2cm   Stable since 2020 s/p repeat CT CAP 1/30/23 stable  #insomnia - Improved  #LLE swelling - 11/1/22- LLE duplex- no evidence of DVT - Lymphedema. - CT abd/pelvis scheduled 01/30/23 R gonadal/adnexa v thrombosis - Consider PT - not worse  #hand foot syndrome - Likely r/t to Verzenio - Recommend bag balm - stable  RTC in 12 w

## 2024-05-22 NOTE — HISTORY OF PRESENT ILLNESS
[de-identified] : This is a 67-year-old postmenopausal woman presenting with a new diagnosis of breast cancer\par  Patient goes for regular routine screening mammograms and was initally  found to have architectural distortion at the right breast 12 o'clock position, ultrasound revealing for a 5 mm density she underwent a right breast  biopsy at [facility]  on 3/2019.  which showed atypical ductal hyperplasia and then underwent wide excision in March 2019 showing a radial sclerosing lesion but no further atypia or malignancy. Dr juarez. \par  ? chemo prevention ? \par   Patient follows with Dr. Jessica Hi due to the high risk status and goes for regular mammo and MRI \par  \par  Routine screening mammogram on October 26, 2021 showed postsurgical changes right breast but there was a new ill-defined spiculated mass left breast 7 o'clock position measuring 2 cm there is also an enlarged left axillary lymph node measuring 4.4 x 1.3 cm ( this appeared benign  was not seen on  MRI) \par  \par  patient underwent a left breast ultrasound-guided core biopsy on November 5, 2021  which revealed an intermediate grade invasive  ductal carcinoma ER/MA strongly +95 to 100% HER-2/merrill negative Ki-67 between 40 and 50%.\par  \par  MRI performed on November 29, 2021 revealing for a biopsy-proven malignancy in her left breast up to 2.1 cm with suspicious linear nonmasslike enhancement extending to the anterior inferior index mass total suspicious area of enhancement is 3.3 cm also linear non-mass enhancement left breast at the central portion of the breast MRI guided biopsy was recommended as well suspicious nonletter masslike enhancement in the inner right breast measuring 4.7 cm MRI guided biopsy was recommended.\par  \par  MRI guided biopsy performed on December 9, 2021 revealing for the following left breast non-mass-like enhancement showed usual ductal hyperplasia\par  \par  Right breast non-mass-like enhancement lower inner quadrant revealed grade 2 invasive lobular carcinoma grade 2 out of 3 invasive carcinoma was present in multiple cores up to 12 mm LCIS was also present and focal atypical ductal hyperplasia\par  Tumor was ER +80% MA +40% HER-2/merrill negative Ki-67 25%.\par  \par  Await genetic testing , monday.  9 genes\par  Daughter is negative for BRCA \par  \par  Patient has extensive family history of cancer including a mother who had colon cancer a paternal grandmother who had breast cancer maternal cousin who had ovarian cancer and then breast cancer.  2 paternal cousins who had breast cancer her sister had a phyllodes \par  \par  Status post bilateral partial mastectomies with sentinel lymph node biopsy on January 21, 2022 with Dr. Jessica Hi.\par  \par  Right side-final pathological staging PT2N0(i) \par  -Largest tumor 5 cm classic invasive lobular smaller tumor measuring 4 mm\par  -Grade 2, no lymphovascular invasion\par  -Margin less than 1 mm inferior\par  -1 out of 2 lymph nodes revealing for isolated tumor cells\par  -ER MA positive both at 99% HER-2 negative Ki-67 8%\par  \par  Left side - final path T2N1a\par  Largest tumor 2.7 cm with satellite lesions measuring 1 mm and 2 mm invasive ductal carcinoma\par  -Grade 2 no lymphovascular invasion\par  2 lymph nodes positive for macro metastases measuring 2.4 cm with extranodal extension\par  1 lymph node positive for micro met for a total of 3 out of 4 lymph nodes positive\par  ER/MA +95% and 99%, HER-2 negative Ki-67 25%\par  Margins were also close as well.\par  \par  Oncotype DX score on the left was low risk at 17 equating to a 15% risk of distant recurrence.\par  Oncotype DX score on the right was low risk at 10 with a 3% risk of distant recurrence over the next 10 years.\par  \par  CT of the chest abdomen pelvis show hypoattenuation lesion in the spleen measuring 1.5 cm but no evidence of metastatic disease\par  Bone scan negative for any evidence of metastatic disease.\par  \par  \par  \par  \par  \par  \par  \par   [de-identified] : Patient seen and examined today for follow up. Reports feeling well overall.   ADmitted 2/21/24-3/25/2024 for cellulitis to left arm. Completed IV and oral antibitoics She remains on Verzenio (currently on hold due to cellulitis)/Examestane-tolerating well Lymphedema to left arm remains, using pump at home

## 2024-05-22 NOTE — REVIEW OF SYSTEMS
[Joint Stiffness] : joint stiffness [Depression] : depression [Hot Flashes] : hot flashes [Negative] : Heme/Lymph [Fatigue] : fatigue [Fever] : no fever [Chills] : no chills [Night Sweats] : no night sweats [Shortness Of Breath] : no shortness of breath [Wheezing] : no wheezing [Cough] : no cough [SOB on Exertion] : no shortness of breath during exertion [Abdominal Pain] : no abdominal pain [Vomiting] : no vomiting [Constipation] : no constipation [Joint Pain] : no joint pain [Muscle Pain] : no muscle pain [Muscle Weakness] : no muscle weakness [Difficulty Walking] : no difficulty walking [Insomnia] : no insomnia [Anxiety] : no anxiety [FreeTextEntry2] : sleep is better since starting magnesium [de-identified] : +L arm lymphedema improved with compression stocking

## 2024-05-22 NOTE — PHYSICAL EXAM
[Fully active, able to carry on all pre-disease performance without restriction] : Status 0 - Fully active, able to carry on all pre-disease performance without restriction [Normal] : grossly intact [de-identified] : b/l breast s/p partial mastectomy and expanders in place. no palpable masses nor lesions. left axilla with cording.   [de-identified] : moderate distention . normoactive bowel sounds. no palpable tenderness . no guarding upon percussion [de-identified] : LUE and LLE lymphedema. wears compression sleeve to LUE

## 2024-06-26 ENCOUNTER — LABORATORY RESULT (OUTPATIENT)
Age: 69
End: 2024-06-26

## 2024-06-26 ENCOUNTER — RESULT REVIEW (OUTPATIENT)
Age: 69
End: 2024-06-26

## 2024-06-26 ENCOUNTER — APPOINTMENT (OUTPATIENT)
Dept: HEMATOLOGY ONCOLOGY | Facility: CLINIC | Age: 69
End: 2024-06-26
Payer: MEDICARE

## 2024-06-26 VITALS
WEIGHT: 219 LBS | HEART RATE: 76 BPM | OXYGEN SATURATION: 96 % | TEMPERATURE: 98.4 F | HEIGHT: 63 IN | DIASTOLIC BLOOD PRESSURE: 82 MMHG | BODY MASS INDEX: 38.8 KG/M2 | SYSTOLIC BLOOD PRESSURE: 157 MMHG | RESPIRATION RATE: 16 BRPM

## 2024-06-26 DIAGNOSIS — R53.83 OTHER FATIGUE: ICD-10-CM

## 2024-06-26 DIAGNOSIS — C50.311 MALIGNANT NEOPLASM OF LOWER-INNER QUADRANT OF RIGHT FEMALE BREAST: ICD-10-CM

## 2024-06-26 DIAGNOSIS — Z17.0 MALIGNANT NEOPLASM OF LOWER-INNER QUADRANT OF LEFT FEMALE BREAST: ICD-10-CM

## 2024-06-26 DIAGNOSIS — I82.409 ACUTE EMBOLISM AND THROMBOSIS OF UNSPECIFIED DEEP VEINS OF UNSPECIFIED LOWER EXTREMITY: ICD-10-CM

## 2024-06-26 DIAGNOSIS — C50.312 MALIGNANT NEOPLASM OF LOWER-INNER QUADRANT OF LEFT FEMALE BREAST: ICD-10-CM

## 2024-06-26 DIAGNOSIS — Z17.0 MALIGNANT NEOPLASM OF LOWER-INNER QUADRANT OF RIGHT FEMALE BREAST: ICD-10-CM

## 2024-06-26 DIAGNOSIS — M85.80 OTHER SPECIFIED DISORDERS OF BONE DENSITY AND STRUCTURE, UNSPECIFIED SITE: ICD-10-CM

## 2024-06-26 PROCEDURE — 99214 OFFICE O/P EST MOD 30 MIN: CPT

## 2024-06-26 RX ORDER — PANTOPRAZOLE 40 MG/1
40 TABLET, DELAYED RELEASE ORAL
Refills: 0 | Status: ACTIVE | COMMUNITY

## 2024-06-26 RX ORDER — APIXABAN 2.5 MG/1
2.5 TABLET, FILM COATED ORAL
Qty: 180 | Refills: 1 | Status: ACTIVE | COMMUNITY
Start: 2023-01-31 | End: 1900-01-01

## 2024-06-26 RX ORDER — AMOXICILLIN 500 MG/1
500 TABLET, FILM COATED ORAL
Qty: 4 | Refills: 1 | Status: ACTIVE | COMMUNITY
Start: 2024-06-26 | End: 1900-01-01

## 2024-07-01 ENCOUNTER — NON-APPOINTMENT (OUTPATIENT)
Age: 69
End: 2024-07-01

## 2024-07-01 DIAGNOSIS — L03.114 CELLULITIS OF LEFT UPPER LIMB: ICD-10-CM

## 2024-07-01 RX ORDER — AMOXICILLIN AND CLAVULANATE POTASSIUM 875; 125 MG/1; MG/1
875-125 TABLET, COATED ORAL
Qty: 20 | Refills: 1 | Status: ACTIVE | COMMUNITY
Start: 2024-07-01 | End: 1900-01-01

## 2024-08-01 ENCOUNTER — RESULT REVIEW (OUTPATIENT)
Age: 69
End: 2024-08-01

## 2024-09-25 ENCOUNTER — RESULT REVIEW (OUTPATIENT)
Age: 69
End: 2024-09-25

## 2024-09-25 ENCOUNTER — LABORATORY RESULT (OUTPATIENT)
Age: 69
End: 2024-09-25

## 2024-09-25 ENCOUNTER — APPOINTMENT (OUTPATIENT)
Dept: HEMATOLOGY ONCOLOGY | Facility: CLINIC | Age: 69
End: 2024-09-25
Payer: MEDICARE

## 2024-09-25 VITALS
SYSTOLIC BLOOD PRESSURE: 143 MMHG | RESPIRATION RATE: 16 BRPM | OXYGEN SATURATION: 95 % | WEIGHT: 223.1 LBS | HEART RATE: 73 BPM | HEIGHT: 63 IN | DIASTOLIC BLOOD PRESSURE: 83 MMHG | BODY MASS INDEX: 39.53 KG/M2 | TEMPERATURE: 97.1 F

## 2024-09-25 DIAGNOSIS — I82.890 ACUTE EMBOLISM AND THROMBOSIS OF OTHER SPECIFIED VEINS: ICD-10-CM

## 2024-09-25 DIAGNOSIS — C50.312 MALIGNANT NEOPLASM OF LOWER-INNER QUADRANT OF LEFT FEMALE BREAST: ICD-10-CM

## 2024-09-25 DIAGNOSIS — I82.409 ACUTE EMBOLISM AND THROMBOSIS OF UNSPECIFIED DEEP VEINS OF UNSPECIFIED LOWER EXTREMITY: ICD-10-CM

## 2024-09-25 DIAGNOSIS — R53.83 OTHER FATIGUE: ICD-10-CM

## 2024-09-25 DIAGNOSIS — M85.80 OTHER SPECIFIED DISORDERS OF BONE DENSITY AND STRUCTURE, UNSPECIFIED SITE: ICD-10-CM

## 2024-09-25 DIAGNOSIS — Z17.0 MALIGNANT NEOPLASM OF LOWER-INNER QUADRANT OF LEFT FEMALE BREAST: ICD-10-CM

## 2024-09-25 PROCEDURE — 99214 OFFICE O/P EST MOD 30 MIN: CPT

## 2024-09-25 NOTE — ASSESSMENT
[FreeTextEntry1] : This is a 69-year-old postmenopausal woman presenting with bilateral breast cancer. Routine screening mammogram revealing for new abnormality on the left biopsy revealing for invasive ductal carcinoma ER/[place] strongly positive HER-2/merrill negative. Initially there was an enlarged lymph node but this was thought to be negative due to appearance and the lack of visualization on MRI. Then on MRI in November 2021 patient was found to have a contralateral breast mass on the right measuring 4.7 cm biopsy was revealing for invasive lobular carcinoma grade 2 ER/[place] positive HER-2/merrill negative. She is now planned for bilateral lumpectomies.  #bilateral breast cancer - the right is in T2N 0 (i) with a 5 cm invasive lobular carcinoma and 2 lymph nodes positive for isolated tumor cells. ER/[place] strongly positive at 99% respectively HER-2 negative Ki-67 8%, grade 2 , no LVI -The left side is positive T2N1a for a 2.7 cm invasive ductal carcinoma grade 2 no lymphovascular invasion 2 macrometastases to the lymph node and 1 micrometastases for a grand total of 3 out of 4 this tumor is also ER/[place] positive strongly at 95 and 99% respectively but the Ki-67 is higher at 25% -Oncotype DX score was reviewed today at length with the patient and her daughter on the left the score is low at 17 which equates to a 15% risk of distant recurrence in the next 10 years on the right the scores also low risk at 10 with a 3% risk of distant recurrence over the next 10 years. -Because both Oncotype's are low risk this would indicate that based on the molecular biology there would be no significant benefit to giving chemotherapy to reduce the risk of recurrence. However in looking at the clinical risks of both cancers the left with the high Ki-67 and the 3+ lymph nodes clinically the patient is high risk. -Reviewed with patient that both MammaPrint sent on both the right and left cancers results also came back low risk a consistent with a lack of chemotherapy benefit. -Explained the difference between molecular phenotype low risk score and how these both can be incorporated. -Reviewed the final pathology from the bilateral mastectomy which shows residual cancer on the right and on the left there are no additional positive lymph nodes. Therefore the final staging as was determined by the lumpectomy specimens is unchanged. -Again reviewed that in order for the molecular studies to be valid especially the Oncotype have to consider the Rxsponder trial and the enrollment criteria which included 3 or less positive lymph nodes. -Because the additional lymph node dissection did not show any additional positive lymph nodes the patient falls within this criteria. However I did explain that given the 2 primaries and especially given that the right side was over 5 cm she would considered a higher risk side clinically especially given that the left sided breast cancer was lymph node positive. Could consider giving chemotherapy in this situation however given that both Oncotype and MammaPrint would argue that there is no benefit to chemotherapy based on the molecular phenotype this must be taken into consideration. Prior to molecular studies it would have been standard to give chemotherapy however now we know based on the molecular phenotype that chemotherapy may not be beneficial in this setting and that hormone therapy may be more beneficial. In addition now we have the option of giving a CK4 6 inhibitor in the adjuvant setting for high risk patients that I believe this patient would fall into given the high Ki-67 and the large size and multiple positive lymph nodes. -we mutually decided to forgo chemotherapy and proceed with endocrine therapy -We discussed hormonal therapy at length including the mechanism efficacy and side effects. We decided to go with letrozole, discussed the side effects of joint pains hot flashes and bone thinning. -In addition, we discussed Verzenio, and the [employer] data. We discussed getting some efficacy and side effects including the risk of cytopenias, GI side effects, fibrosis. - CT and Bone Scan negative for mets - She is s/p completion of radiation - Started Letrozole 5/2022- tolerated fairly well in the beginning. Now with persistent debilitating sxs, joint pain, weight gain, hot flashes. Recommended to hold for 1 week to see if symptoms resolve. -Verzenio started 7/2022. She is s/p ER visit for SOB, rash, congestion, and leg swelling. Records reviewed. Likely r/t to verzenio. 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 S/p 40mg prednisone with taper 08/26/22 and s/p completion of steroids on 9/30/22. Repeat CT chest 10/05/22 shows stable pneumonitis. Will consider lowering dose due to prior toxicity 100mg verzenio B.I.D. - Pt will start 100mg b.i.d verzenio on 11/5/22 - Prior symptoms improved 1 week off of Letrozole. Will switch to Exemestane, started 11/2/22. - s/p L breast US to r/o seroma, revealing no evidence of malignancy, no fluid collection. pt continues to use - left axilla cording- improvement with PT at [facility]. - LLE - edema, Oct 2022 doppler negative for DVT. recommend cont compression sleeve and PT. pt also ordered lymphedema pump for LUE. - had follow up with ( Dr. Patricio) on Jan 9, 2023- plan for possible breast revision April 2023- May 2023? - Remain on exemestane and verzenio ( 100mg b.i.d). tolerating well. - continue - Ca 15-3 elevated, but stable. continue to monitor - Sx scheduled for 8/24/23- swap of the expanders and left axilla dissection. Stop Verzenio a week before sx, Eliquis 48h before surgery - Verzenio resumed- hold 12/6/23 due to recent diverticulitis now possible breast inf- seen by breast surgeon this am on Cipro, stop Verzenio   - resumed - decrease the dose and finish the treatment July 2024.   - continue letrozole   # Chest wall pain - plan to remove implant, hold Apixaban x 48h before procedure  #R gonadal/adnexa vein thrombosis - s/p CT CAP 1/30/23 incidental finding - started on Eliquis, now 5mg BID - Had eval with Dr Ly April 2023- TV ultrasound and pap smear completed. - reviewed with patient - side effect of Verzenio. - patient to stay on AC while on Verzenio - CT pelvis May 2023. - repeat CT scan - if gonadal vein thrombosis dissipated, after verzenio stopped lower eliquis to 2.5 mg PO BID  # Arthritis - increase physical activity  #LUE lymphedema -Fall 2022, s/p RT - Continue compression sleeve - PT at [facility] now completed - ordered compression pump  # osteopenia - DEXA 8/2022  #anxiety - Improving - Xanax as needed - Emotional support given  #splenic lesion - Incidental finding CT c/a/p - few lesions largest measuring 1.5cm. - Repeat CTA 08/2022 show stable in appearance- 1.2cm   Stable since 2020 s/p repeat CT CAP 1/30/23 stable  #insomnia - Improved  #LLE swelling - 11/1/22- LLE duplex- no evidence of DVT - Lymphedema. - CT abd/pelvis scheduled 01/30/23 R gonadal/adnexa v thrombosis - Consider PT  # Patient pinpoints pain in the scalp area, also feels pulling on the face - suspected neuralgia - neuro evaluation  RTC in 12 w

## 2024-09-25 NOTE — REVIEW OF SYSTEMS
[Fatigue] : fatigue [Joint Stiffness] : joint stiffness [Insomnia] : insomnia [Depression] : depression [Hot Flashes] : hot flashes [Negative] : Allergic/Immunologic [Chills] : chills [Fever] : no fever [Night Sweats] : no night sweats [Shortness Of Breath] : no shortness of breath [Wheezing] : no wheezing [Cough] : no cough [SOB on Exertion] : no shortness of breath during exertion [Abdominal Pain] : no abdominal pain [Vomiting] : no vomiting [Constipation] : no constipation [Joint Pain] : no joint pain [Muscle Pain] : no muscle pain [Muscle Weakness] : no muscle weakness [Difficulty Walking] : no difficulty walking [Anxiety] : no anxiety [de-identified] : +L arm lymphedema improved with compression stocking  [de-identified] : occasional

## 2024-09-25 NOTE — PHYSICAL EXAM
[Fully active, able to carry on all pre-disease performance without restriction] : Status 0 - Fully active, able to carry on all pre-disease performance without restriction [Normal] : affect appropriate [de-identified] : b/l breast s/p partial mastectomy and expanders in place. no palpable masses nor lesions. left axilla with cording.   [de-identified] : moderate distention . normoactive bowel sounds. no palpable tenderness . no guarding upon percussion [de-identified] : LUE and LLE lymphedema. wears compression sleeve to LUE

## 2024-09-25 NOTE — BEGINNING OF VISIT
[0] : 1) Little interest or pleasure doing things: Not at all (0) [2] : 2) Feeling down, depressed, or hopeless for more than half of the days (2) [Pain Scale: ___] : On a scale of 1-10, today the patient's pain is a(n) [unfilled]. [Former] : Former [20 or more] : 20 or more [> 15 Years] : > 15 Years [Date Discussed (MM/DD/YY): ___] : Discussed: [unfilled] [With Patient/Caregiver] : with Patient/Caregiver [[id number]] : 2 [Future Reassessment of Pain Scale] : Future reassessment of pain scale [Medication(s)] : Medication(s)

## 2024-09-25 NOTE — REVIEW OF SYSTEMS
[Fatigue] : fatigue [Joint Stiffness] : joint stiffness [Insomnia] : insomnia [Depression] : depression [Hot Flashes] : hot flashes [Negative] : Allergic/Immunologic [Chills] : chills [Fever] : no fever [Night Sweats] : no night sweats [Shortness Of Breath] : no shortness of breath [Wheezing] : no wheezing [Cough] : no cough [SOB on Exertion] : no shortness of breath during exertion [Abdominal Pain] : no abdominal pain [Vomiting] : no vomiting [Constipation] : no constipation [Joint Pain] : no joint pain [Muscle Pain] : no muscle pain [Muscle Weakness] : no muscle weakness [Difficulty Walking] : no difficulty walking [Anxiety] : no anxiety [de-identified] : +L arm lymphedema improved with compression stocking  [de-identified] : occasional

## 2024-09-25 NOTE — HISTORY OF PRESENT ILLNESS
[de-identified] : This is a 67-year-old postmenopausal woman presenting with a new diagnosis of breast cancer\par  Patient goes for regular routine screening mammograms and was initally  found to have architectural distortion at the right breast 12 o'clock position, ultrasound revealing for a 5 mm density she underwent a right breast  biopsy at [facility]  on 3/2019.  which showed atypical ductal hyperplasia and then underwent wide excision in March 2019 showing a radial sclerosing lesion but no further atypia or malignancy. Dr juarez. \par  ? chemo prevention ? \par   Patient follows with Dr. Jessica Hi due to the high risk status and goes for regular mammo and MRI \par  \par  Routine screening mammogram on October 26, 2021 showed postsurgical changes right breast but there was a new ill-defined spiculated mass left breast 7 o'clock position measuring 2 cm there is also an enlarged left axillary lymph node measuring 4.4 x 1.3 cm ( this appeared benign  was not seen on  MRI) \par  \par  patient underwent a left breast ultrasound-guided core biopsy on November 5, 2021  which revealed an intermediate grade invasive  ductal carcinoma ER/OK strongly +95 to 100% HER-2/merrill negative Ki-67 between 40 and 50%.\par  \par  MRI performed on November 29, 2021 revealing for a biopsy-proven malignancy in her left breast up to 2.1 cm with suspicious linear nonmasslike enhancement extending to the anterior inferior index mass total suspicious area of enhancement is 3.3 cm also linear non-mass enhancement left breast at the central portion of the breast MRI guided biopsy was recommended as well suspicious nonletter masslike enhancement in the inner right breast measuring 4.7 cm MRI guided biopsy was recommended.\par  \par  MRI guided biopsy performed on December 9, 2021 revealing for the following left breast non-mass-like enhancement showed usual ductal hyperplasia\par  \par  Right breast non-mass-like enhancement lower inner quadrant revealed grade 2 invasive lobular carcinoma grade 2 out of 3 invasive carcinoma was present in multiple cores up to 12 mm LCIS was also present and focal atypical ductal hyperplasia\par  Tumor was ER +80% OK +40% HER-2/merrill negative Ki-67 25%.\par  \par  Await genetic testing , monday.  9 genes\par  Daughter is negative for BRCA \par  \par  Patient has extensive family history of cancer including a mother who had colon cancer a paternal grandmother who had breast cancer maternal cousin who had ovarian cancer and then breast cancer.  2 paternal cousins who had breast cancer her sister had a phyllodes \par  \par  Status post bilateral partial mastectomies with sentinel lymph node biopsy on January 21, 2022 with Dr. Jessica Hi.\par  \par  Right side-final pathological staging PT2N0(i) \par  -Largest tumor 5 cm classic invasive lobular smaller tumor measuring 4 mm\par  -Grade 2, no lymphovascular invasion\par  -Margin less than 1 mm inferior\par  -1 out of 2 lymph nodes revealing for isolated tumor cells\par  -ER OK positive both at 99% HER-2 negative Ki-67 8%\par  \par  Left side - final path T2N1a\par  Largest tumor 2.7 cm with satellite lesions measuring 1 mm and 2 mm invasive ductal carcinoma\par  -Grade 2 no lymphovascular invasion\par  2 lymph nodes positive for macro metastases measuring 2.4 cm with extranodal extension\par  1 lymph node positive for micro met for a total of 3 out of 4 lymph nodes positive\par  ER/OK +95% and 99%, HER-2 negative Ki-67 25%\par  Margins were also close as well.\par  \par  Oncotype DX score on the left was low risk at 17 equating to a 15% risk of distant recurrence.\par  Oncotype DX score on the right was low risk at 10 with a 3% risk of distant recurrence over the next 10 years.\par  \par  CT of the chest abdomen pelvis show hypoattenuation lesion in the spleen measuring 1.5 cm but no evidence of metastatic disease\par  Bone scan negative for any evidence of metastatic disease.\par  \par  \par  \par  \par  \par  \par  \par   [de-identified] : Patient seen and examined today for follow up. Reports feeling well overall.   She remains on Examestane (stopped Verzino in August)-tolerating well. Reports  fatigue improved but does have joint pain Lymphedema to left arm remains, using pump at home  [facility]-5/[facility]24 [facility]- does not follow DEXA-2022 Mammo-11/2022

## 2024-09-25 NOTE — BEGINNING OF VISIT
[0] : 1) Little interest or pleasure doing things: Not at all (0) [2] : 2) Feeling down, depressed, or hopeless for more than half of the days (2) [Pain Scale: ___] : On a scale of 1-10, today the patient's pain is a(n) [unfilled]. [Former] : Former [20 or more] : 20 or more [> 15 Years] : > 15 Years [Date Discussed (MM/DD/YY): ___] : Discussed: [unfilled] [With Patient/Caregiver] : with Patient/Caregiver [[id number]] : 2 [Future Reassessment of Pain Scale] : Future reassessment of pain scale [Medication(s)] : Medication(s)

## 2024-09-25 NOTE — REVIEW OF SYSTEMS
[Fatigue] : fatigue [Joint Stiffness] : joint stiffness [Insomnia] : insomnia [Depression] : depression [Hot Flashes] : hot flashes [Negative] : Allergic/Immunologic [Chills] : chills [Fever] : no fever [Night Sweats] : no night sweats [Shortness Of Breath] : no shortness of breath [Wheezing] : no wheezing [Cough] : no cough [SOB on Exertion] : no shortness of breath during exertion [Abdominal Pain] : no abdominal pain [Vomiting] : no vomiting [Constipation] : no constipation [Joint Pain] : no joint pain [Muscle Pain] : no muscle pain [Muscle Weakness] : no muscle weakness [Difficulty Walking] : no difficulty walking [Anxiety] : no anxiety [de-identified] : +L arm lymphedema improved with compression stocking  [de-identified] : occasional

## 2024-09-25 NOTE — HISTORY OF PRESENT ILLNESS
[de-identified] : This is a 67-year-old postmenopausal woman presenting with a new diagnosis of breast cancer\par  Patient goes for regular routine screening mammograms and was initally  found to have architectural distortion at the right breast 12 o'clock position, ultrasound revealing for a 5 mm density she underwent a right breast  biopsy at [facility]  on 3/2019.  which showed atypical ductal hyperplasia and then underwent wide excision in March 2019 showing a radial sclerosing lesion but no further atypia or malignancy. Dr juarez. \par  ? chemo prevention ? \par   Patient follows with Dr. Jessica Hi due to the high risk status and goes for regular mammo and MRI \par  \par  Routine screening mammogram on October 26, 2021 showed postsurgical changes right breast but there was a new ill-defined spiculated mass left breast 7 o'clock position measuring 2 cm there is also an enlarged left axillary lymph node measuring 4.4 x 1.3 cm ( this appeared benign  was not seen on  MRI) \par  \par  patient underwent a left breast ultrasound-guided core biopsy on November 5, 2021  which revealed an intermediate grade invasive  ductal carcinoma ER/[place] strongly +95 to 100% HER-2/merrill negative Ki-67 between 40 and 50%.\par  \par  MRI performed on November 29, 2021 revealing for a biopsy-proven malignancy in her left breast up to 2.1 cm with suspicious linear nonmasslike enhancement extending to the anterior inferior index mass total suspicious area of enhancement is 3.3 cm also linear non-mass enhancement left breast at the central portion of the breast MRI guided biopsy was recommended as well suspicious nonletter masslike enhancement in the inner right breast measuring 4.7 cm MRI guided biopsy was recommended.\par  \par  MRI guided biopsy performed on December 9, 2021 revealing for the following left breast non-mass-like enhancement showed usual ductal hyperplasia\par  \par  Right breast non-mass-like enhancement lower inner quadrant revealed grade 2 invasive lobular carcinoma grade 2 out of 3 invasive carcinoma was present in multiple cores up to 12 mm LCIS was also present and focal atypical ductal hyperplasia\par  Tumor was ER +80% WA +40% HER-2/merrill negative Ki-67 25%.\par  \par  Await genetic testing , monday.  9 genes\par  Daughter is negative for BRCA \par  \par  Patient has extensive family history of cancer including a mother who had colon cancer a paternal grandmother who had breast cancer maternal cousin who had ovarian cancer and then breast cancer.  2 paternal cousins who had breast cancer her sister had a phyllodes \par  \par  Status post bilateral partial mastectomies with sentinel lymph node biopsy on January 21, 2022 with Dr. Jessica Hi.\par  \par  Right side-final pathological staging PT2N0(i) \par  -Largest tumor 5 cm classic invasive lobular smaller tumor measuring 4 mm\par  -Grade 2, no lymphovascular invasion\par  -Margin less than 1 mm inferior\par  -1 out of 2 lymph nodes revealing for isolated tumor cells\par  -ER WA positive both at 99% HER-2 negative Ki-67 8%\par  \par  Left side - final path T2N1a\par  Largest tumor 2.7 cm with satellite lesions measuring 1 mm and 2 mm invasive ductal carcinoma\par  -Grade 2 no lymphovascular invasion\par  2 lymph nodes positive for macro metastases measuring 2.4 cm with extranodal extension\par  1 lymph node positive for micro met for a total of 3 out of 4 lymph nodes positive\par  ER/WA +95% and 99%, HER-2 negative Ki-67 25%\par  Margins were also close as well.\par  \par  Oncotype DX score on the left was low risk at 17 equating to a 15% risk of distant recurrence.\par  Oncotype DX score on the right was low risk at 10 with a 3% risk of distant recurrence over the next 10 years.\par  \par  CT of the chest abdomen pelvis show hypoattenuation lesion in the spleen measuring 1.5 cm but no evidence of metastatic disease\par  Bone scan negative for any evidence of metastatic disease.\par  \par  \par  \par  \par  \par  \par  \par   [de-identified] : Patient seen and examined today for follow up. Reports feeling well overall.   She remains on Examestane (stopped Verzino in August)-tolerating well. Reports  fatigue improved but does have joint pain Lymphedema to left arm remains, using pump at home  [facility]-5/[facility]- does not follow DEXA-2022 Mammo-11/2022

## 2024-09-25 NOTE — HISTORY OF PRESENT ILLNESS
[de-identified] : This is a 67-year-old postmenopausal woman presenting with a new diagnosis of breast cancer\par  Patient goes for regular routine screening mammograms and was initally  found to have architectural distortion at the right breast 12 o'clock position, ultrasound revealing for a 5 mm density she underwent a right breast  biopsy at [facility]  on 3/2019.  which showed atypical ductal hyperplasia and then underwent wide excision in March 2019 showing a radial sclerosing lesion but no further atypia or malignancy. Dr juarez. \par  ? chemo prevention ? \par   Patient follows with Dr. Jessica Hi due to the high risk status and goes for regular mammo and MRI \par  \par  Routine screening mammogram on October 26, 2021 showed postsurgical changes right breast but there was a new ill-defined spiculated mass left breast 7 o'clock position measuring 2 cm there is also an enlarged left axillary lymph node measuring 4.4 x 1.3 cm ( this appeared benign  was not seen on  MRI) \par  \par  patient underwent a left breast ultrasound-guided core biopsy on November 5, 2021  which revealed an intermediate grade invasive  ductal carcinoma ER/[place] strongly +95 to 100% HER-2/merrill negative Ki-67 between 40 and 50%.\par  \par  MRI performed on November 29, 2021 revealing for a biopsy-proven malignancy in her left breast up to 2.1 cm with suspicious linear nonmasslike enhancement extending to the anterior inferior index mass total suspicious area of enhancement is 3.3 cm also linear non-mass enhancement left breast at the central portion of the breast MRI guided biopsy was recommended as well suspicious nonletter masslike enhancement in the inner right breast measuring 4.7 cm MRI guided biopsy was recommended.\par  \par  MRI guided biopsy performed on December 9, 2021 revealing for the following left breast non-mass-like enhancement showed usual ductal hyperplasia\par  \par  Right breast non-mass-like enhancement lower inner quadrant revealed grade 2 invasive lobular carcinoma grade 2 out of 3 invasive carcinoma was present in multiple cores up to 12 mm LCIS was also present and focal atypical ductal hyperplasia\par  Tumor was ER +80% MA +40% HER-2/merrill negative Ki-67 25%.\par  \par  Await genetic testing , monday.  9 genes\par  Daughter is negative for BRCA \par  \par  Patient has extensive family history of cancer including a mother who had colon cancer a paternal grandmother who had breast cancer maternal cousin who had ovarian cancer and then breast cancer.  2 paternal cousins who had breast cancer her sister had a phyllodes \par  \par  Status post bilateral partial mastectomies with sentinel lymph node biopsy on January 21, 2022 with Dr. Jessica Hi.\par  \par  Right side-final pathological staging PT2N0(i) \par  -Largest tumor 5 cm classic invasive lobular smaller tumor measuring 4 mm\par  -Grade 2, no lymphovascular invasion\par  -Margin less than 1 mm inferior\par  -1 out of 2 lymph nodes revealing for isolated tumor cells\par  -ER MA positive both at 99% HER-2 negative Ki-67 8%\par  \par  Left side - final path T2N1a\par  Largest tumor 2.7 cm with satellite lesions measuring 1 mm and 2 mm invasive ductal carcinoma\par  -Grade 2 no lymphovascular invasion\par  2 lymph nodes positive for macro metastases measuring 2.4 cm with extranodal extension\par  1 lymph node positive for micro met for a total of 3 out of 4 lymph nodes positive\par  ER/MA +95% and 99%, HER-2 negative Ki-67 25%\par  Margins were also close as well.\par  \par  Oncotype DX score on the left was low risk at 17 equating to a 15% risk of distant recurrence.\par  Oncotype DX score on the right was low risk at 10 with a 3% risk of distant recurrence over the next 10 years.\par  \par  CT of the chest abdomen pelvis show hypoattenuation lesion in the spleen measuring 1.5 cm but no evidence of metastatic disease\par  Bone scan negative for any evidence of metastatic disease.\par  \par  \par  \par  \par  \par  \par  \par   [de-identified] : Patient seen and examined today for follow up. Reports feeling well overall.   She remains on Examestane (stopped Verzino in August)-tolerating well. Reports  fatigue improved but does have joint pain Lymphedema to left arm remains, using pump at home  [facility]-5/[facility]- does not follow DEXA-2022 Mammo-11/2022

## 2024-09-25 NOTE — PHYSICAL EXAM
[Fully active, able to carry on all pre-disease performance without restriction] : Status 0 - Fully active, able to carry on all pre-disease performance without restriction [Normal] : affect appropriate [de-identified] : b/l breast s/p partial mastectomy and expanders in place. no palpable masses nor lesions. left axilla with cording.   [de-identified] : moderate distention . normoactive bowel sounds. no palpable tenderness . no guarding upon percussion [de-identified] : LUE and LLE lymphedema. wears compression sleeve to LUE

## 2024-09-25 NOTE — HISTORY OF PRESENT ILLNESS
[de-identified] : This is a 67-year-old postmenopausal woman presenting with a new diagnosis of breast cancer\par  Patient goes for regular routine screening mammograms and was initally  found to have architectural distortion at the right breast 12 o'clock position, ultrasound revealing for a 5 mm density she underwent a right breast  biopsy at [facility]  on 3/2019.  which showed atypical ductal hyperplasia and then underwent wide excision in March 2019 showing a radial sclerosing lesion but no further atypia or malignancy. Dr juarez. \par  ? chemo prevention ? \par   Patient follows with Dr. Jessica Hi due to the high risk status and goes for regular mammo and MRI \par  \par  Routine screening mammogram on October 26, 2021 showed postsurgical changes right breast but there was a new ill-defined spiculated mass left breast 7 o'clock position measuring 2 cm there is also an enlarged left axillary lymph node measuring 4.4 x 1.3 cm ( this appeared benign  was not seen on  MRI) \par  \par  patient underwent a left breast ultrasound-guided core biopsy on November 5, 2021  which revealed an intermediate grade invasive  ductal carcinoma ER/MS strongly +95 to 100% HER-2/merrill negative Ki-67 between 40 and 50%.\par  \par  MRI performed on November 29, 2021 revealing for a biopsy-proven malignancy in her left breast up to 2.1 cm with suspicious linear nonmasslike enhancement extending to the anterior inferior index mass total suspicious area of enhancement is 3.3 cm also linear non-mass enhancement left breast at the central portion of the breast MRI guided biopsy was recommended as well suspicious nonletter masslike enhancement in the inner right breast measuring 4.7 cm MRI guided biopsy was recommended.\par  \par  MRI guided biopsy performed on December 9, 2021 revealing for the following left breast non-mass-like enhancement showed usual ductal hyperplasia\par  \par  Right breast non-mass-like enhancement lower inner quadrant revealed grade 2 invasive lobular carcinoma grade 2 out of 3 invasive carcinoma was present in multiple cores up to 12 mm LCIS was also present and focal atypical ductal hyperplasia\par  Tumor was ER +80% MS +40% HER-2/merrill negative Ki-67 25%.\par  \par  Await genetic testing , monday.  9 genes\par  Daughter is negative for BRCA \par  \par  Patient has extensive family history of cancer including a mother who had colon cancer a paternal grandmother who had breast cancer maternal cousin who had ovarian cancer and then breast cancer.  2 paternal cousins who had breast cancer her sister had a phyllodes \par  \par  Status post bilateral partial mastectomies with sentinel lymph node biopsy on January 21, 2022 with Dr. Jessica Hi.\par  \par  Right side-final pathological staging PT2N0(i) \par  -Largest tumor 5 cm classic invasive lobular smaller tumor measuring 4 mm\par  -Grade 2, no lymphovascular invasion\par  -Margin less than 1 mm inferior\par  -1 out of 2 lymph nodes revealing for isolated tumor cells\par  -ER MS positive both at 99% HER-2 negative Ki-67 8%\par  \par  Left side - final path T2N1a\par  Largest tumor 2.7 cm with satellite lesions measuring 1 mm and 2 mm invasive ductal carcinoma\par  -Grade 2 no lymphovascular invasion\par  2 lymph nodes positive for macro metastases measuring 2.4 cm with extranodal extension\par  1 lymph node positive for micro met for a total of 3 out of 4 lymph nodes positive\par  ER/MS +95% and 99%, HER-2 negative Ki-67 25%\par  Margins were also close as well.\par  \par  Oncotype DX score on the left was low risk at 17 equating to a 15% risk of distant recurrence.\par  Oncotype DX score on the right was low risk at 10 with a 3% risk of distant recurrence over the next 10 years.\par  \par  CT of the chest abdomen pelvis show hypoattenuation lesion in the spleen measuring 1.5 cm but no evidence of metastatic disease\par  Bone scan negative for any evidence of metastatic disease.\par  \par  \par  \par  \par  \par  \par  \par   [de-identified] : Patient seen and examined today for follow up. Reports feeling well overall.   She remains on Examestane (stopped Verzino in August)-tolerating well. Reports  fatigue improved but does have joint pain Lymphedema to left arm remains, using pump at home  [facility]-5/2024 GYN- does not follow DEXA-2022 Mammo-11/2022

## 2024-09-25 NOTE — PHYSICAL EXAM
[Fully active, able to carry on all pre-disease performance without restriction] : Status 0 - Fully active, able to carry on all pre-disease performance without restriction [Normal] : affect appropriate [de-identified] : b/l breast s/p partial mastectomy and expanders in place. no palpable masses nor lesions. left axilla with cording.   [de-identified] : moderate distention . normoactive bowel sounds. no palpable tenderness . no guarding upon percussion [de-identified] : LUE and LLE lymphedema. wears compression sleeve to LUE

## 2024-09-25 NOTE — REVIEW OF SYSTEMS
[Fatigue] : fatigue [Joint Stiffness] : joint stiffness [Insomnia] : insomnia [Depression] : depression [Hot Flashes] : hot flashes [Negative] : Allergic/Immunologic [Chills] : chills [Fever] : no fever [Night Sweats] : no night sweats [Shortness Of Breath] : no shortness of breath [Wheezing] : no wheezing [Cough] : no cough [SOB on Exertion] : no shortness of breath during exertion [Abdominal Pain] : no abdominal pain [Vomiting] : no vomiting [Constipation] : no constipation [Joint Pain] : no joint pain [Muscle Pain] : no muscle pain [Muscle Weakness] : no muscle weakness [Difficulty Walking] : no difficulty walking [Anxiety] : no anxiety [de-identified] : +L arm lymphedema improved with compression stocking  [de-identified] : occasional

## 2024-09-25 NOTE — BEGINNING OF VISIT
[0] : 1) Little interest or pleasure doing things: Not at all (0) [2] : 2) Feeling down, depressed, or hopeless for more than half of the days (2) [Pain Scale: ___] : On a scale of 1-10, today the patient's pain is a(n) [unfilled]. [Former] : Former [20 or more] : 20 or more [> 15 Years] : > 15 Years [Date Discussed (MM/DD/YY): ___] : Discussed: [unfilled] [With Patient/Caregiver] : with Patient/Caregiver [[id number]] : 2 [Future Reassessment of Pain Scale] : Future reassessment of pain scale [Medication(s)] : Medication(s)

## 2024-09-25 NOTE — ASSESSMENT
[FreeTextEntry1] : This is a 69-year-old postmenopausal woman presenting with bilateral breast cancer. Routine screening mammogram revealing for new abnormality on the left biopsy revealing for invasive ductal carcinoma ER/NJ strongly positive HER-2/merrill negative. Initially there was an enlarged lymph node but this was thought to be negative due to appearance and the lack of visualization on MRI. Then on MRI in November 2021 patient was found to have a contralateral breast mass on the right measuring 4.7 cm biopsy was revealing for invasive lobular carcinoma grade 2 ER/NJ positive HER-2/merrill negative. She is now planned for bilateral lumpectomies.  #bilateral breast cancer - the right is in T2N 0 (i) with a 5 cm invasive lobular carcinoma and 2 lymph nodes positive for isolated tumor cells. ER/NJ strongly positive at 99% respectively HER-2 negative Ki-67 8%, grade 2 , no LVI -The left side is positive T2N1a for a 2.7 cm invasive ductal carcinoma grade 2 no lymphovascular invasion 2 macrometastases to the lymph node and 1 micrometastases for a grand total of 3 out of 4 this tumor is also ER/NJ positive strongly at 95 and 99% respectively but the Ki-67 is higher at 25% -Oncotype DX score was reviewed today at length with the patient and her daughter on the left the score is low at 17 which equates to a 15% risk of distant recurrence in the next 10 years on the right the scores also low risk at 10 with a 3% risk of distant recurrence over the next 10 years. -Because both Oncotype's are low risk this would indicate that based on the molecular biology there would be no significant benefit to giving chemotherapy to reduce the risk of recurrence. However in looking at the clinical risks of both cancers the left with the high Ki-67 and the 3+ lymph nodes clinically the patient is high risk. -Reviewed with patient that both MammaPrint sent on both the right and left cancers results also came back low risk a consistent with a lack of chemotherapy benefit. -Explained the difference between molecular phenotype low risk score and how these both can be incorporated. -Reviewed the final pathology from the bilateral mastectomy which shows residual cancer on the right and on the left there are no additional positive lymph nodes. Therefore the final staging as was determined by the lumpectomy specimens is unchanged. -Again reviewed that in order for the molecular studies to be valid especially the Oncotype have to consider the Rxsponder trial and the enrollment criteria which included 3 or less positive lymph nodes. -Because the additional lymph node dissection did not show any additional positive lymph nodes the patient falls within this criteria. However I did explain that given the 2 primaries and especially given that the right side was over 5 cm she would considered a higher risk side clinically especially given that the left sided breast cancer was lymph node positive. Could consider giving chemotherapy in this situation however given that both Oncotype and MammaPrint would argue that there is no benefit to chemotherapy based on the molecular phenotype this must be taken into consideration. Prior to molecular studies it would have been standard to give chemotherapy however now we know based on the molecular phenotype that chemotherapy may not be beneficial in this setting and that hormone therapy may be more beneficial. In addition now we have the option of giving a CK4 6 inhibitor in the adjuvant setting for high risk patients that I believe this patient would fall into given the high Ki-67 and the large size and multiple positive lymph nodes. -we mutually decided to forgo chemotherapy and proceed with endocrine therapy -We discussed hormonal therapy at length including the mechanism efficacy and side effects. We decided to go with letrozole, discussed the side effects of joint pains hot flashes and bone thinning. -In addition, we discussed Verzenio, and the [employer] data. We discussed getting some efficacy and side effects including the risk of cytopenias, GI side effects, fibrosis. - CT and Bone Scan negative for mets - She is s/p completion of radiation - Started Letrozole 5/2022- tolerated fairly well in the beginning. Now with persistent debilitating sxs, joint pain, weight gain, hot flashes. Recommended to hold for 1 week to see if symptoms resolve. -Verzenio started 7/2022. She is s/p ER visit for SOB, rash, congestion, and leg swelling. Records reviewed. Likely r/t to verzenio. 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 S/p 40mg prednisone with taper 08/26/22 and s/p completion of steroids on 9/30/22. Repeat CT chest 10/05/22 shows stable pneumonitis. Will consider lowering dose due to prior toxicity 100mg verzenio B.I.D. - Pt will start 100mg b.i.d verzenio on 11/5/22 - Prior symptoms improved 1 week off of Letrozole. Will switch to Exemestane, started 11/2/22. - s/p L breast US to r/o seroma, revealing no evidence of malignancy, no fluid collection. pt continues to use - left axilla cording- improvement with PT at [place]. - LLE - edema, Oct 2022 doppler negative for DVT. recommend cont compression sleeve and PT. pt also ordered lymphedema pump for LUE. - had follow up with ( Dr. Patricio) on Jan 9, 2023- plan for possible breast revision April 2023- May 2023? - Remain on exemestane and verzenio ( 100mg b.i.d). tolerating well. - continue - Ca 15-3 elevated, but stable. continue to monitor - Sx scheduled for 8/24/23- swap of the expanders and left axilla dissection. Stop Verzenio a week before sx, Eliquis 48h before surgery - Verzenio resumed- hold 12/6/23 due to recent diverticulitis now possible breast inf- seen by breast surgeon this am on Cipro, stop Verzenio   - resumed - decrease the dose and finish the treatment July 2024.   - continue letrozole   # Chest wall pain - plan to remove implant, hold Apixaban x 48h before procedure  #R gonadal/adnexa vein thrombosis - s/p CT CAP 1/30/23 incidental finding - started on Eliquis, now 5mg BID - Had eval with Dr Ly April 2023- TV ultrasound and pap smear completed. - reviewed with patient - side effect of Verzenio. - patient to stay on AC while on Verzenio - CT pelvis May 2023. - repeat CT scan - if gonadal vein thrombosis dissipated, after verzenio stopped lower eliquis to 2.5 mg PO BID  # Arthritis - increase physical activity  #LUE lymphedema -Fall 2022, s/p RT - Continue compression sleeve - PT at [place] now completed - ordered compression pump  # osteopenia - DEXA 8/2022  #anxiety - Improving - Xanax as needed - Emotional support given  #splenic lesion - Incidental finding CT c/a/p - few lesions largest measuring 1.5cm. - Repeat CTA 08/2022 show stable in appearance- 1.2cm   Stable since 2020 s/p repeat CT CAP 1/30/23 stable  #insomnia - Improved  #LLE swelling - 11/1/22- LLE duplex- no evidence of DVT - Lymphedema. - CT abd/pelvis scheduled 01/30/23 R gonadal/adnexa v thrombosis - Consider PT  # Patient pinpoints pain in the scalp area, also feels pulling on the face - suspected neuralgia - neuro evaluation  RTC in 12 w

## 2024-09-25 NOTE — BEGINNING OF VISIT
[0] : 1) Little interest or pleasure doing things: Not at all (0) [2] : 2) Feeling down, depressed, or hopeless for more than half of the days (2) [Pain Scale: ___] : On a scale of 1-10, today the patient's pain is a(n) [unfilled]. [Former] : Former [20 or more] : 20 or more [> 15 Years] : > 15 Years [Date Discussed (MM/DD/YY): ___] : Discussed: [unfilled] [With Patient/Caregiver] : with Patient/Caregiver [[id number]] : 2 [Future Reassessment of Pain Scale] : Future reassessment of pain scale [Medication(s)] : Medication(s)

## 2024-09-25 NOTE — ASSESSMENT
[FreeTextEntry1] : This is a 69-year-old postmenopausal woman presenting with bilateral breast cancer. Routine screening mammogram revealing for new abnormality on the left biopsy revealing for invasive ductal carcinoma ER/NM strongly positive HER-2/merrill negative. Initially there was an enlarged lymph node but this was thought to be negative due to appearance and the lack of visualization on MRI. Then on MRI in November 2021 patient was found to have a contralateral breast mass on the right measuring 4.7 cm biopsy was revealing for invasive lobular carcinoma grade 2 ER/NM positive HER-2/merrill negative. She is now planned for bilateral lumpectomies.  #bilateral breast cancer - the right is in T2N 0 (i) with a 5 cm invasive lobular carcinoma and 2 lymph nodes positive for isolated tumor cells. ER/NM strongly positive at 99% respectively HER-2 negative Ki-67 8%, grade 2 , no LVI -The left side is positive T2N1a for a 2.7 cm invasive ductal carcinoma grade 2 no lymphovascular invasion 2 macrometastases to the lymph node and 1 micrometastases for a grand total of 3 out of 4 this tumor is also ER/NM positive strongly at 95 and 99% respectively but the Ki-67 is higher at 25% -Oncotype DX score was reviewed today at length with the patient and her daughter on the left the score is low at 17 which equates to a 15% risk of distant recurrence in the next 10 years on the right the scores also low risk at 10 with a 3% risk of distant recurrence over the next 10 years. -Because both Oncotype's are low risk this would indicate that based on the molecular biology there would be no significant benefit to giving chemotherapy to reduce the risk of recurrence. However in looking at the clinical risks of both cancers the left with the high Ki-67 and the 3+ lymph nodes clinically the patient is high risk. -Reviewed with patient that both MammaPrint sent on both the right and left cancers results also came back low risk a consistent with a lack of chemotherapy benefit. -Explained the difference between molecular phenotype low risk score and how these both can be incorporated. -Reviewed the final pathology from the bilateral mastectomy which shows residual cancer on the right and on the left there are no additional positive lymph nodes. Therefore the final staging as was determined by the lumpectomy specimens is unchanged. -Again reviewed that in order for the molecular studies to be valid especially the Oncotype have to consider the Rxsponder trial and the enrollment criteria which included 3 or less positive lymph nodes. -Because the additional lymph node dissection did not show any additional positive lymph nodes the patient falls within this criteria. However I did explain that given the 2 primaries and especially given that the right side was over 5 cm she would considered a higher risk side clinically especially given that the left sided breast cancer was lymph node positive. Could consider giving chemotherapy in this situation however given that both Oncotype and MammaPrint would argue that there is no benefit to chemotherapy based on the molecular phenotype this must be taken into consideration. Prior to molecular studies it would have been standard to give chemotherapy however now we know based on the molecular phenotype that chemotherapy may not be beneficial in this setting and that hormone therapy may be more beneficial. In addition now we have the option of giving a CK4 6 inhibitor in the adjuvant setting for high risk patients that I believe this patient would fall into given the high Ki-67 and the large size and multiple positive lymph nodes. -we mutually decided to forgo chemotherapy and proceed with endocrine therapy -We discussed hormonal therapy at length including the mechanism efficacy and side effects. We decided to go with letrozole, discussed the side effects of joint pains hot flashes and bone thinning. -In addition, we discussed Verzenio, and the [employer] data. We discussed getting some efficacy and side effects including the risk of cytopenias, GI side effects, fibrosis. - CT and Bone Scan negative for mets - She is s/p completion of radiation - Started Letrozole 5/2022- tolerated fairly well in the beginning. Now with persistent debilitating sxs, joint pain, weight gain, hot flashes. Recommended to hold for 1 week to see if symptoms resolve. -Verzenio started 7/2022. She is s/p ER visit for SOB, rash, congestion, and leg swelling. Records reviewed. Likely r/t to verzenio. 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 S/p 40mg prednisone with taper 08/26/22 and s/p completion of steroids on 9/30/22. Repeat CT chest 10/05/22 shows stable pneumonitis. Will consider lowering dose due to prior toxicity 100mg verzenio B.I.D. - Pt will start 100mg b.i.d verzenio on 11/5/22 - Prior symptoms improved 1 week off of Letrozole. Will switch to Exemestane, started 11/2/22. - s/p L breast US to r/o seroma, revealing no evidence of malignancy, no fluid collection. pt continues to use - left axilla cording- improvement with PT at [place]. - LLE - edema, Oct 2022 doppler negative for DVT. recommend cont compression sleeve and PT. pt also ordered lymphedema pump for LUE. - had follow up with ( Dr. Patricio) on Jan 9, 2023- plan for possible breast revision April 2023- May 2023? - Remain on exemestane and verzenio ( 100mg b.i.d). tolerating well. - continue - Ca 15-3 elevated, but stable. continue to monitor - Sx scheduled for 8/24/23- swap of the expanders and left axilla dissection. Stop Verzenio a week before sx, Eliquis 48h before surgery - Verzenio resumed- hold 12/6/23 due to recent diverticulitis now possible breast inf- seen by breast surgeon this am on Cipro, stop Verzenio   - resumed - decrease the dose and finish the treatment July 2024.   - continue letrozole   # Chest wall pain - plan to remove implant, hold Apixaban x 48h before procedure  #R gonadal/adnexa vein thrombosis - s/p CT CAP 1/30/23 incidental finding - started on Eliquis, now 5mg BID - Had eval with Dr Ly April 2023- TV ultrasound and pap smear completed. - reviewed with patient - side effect of Verzenio. - patient to stay on AC while on Verzenio - CT pelvis May 2023. - repeat CT scan - if gonadal vein thrombosis dissipated, after verzenio stopped lower eliquis to 2.5 mg PO BID  # Arthritis - increase physical activity  #LUE lymphedema -Fall 2022, s/p RT - Continue compression sleeve - PT at [place] now completed - ordered compression pump  # osteopenia - DEXA 8/2022  #anxiety - Improving - Xanax as needed - Emotional support given  #splenic lesion - Incidental finding CT c/a/p - few lesions largest measuring 1.5cm. - Repeat CTA 08/2022 show stable in appearance- 1.2cm   Stable since 2020 s/p repeat CT CAP 1/30/23 stable  #insomnia - Improved  #LLE swelling - 11/1/22- LLE duplex- no evidence of DVT - Lymphedema. - CT abd/pelvis scheduled 01/30/23 R gonadal/adnexa v thrombosis - Consider PT  # Patient pinpoints pain in the scalp area, also feels pulling on the face - suspected neuralgia - neuro evaluation  RTC in 12 w

## 2024-09-25 NOTE — PHYSICAL EXAM
[Fully active, able to carry on all pre-disease performance without restriction] : Status 0 - Fully active, able to carry on all pre-disease performance without restriction [Normal] : affect appropriate [de-identified] : b/l breast s/p partial mastectomy and expanders in place. no palpable masses nor lesions. left axilla with cording.   [de-identified] : moderate distention . normoactive bowel sounds. no palpable tenderness . no guarding upon percussion [de-identified] : LUE and LLE lymphedema. wears compression sleeve to LUE

## 2024-09-25 NOTE — ASSESSMENT
[FreeTextEntry1] : This is a 69-year-old postmenopausal woman presenting with bilateral breast cancer. Routine screening mammogram revealing for new abnormality on the left biopsy revealing for invasive ductal carcinoma ER/OK strongly positive HER-2/merrill negative. Initially there was an enlarged lymph node but this was thought to be negative due to appearance and the lack of visualization on MRI. Then on MRI in November 2021 patient was found to have a contralateral breast mass on the right measuring 4.7 cm biopsy was revealing for invasive lobular carcinoma grade 2 ER/OK positive HER-2/merrill negative. She is now planned for bilateral lumpectomies.  #bilateral breast cancer - the right is in T2N 0 (i) with a 5 cm invasive lobular carcinoma and 2 lymph nodes positive for isolated tumor cells. ER/OK strongly positive at 99% respectively HER-2 negative Ki-67 8%, grade 2 , no LVI -The left side is positive T2N1a for a 2.7 cm invasive ductal carcinoma grade 2 no lymphovascular invasion 2 macrometastases to the lymph node and 1 micrometastases for a grand total of 3 out of 4 this tumor is also ER/OK positive strongly at 95 and 99% respectively but the Ki-67 is higher at 25% -Oncotype DX score was reviewed today at length with the patient and her daughter on the left the score is low at 17 which equates to a 15% risk of distant recurrence in the next 10 years on the right the scores also low risk at 10 with a 3% risk of distant recurrence over the next 10 years. -Because both Oncotype's are low risk this would indicate that based on the molecular biology there would be no significant benefit to giving chemotherapy to reduce the risk of recurrence. However in looking at the clinical risks of both cancers the left with the high Ki-67 and the 3+ lymph nodes clinically the patient is high risk. -Reviewed with patient that both MammaPrint sent on both the right and left cancers results also came back low risk a consistent with a lack of chemotherapy benefit. -Explained the difference between molecular phenotype low risk score and how these both can be incorporated. -Reviewed the final pathology from the bilateral mastectomy which shows residual cancer on the right and on the left there are no additional positive lymph nodes. Therefore the final staging as was determined by the lumpectomy specimens is unchanged. -Again reviewed that in order for the molecular studies to be valid especially the Oncotype have to consider the Rxsponder trial and the enrollment criteria which included 3 or less positive lymph nodes. -Because the additional lymph node dissection did not show any additional positive lymph nodes the patient falls within this criteria. However I did explain that given the 2 primaries and especially given that the right side was over 5 cm she would considered a higher risk side clinically especially given that the left sided breast cancer was lymph node positive. Could consider giving chemotherapy in this situation however given that both Oncotype and MammaPrint would argue that there is no benefit to chemotherapy based on the molecular phenotype this must be taken into consideration. Prior to molecular studies it would have been standard to give chemotherapy however now we know based on the molecular phenotype that chemotherapy may not be beneficial in this setting and that hormone therapy may be more beneficial. In addition now we have the option of giving a CK4 6 inhibitor in the adjuvant setting for high risk patients that I believe this patient would fall into given the high Ki-67 and the large size and multiple positive lymph nodes. -we mutually decided to forgo chemotherapy and proceed with endocrine therapy -We discussed hormonal therapy at length including the mechanism efficacy and side effects. We decided to go with letrozole, discussed the side effects of joint pains hot flashes and bone thinning. -In addition, we discussed Verzenio, and the [employer] data. We discussed getting some efficacy and side effects including the risk of cytopenias, GI side effects, fibrosis. - CT and Bone Scan negative for mets - She is s/p completion of radiation - Started Letrozole 5/2022- tolerated fairly well in the beginning. Now with persistent debilitating sxs, joint pain, weight gain, hot flashes. Recommended to hold for 1 week to see if symptoms resolve. -Verzenio started 7/2022. She is s/p ER visit for SOB, rash, congestion, and leg swelling. Records reviewed. Likely r/t to verzenio. 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 S/p 40mg prednisone with taper 08/26/22 and s/p completion of steroids on 9/30/22. Repeat CT chest 10/05/22 shows stable pneumonitis. Will consider lowering dose due to prior toxicity 100mg verzenio B.I.D. - Pt will start 100mg b.i.d verzenio on 11/5/22 - Prior symptoms improved 1 week off of Letrozole. Will switch to Exemestane, started 11/2/22. - s/p L breast US to r/o seroma, revealing no evidence of malignancy, no fluid collection. pt continues to use - left axilla cording- improvement with PT at [place]. - LLE - edema, Oct 2022 doppler negative for DVT. recommend cont compression sleeve and PT. pt also ordered lymphedema pump for LUE. - had follow up with ( Dr. Patricio) on Jan 9, 2023- plan for possible breast revision April 2023- May 2023? - Remain on exemestane and verzenio ( 100mg b.i.d). tolerating well. - continue - Ca 15-3 elevated, but stable. continue to monitor - Sx scheduled for 8/24/23- swap of the expanders and left axilla dissection. Stop Verzenio a week before sx, Eliquis 48h before surgery - Verzenio resumed- hold 12/6/23 due to recent diverticulitis now possible breast inf- seen by breast surgeon this am on Cipro, stop Verzenio   - resumed - decrease the dose and finish the treatment July 2024.   - continue letrozole   # Chest wall pain - plan to remove implant, hold Apixaban x 48h before procedure  #R gonadal/adnexa vein thrombosis - s/p CT CAP 1/30/23 incidental finding - started on Eliquis, now 5mg BID - Had eval with Dr Ly April 2023- TV ultrasound and pap smear completed. - reviewed with patient - side effect of Verzenio. - patient to stay on AC while on Verzenio - CT pelvis May 2023. - repeat CT scan - if gonadal vein thrombosis dissipated, after verzenio stopped lower eliquis to 2.5 mg PO BID  # Arthritis - increase physical activity  #LUE lymphedema -Fall 2022, s/p RT - Continue compression sleeve - PT at [place] now completed - ordered compression pump  # osteopenia - DEXA 8/2022  #anxiety - Improving - Xanax as needed - Emotional support given  #splenic lesion - Incidental finding CT c/a/p - few lesions largest measuring 1.5cm. - Repeat CTA 08/2022 show stable in appearance- 1.2cm   Stable since 2020 s/p repeat CT CAP 1/30/23 stable  #insomnia - Improved  #LLE swelling - 11/1/22- LLE duplex- no evidence of DVT - Lymphedema. - CT abd/pelvis scheduled 01/30/23 R gonadal/adnexa v thrombosis - Consider PT  # Patient pinpoints pain in the scalp area, also feels pulling on the face - suspected neuralgia - neuro evaluation  RTC in 12 w

## 2024-10-15 ENCOUNTER — TRANSCRIPTION ENCOUNTER (OUTPATIENT)
Age: 69
End: 2024-10-15

## 2024-10-29 ENCOUNTER — TRANSCRIPTION ENCOUNTER (OUTPATIENT)
Age: 69
End: 2024-10-29

## 2024-12-31 ENCOUNTER — NON-APPOINTMENT (OUTPATIENT)
Age: 69
End: 2024-12-31

## 2025-01-07 ENCOUNTER — LABORATORY RESULT (OUTPATIENT)
Age: 70
End: 2025-01-07

## 2025-01-08 ENCOUNTER — RESULT REVIEW (OUTPATIENT)
Age: 70
End: 2025-01-08

## 2025-01-08 ENCOUNTER — APPOINTMENT (OUTPATIENT)
Dept: HEMATOLOGY ONCOLOGY | Facility: CLINIC | Age: 70
End: 2025-01-08
Payer: MEDICARE

## 2025-01-08 VITALS
HEIGHT: 63 IN | RESPIRATION RATE: 16 BRPM | OXYGEN SATURATION: 98 % | WEIGHT: 219.19 LBS | SYSTOLIC BLOOD PRESSURE: 154 MMHG | HEART RATE: 67 BPM | DIASTOLIC BLOOD PRESSURE: 87 MMHG | BODY MASS INDEX: 38.84 KG/M2 | TEMPERATURE: 97.1 F

## 2025-01-08 DIAGNOSIS — M85.80 OTHER SPECIFIED DISORDERS OF BONE DENSITY AND STRUCTURE, UNSPECIFIED SITE: ICD-10-CM

## 2025-01-08 DIAGNOSIS — Z17.0 MALIGNANT NEOPLASM OF LOWER-INNER QUADRANT OF LEFT FEMALE BREAST: ICD-10-CM

## 2025-01-08 DIAGNOSIS — I82.409 ACUTE EMBOLISM AND THROMBOSIS OF UNSPECIFIED DEEP VEINS OF UNSPECIFIED LOWER EXTREMITY: ICD-10-CM

## 2025-01-08 DIAGNOSIS — C50.312 MALIGNANT NEOPLASM OF LOWER-INNER QUADRANT OF LEFT FEMALE BREAST: ICD-10-CM

## 2025-01-08 DIAGNOSIS — C50.311 MALIGNANT NEOPLASM OF LOWER-INNER QUADRANT OF RIGHT FEMALE BREAST: ICD-10-CM

## 2025-01-08 DIAGNOSIS — Z17.0 MALIGNANT NEOPLASM OF LOWER-INNER QUADRANT OF RIGHT FEMALE BREAST: ICD-10-CM

## 2025-01-08 PROCEDURE — 99214 OFFICE O/P EST MOD 30 MIN: CPT

## 2025-01-09 LAB
CANCER AG15-3 SERPL-ACNC: 31.1 U/ML
CEA SERPL-MCNC: 1.9 NG/ML

## 2025-01-16 ENCOUNTER — APPOINTMENT (OUTPATIENT)
Dept: BREAST CENTER | Facility: CLINIC | Age: 70
End: 2025-01-16
Payer: MEDICARE

## 2025-01-16 VITALS
HEART RATE: 66 BPM | DIASTOLIC BLOOD PRESSURE: 72 MMHG | HEIGHT: 63 IN | OXYGEN SATURATION: 97 % | SYSTOLIC BLOOD PRESSURE: 116 MMHG | BODY MASS INDEX: 35.44 KG/M2 | WEIGHT: 200 LBS

## 2025-01-16 DIAGNOSIS — Z80.3 FAMILY HISTORY OF MALIGNANT NEOPLASM OF BREAST: ICD-10-CM

## 2025-01-16 DIAGNOSIS — Z85.3 PERSONAL HISTORY OF MALIGNANT NEOPLASM OF BREAST: ICD-10-CM

## 2025-01-16 DIAGNOSIS — Z12.39 ENCOUNTER FOR OTHER SCREENING FOR MALIGNANT NEOPLASM OF BREAST: ICD-10-CM

## 2025-01-16 DIAGNOSIS — Z90.13 ACQUIRED ABSENCE OF BILATERAL BREASTS AND NIPPLES: ICD-10-CM

## 2025-01-16 DIAGNOSIS — I89.0 LYMPHEDEMA, NOT ELSEWHERE CLASSIFIED: ICD-10-CM

## 2025-01-16 PROCEDURE — 99213 OFFICE O/P EST LOW 20 MIN: CPT

## 2025-03-28 ENCOUNTER — TRANSCRIPTION ENCOUNTER (OUTPATIENT)
Age: 70
End: 2025-03-28

## 2025-04-14 ENCOUNTER — APPOINTMENT (OUTPATIENT)
Dept: HEMATOLOGY ONCOLOGY | Facility: CLINIC | Age: 70
End: 2025-04-14

## 2025-04-21 ENCOUNTER — NON-APPOINTMENT (OUTPATIENT)
Age: 70
End: 2025-04-21

## 2025-04-23 ENCOUNTER — APPOINTMENT (OUTPATIENT)
Dept: HEMATOLOGY ONCOLOGY | Facility: CLINIC | Age: 70
End: 2025-04-23
Payer: MEDICARE

## 2025-04-23 ENCOUNTER — RESULT REVIEW (OUTPATIENT)
Age: 70
End: 2025-04-23

## 2025-04-23 VITALS
TEMPERATURE: 97.5 F | OXYGEN SATURATION: 98 % | HEART RATE: 70 BPM | WEIGHT: 221 LBS | HEIGHT: 63 IN | RESPIRATION RATE: 16 BRPM | SYSTOLIC BLOOD PRESSURE: 119 MMHG | DIASTOLIC BLOOD PRESSURE: 79 MMHG | BODY MASS INDEX: 39.16 KG/M2

## 2025-04-23 DIAGNOSIS — Z17.0 MALIGNANT NEOPLASM OF LOWER-INNER QUADRANT OF RIGHT FEMALE BREAST: ICD-10-CM

## 2025-04-23 DIAGNOSIS — C50.312 MALIGNANT NEOPLASM OF LOWER-INNER QUADRANT OF LEFT FEMALE BREAST: ICD-10-CM

## 2025-04-23 DIAGNOSIS — Z17.0 MALIGNANT NEOPLASM OF LOWER-INNER QUADRANT OF LEFT FEMALE BREAST: ICD-10-CM

## 2025-04-23 DIAGNOSIS — I89.0 LYMPHEDEMA, NOT ELSEWHERE CLASSIFIED: ICD-10-CM

## 2025-04-23 DIAGNOSIS — R53.83 OTHER FATIGUE: ICD-10-CM

## 2025-04-23 DIAGNOSIS — I82.409 ACUTE EMBOLISM AND THROMBOSIS OF UNSPECIFIED DEEP VEINS OF UNSPECIFIED LOWER EXTREMITY: ICD-10-CM

## 2025-04-23 DIAGNOSIS — C50.311 MALIGNANT NEOPLASM OF LOWER-INNER QUADRANT OF RIGHT FEMALE BREAST: ICD-10-CM

## 2025-04-23 PROCEDURE — 36415 COLL VENOUS BLD VENIPUNCTURE: CPT

## 2025-04-23 PROCEDURE — 99213 OFFICE O/P EST LOW 20 MIN: CPT

## 2025-06-03 ENCOUNTER — NON-APPOINTMENT (OUTPATIENT)
Age: 70
End: 2025-06-03

## 2025-06-16 ENCOUNTER — APPOINTMENT (OUTPATIENT)
Dept: BREAST CENTER | Facility: CLINIC | Age: 70
End: 2025-06-16
Payer: MEDICARE

## 2025-06-16 VITALS
BODY MASS INDEX: 32.96 KG/M2 | DIASTOLIC BLOOD PRESSURE: 83 MMHG | OXYGEN SATURATION: 98 % | SYSTOLIC BLOOD PRESSURE: 137 MMHG | WEIGHT: 210 LBS | HEIGHT: 67 IN | HEART RATE: 65 BPM

## 2025-06-16 VITALS — HEART RATE: 65 BPM | WEIGHT: 210 LBS | OXYGEN SATURATION: 96 % | HEIGHT: 67 IN | BODY MASS INDEX: 32.96 KG/M2

## 2025-06-16 PROCEDURE — 99213 OFFICE O/P EST LOW 20 MIN: CPT

## 2025-07-03 ENCOUNTER — APPOINTMENT (OUTPATIENT)
Dept: BREAST CENTER | Facility: CLINIC | Age: 70
End: 2025-07-03
Payer: MEDICARE

## 2025-07-03 VITALS
HEART RATE: 88 BPM | WEIGHT: 210 LBS | SYSTOLIC BLOOD PRESSURE: 140 MMHG | HEIGHT: 67 IN | OXYGEN SATURATION: 97 % | DIASTOLIC BLOOD PRESSURE: 76 MMHG | BODY MASS INDEX: 32.96 KG/M2

## 2025-07-03 PROCEDURE — 99213 OFFICE O/P EST LOW 20 MIN: CPT

## 2025-07-17 ENCOUNTER — APPOINTMENT (OUTPATIENT)
Dept: BREAST CENTER | Facility: CLINIC | Age: 70
End: 2025-07-17

## 2025-07-23 ENCOUNTER — APPOINTMENT (OUTPATIENT)
Dept: HEMATOLOGY ONCOLOGY | Facility: CLINIC | Age: 70
End: 2025-07-23
Payer: MEDICARE

## 2025-07-23 ENCOUNTER — APPOINTMENT (OUTPATIENT)
Dept: BREAST CENTER | Facility: CLINIC | Age: 70
End: 2025-07-23

## 2025-07-23 ENCOUNTER — RESULT REVIEW (OUTPATIENT)
Age: 70
End: 2025-07-23

## 2025-07-23 VITALS
RESPIRATION RATE: 17 BRPM | HEART RATE: 66 BPM | OXYGEN SATURATION: 97 % | HEIGHT: 67 IN | TEMPERATURE: 96.9 F | BODY MASS INDEX: 35.16 KG/M2 | WEIGHT: 224 LBS | SYSTOLIC BLOOD PRESSURE: 144 MMHG | DIASTOLIC BLOOD PRESSURE: 88 MMHG

## 2025-07-23 DIAGNOSIS — C50.312 MALIGNANT NEOPLASM OF LOWER-INNER QUADRANT OF LEFT FEMALE BREAST: ICD-10-CM

## 2025-07-23 DIAGNOSIS — Z17.0 MALIGNANT NEOPLASM OF LOWER-INNER QUADRANT OF RIGHT FEMALE BREAST: ICD-10-CM

## 2025-07-23 DIAGNOSIS — Z17.0 MALIGNANT NEOPLASM OF LOWER-INNER QUADRANT OF LEFT FEMALE BREAST: ICD-10-CM

## 2025-07-23 DIAGNOSIS — M85.80 OTHER SPECIFIED DISORDERS OF BONE DENSITY AND STRUCTURE, UNSPECIFIED SITE: ICD-10-CM

## 2025-07-23 DIAGNOSIS — I82.409 ACUTE EMBOLISM AND THROMBOSIS OF UNSPECIFIED DEEP VEINS OF UNSPECIFIED LOWER EXTREMITY: ICD-10-CM

## 2025-07-23 DIAGNOSIS — C50.311 MALIGNANT NEOPLASM OF LOWER-INNER QUADRANT OF RIGHT FEMALE BREAST: ICD-10-CM

## 2025-07-23 PROCEDURE — 36415 COLL VENOUS BLD VENIPUNCTURE: CPT

## 2025-07-23 PROCEDURE — 99213 OFFICE O/P EST LOW 20 MIN: CPT

## 2025-08-01 ENCOUNTER — APPOINTMENT (OUTPATIENT)
Dept: BREAST CENTER | Facility: CLINIC | Age: 70
End: 2025-08-01
Payer: MEDICARE

## 2025-08-01 VITALS
DIASTOLIC BLOOD PRESSURE: 84 MMHG | WEIGHT: 230 LBS | BODY MASS INDEX: 36.1 KG/M2 | SYSTOLIC BLOOD PRESSURE: 155 MMHG | OXYGEN SATURATION: 100 % | HEART RATE: 58 BPM | HEIGHT: 67 IN

## 2025-08-01 DIAGNOSIS — Z90.13 ACQUIRED ABSENCE OF BILATERAL BREASTS AND NIPPLES: ICD-10-CM

## 2025-08-01 DIAGNOSIS — Z85.3 PERSONAL HISTORY OF MALIGNANT NEOPLASM OF BREAST: ICD-10-CM

## 2025-08-01 DIAGNOSIS — N60.91 UNSPECIFIED BENIGN MAMMARY DYSPLASIA OF RIGHT BREAST: ICD-10-CM

## 2025-08-01 DIAGNOSIS — I89.0 LYMPHEDEMA, NOT ELSEWHERE CLASSIFIED: ICD-10-CM

## 2025-08-01 DIAGNOSIS — Z80.3 FAMILY HISTORY OF MALIGNANT NEOPLASM OF BREAST: ICD-10-CM

## 2025-08-01 DIAGNOSIS — Z12.39 ENCOUNTER FOR OTHER SCREENING FOR MALIGNANT NEOPLASM OF BREAST: ICD-10-CM

## 2025-08-01 PROCEDURE — 99213 OFFICE O/P EST LOW 20 MIN: CPT

## 2025-08-25 ENCOUNTER — APPOINTMENT (OUTPATIENT)
Dept: BREAST CENTER | Facility: CLINIC | Age: 70
End: 2025-08-25
Payer: MEDICARE

## 2025-08-25 ENCOUNTER — NON-APPOINTMENT (OUTPATIENT)
Age: 70
End: 2025-08-25

## 2025-08-25 VITALS
HEART RATE: 62 BPM | DIASTOLIC BLOOD PRESSURE: 82 MMHG | SYSTOLIC BLOOD PRESSURE: 138 MMHG | WEIGHT: 220 LBS | BODY MASS INDEX: 34.46 KG/M2 | OXYGEN SATURATION: 98 %

## 2025-08-25 DIAGNOSIS — Z80.3 FAMILY HISTORY OF MALIGNANT NEOPLASM OF BREAST: ICD-10-CM

## 2025-08-25 DIAGNOSIS — Z90.13 ACQUIRED ABSENCE OF BILATERAL BREASTS AND NIPPLES: ICD-10-CM

## 2025-08-25 DIAGNOSIS — I89.0 LYMPHEDEMA, NOT ELSEWHERE CLASSIFIED: ICD-10-CM

## 2025-08-25 DIAGNOSIS — Z85.3 PERSONAL HISTORY OF MALIGNANT NEOPLASM OF BREAST: ICD-10-CM

## 2025-08-25 DIAGNOSIS — Z12.39 ENCOUNTER FOR OTHER SCREENING FOR MALIGNANT NEOPLASM OF BREAST: ICD-10-CM

## 2025-08-25 PROCEDURE — 99213 OFFICE O/P EST LOW 20 MIN: CPT

## 2025-09-18 ENCOUNTER — APPOINTMENT (OUTPATIENT)
Dept: BREAST CENTER | Facility: CLINIC | Age: 70
End: 2025-09-18
Payer: MEDICARE

## 2025-09-18 VITALS
DIASTOLIC BLOOD PRESSURE: 85 MMHG | WEIGHT: 220 LBS | HEART RATE: 71 BPM | SYSTOLIC BLOOD PRESSURE: 155 MMHG | BODY MASS INDEX: 34.46 KG/M2

## 2025-09-18 DIAGNOSIS — Z85.3 PERSONAL HISTORY OF MALIGNANT NEOPLASM OF BREAST: ICD-10-CM

## 2025-09-18 DIAGNOSIS — Z90.13 ACQUIRED ABSENCE OF BILATERAL BREASTS AND NIPPLES: ICD-10-CM

## 2025-09-18 DIAGNOSIS — Z12.39 ENCOUNTER FOR OTHER SCREENING FOR MALIGNANT NEOPLASM OF BREAST: ICD-10-CM

## 2025-09-18 DIAGNOSIS — I89.0 LYMPHEDEMA, NOT ELSEWHERE CLASSIFIED: ICD-10-CM

## 2025-09-18 DIAGNOSIS — Z80.3 FAMILY HISTORY OF MALIGNANT NEOPLASM OF BREAST: ICD-10-CM

## 2025-09-18 PROCEDURE — 99213 OFFICE O/P EST LOW 20 MIN: CPT
